# Patient Record
Sex: MALE
[De-identification: names, ages, dates, MRNs, and addresses within clinical notes are randomized per-mention and may not be internally consistent; named-entity substitution may affect disease eponyms.]

---

## 2017-07-06 PROBLEM — Z00.00 ENCOUNTER FOR PREVENTIVE HEALTH EXAMINATION: Status: ACTIVE | Noted: 2017-07-06

## 2017-07-07 ENCOUNTER — APPOINTMENT (OUTPATIENT)
Dept: INTERNAL MEDICINE | Facility: CLINIC | Age: 47
End: 2017-07-07

## 2017-07-31 ENCOUNTER — NON-APPOINTMENT (OUTPATIENT)
Age: 47
End: 2017-07-31

## 2017-07-31 ENCOUNTER — APPOINTMENT (OUTPATIENT)
Dept: CARDIOLOGY | Facility: CLINIC | Age: 47
End: 2017-07-31
Payer: MEDICAID

## 2017-07-31 VITALS
BODY MASS INDEX: 46.59 KG/M2 | DIASTOLIC BLOOD PRESSURE: 101 MMHG | WEIGHT: 283 LBS | HEIGHT: 65.5 IN | SYSTOLIC BLOOD PRESSURE: 167 MMHG | OXYGEN SATURATION: 99 % | HEART RATE: 70 BPM

## 2017-07-31 VITALS — DIASTOLIC BLOOD PRESSURE: 101 MMHG | HEART RATE: 67 BPM | OXYGEN SATURATION: 98 % | SYSTOLIC BLOOD PRESSURE: 167 MMHG

## 2017-07-31 DIAGNOSIS — Z83.3 FAMILY HISTORY OF DIABETES MELLITUS: ICD-10-CM

## 2017-07-31 PROCEDURE — 93000 ELECTROCARDIOGRAM COMPLETE: CPT

## 2017-07-31 PROCEDURE — 99204 OFFICE O/P NEW MOD 45 MIN: CPT | Mod: 25

## 2017-08-17 ENCOUNTER — APPOINTMENT (OUTPATIENT)
Dept: CARDIOLOGY | Facility: CLINIC | Age: 47
End: 2017-08-17
Payer: MEDICAID

## 2017-08-17 PROCEDURE — 93306 TTE W/DOPPLER COMPLETE: CPT

## 2017-09-01 ENCOUNTER — APPOINTMENT (OUTPATIENT)
Dept: BARIATRICS | Facility: CLINIC | Age: 47
End: 2017-09-01
Payer: MEDICAID

## 2017-09-01 VITALS
SYSTOLIC BLOOD PRESSURE: 160 MMHG | DIASTOLIC BLOOD PRESSURE: 90 MMHG | HEIGHT: 63.5 IN | BODY MASS INDEX: 49.69 KG/M2 | WEIGHT: 283.95 LBS

## 2017-09-01 DIAGNOSIS — Z83.49 FAMILY HISTORY OF OTHER ENDOCRINE, NUTRITIONAL AND METABOLIC DISEASES: ICD-10-CM

## 2017-09-01 DIAGNOSIS — Z86.69 PERSONAL HISTORY OF OTHER DISEASES OF THE NERVOUS SYSTEM AND SENSE ORGANS: ICD-10-CM

## 2017-09-01 DIAGNOSIS — Z82.49 FAMILY HISTORY OF ISCHEMIC HEART DISEASE AND OTHER DISEASES OF THE CIRCULATORY SYSTEM: ICD-10-CM

## 2017-09-01 PROCEDURE — 99205 OFFICE O/P NEW HI 60 MIN: CPT

## 2017-09-08 ENCOUNTER — RESULT CHARGE (OUTPATIENT)
Age: 47
End: 2017-09-08

## 2017-09-08 ENCOUNTER — MEDICATION RENEWAL (OUTPATIENT)
Age: 47
End: 2017-09-08

## 2017-09-08 ENCOUNTER — APPOINTMENT (OUTPATIENT)
Dept: CARDIOLOGY | Facility: CLINIC | Age: 47
End: 2017-09-08

## 2017-09-08 ENCOUNTER — FORM ENCOUNTER (OUTPATIENT)
Age: 47
End: 2017-09-08

## 2017-09-08 RX ORDER — VALSARTAN 160 MG/1
160 TABLET, COATED ORAL
Qty: 90 | Refills: 2 | Status: DISCONTINUED | COMMUNITY
Start: 2017-09-08 | End: 2017-09-08

## 2017-09-08 RX ORDER — RAMIPRIL 2.5 MG/1
2.5 CAPSULE ORAL
Qty: 30 | Refills: 0 | Status: DISCONTINUED | COMMUNITY
Start: 2017-09-08 | End: 2017-09-08

## 2017-09-09 ENCOUNTER — APPOINTMENT (OUTPATIENT)
Dept: ULTRASOUND IMAGING | Facility: CLINIC | Age: 47
End: 2017-09-09

## 2017-09-09 ENCOUNTER — TRANSCRIPTION ENCOUNTER (OUTPATIENT)
Age: 47
End: 2017-09-09

## 2017-09-09 ENCOUNTER — OUTPATIENT (OUTPATIENT)
Dept: OUTPATIENT SERVICES | Facility: HOSPITAL | Age: 47
LOS: 1 days | End: 2017-09-09
Payer: COMMERCIAL

## 2017-09-09 DIAGNOSIS — Z00.8 ENCOUNTER FOR OTHER GENERAL EXAMINATION: ICD-10-CM

## 2017-09-09 PROCEDURE — 93970 EXTREMITY STUDY: CPT

## 2017-09-09 PROCEDURE — 76700 US EXAM ABDOM COMPLETE: CPT | Mod: 26

## 2017-09-09 PROCEDURE — 76700 US EXAM ABDOM COMPLETE: CPT

## 2017-09-09 PROCEDURE — 93970 EXTREMITY STUDY: CPT | Mod: 26

## 2017-09-11 ENCOUNTER — APPOINTMENT (OUTPATIENT)
Dept: CARDIOLOGY | Facility: CLINIC | Age: 47
End: 2017-09-11
Payer: MEDICAID

## 2017-09-11 LAB
ALBUMIN SERPL ELPH-MCNC: 4.1 G/DL
ALP BLD-CCNC: 80 U/L
ALT SERPL-CCNC: 25 U/L
ANION GAP SERPL CALC-SCNC: 15 MMOL/L
AST SERPL-CCNC: 21 U/L
BILIRUB SERPL-MCNC: 0.5 MG/DL
BUN SERPL-MCNC: 15 MG/DL
CALCIUM SERPL-MCNC: 9.6 MG/DL
CHLORIDE SERPL-SCNC: 101 MMOL/L
CO2 SERPL-SCNC: 25 MMOL/L
CREAT SERPL-MCNC: 1.01 MG/DL
GLUCOSE SERPL-MCNC: 79 MG/DL
POTASSIUM SERPL-SCNC: 5 MMOL/L
PROT SERPL-MCNC: 7.2 G/DL
SODIUM SERPL-SCNC: 141 MMOL/L

## 2017-09-11 PROCEDURE — 78452 HT MUSCLE IMAGE SPECT MULT: CPT

## 2017-09-11 PROCEDURE — A9500: CPT

## 2017-09-11 PROCEDURE — 93015 CV STRESS TEST SUPVJ I&R: CPT

## 2017-09-13 ENCOUNTER — APPOINTMENT (OUTPATIENT)
Dept: CARDIOLOGY | Facility: CLINIC | Age: 47
End: 2017-09-13
Payer: MEDICAID

## 2017-09-13 VITALS
RESPIRATION RATE: 16 BRPM | WEIGHT: 282 LBS | OXYGEN SATURATION: 97 % | HEIGHT: 63 IN | HEART RATE: 74 BPM | DIASTOLIC BLOOD PRESSURE: 98 MMHG | TEMPERATURE: 98.9 F | SYSTOLIC BLOOD PRESSURE: 160 MMHG | BODY MASS INDEX: 49.96 KG/M2

## 2017-09-13 PROCEDURE — 99215 OFFICE O/P EST HI 40 MIN: CPT

## 2017-09-13 RX ORDER — SITAGLIPTIN 100 MG/1
100 TABLET, FILM COATED ORAL
Qty: 90 | Refills: 1 | Status: DISCONTINUED | COMMUNITY
End: 2017-09-13

## 2017-09-13 RX ORDER — RAMIPRIL 2.5 MG/1
2.5 CAPSULE ORAL DAILY
Qty: 30 | Refills: 0 | Status: DISCONTINUED | COMMUNITY
End: 2017-09-13

## 2017-09-13 RX ORDER — LOSARTAN POTASSIUM 50 MG/1
50 TABLET, FILM COATED ORAL
Qty: 30 | Refills: 3 | Status: DISCONTINUED | COMMUNITY
Start: 2017-09-08 | End: 2017-09-13

## 2017-09-25 ENCOUNTER — APPOINTMENT (OUTPATIENT)
Dept: CARDIOLOGY | Facility: CLINIC | Age: 47
End: 2017-09-25
Payer: MEDICAID

## 2017-09-25 VITALS
OXYGEN SATURATION: 99 % | WEIGHT: 273 LBS | HEIGHT: 63 IN | SYSTOLIC BLOOD PRESSURE: 129 MMHG | BODY MASS INDEX: 48.37 KG/M2 | DIASTOLIC BLOOD PRESSURE: 85 MMHG | HEART RATE: 68 BPM

## 2017-09-25 PROCEDURE — 99215 OFFICE O/P EST HI 40 MIN: CPT

## 2017-10-10 ENCOUNTER — APPOINTMENT (OUTPATIENT)
Dept: PULMONOLOGY | Facility: CLINIC | Age: 47
End: 2017-10-10
Payer: MEDICAID

## 2017-10-10 VITALS
HEIGHT: 65 IN | DIASTOLIC BLOOD PRESSURE: 84 MMHG | OXYGEN SATURATION: 98 % | WEIGHT: 273 LBS | HEART RATE: 70 BPM | BODY MASS INDEX: 45.48 KG/M2 | SYSTOLIC BLOOD PRESSURE: 130 MMHG | RESPIRATION RATE: 17 BRPM

## 2017-10-10 PROCEDURE — 99205 OFFICE O/P NEW HI 60 MIN: CPT | Mod: 25

## 2017-10-10 PROCEDURE — 71020: CPT

## 2017-10-10 PROCEDURE — 94010 BREATHING CAPACITY TEST: CPT | Mod: 59

## 2017-10-10 PROCEDURE — 94620 PULMONARY STRESS TESTING SIMPLE: CPT

## 2017-10-12 ENCOUNTER — MEDICATION RENEWAL (OUTPATIENT)
Age: 47
End: 2017-10-12

## 2017-10-31 ENCOUNTER — APPOINTMENT (OUTPATIENT)
Dept: ORTHOPEDIC SURGERY | Facility: CLINIC | Age: 47
End: 2017-10-31
Payer: MEDICAID

## 2017-10-31 VITALS
HEART RATE: 68 BPM | SYSTOLIC BLOOD PRESSURE: 138 MMHG | HEIGHT: 65 IN | WEIGHT: 273 LBS | BODY MASS INDEX: 45.48 KG/M2 | DIASTOLIC BLOOD PRESSURE: 81 MMHG

## 2017-10-31 PROCEDURE — 73562 X-RAY EXAM OF KNEE 3: CPT | Mod: RT

## 2017-10-31 PROCEDURE — 99213 OFFICE O/P EST LOW 20 MIN: CPT

## 2017-11-01 ENCOUNTER — MEDICATION RENEWAL (OUTPATIENT)
Age: 47
End: 2017-11-01

## 2017-11-01 RX ORDER — OLOPATADINE HYDROCHLORIDE 665 UG/1
0.6 SPRAY, METERED NASAL
Qty: 3 | Refills: 1 | Status: DISCONTINUED | COMMUNITY
Start: 2017-10-10 | End: 2017-11-01

## 2017-11-02 ENCOUNTER — APPOINTMENT (OUTPATIENT)
Dept: BARIATRICS/WEIGHT MGMT | Facility: CLINIC | Age: 47
End: 2017-11-02
Payer: COMMERCIAL

## 2017-11-02 VITALS — WEIGHT: 270.4 LBS | BODY MASS INDEX: 45 KG/M2

## 2017-11-02 DIAGNOSIS — Z78.9 OTHER SPECIFIED HEALTH STATUS: ICD-10-CM

## 2017-11-02 DIAGNOSIS — F17.290 NICOTINE DEPENDENCE, OTHER TOBACCO PRODUCT, UNCOMPLICATED: ICD-10-CM

## 2017-11-02 PROCEDURE — 90791 PSYCH DIAGNOSTIC EVALUATION: CPT

## 2017-11-03 ENCOUNTER — APPOINTMENT (OUTPATIENT)
Dept: GASTROENTEROLOGY | Facility: CLINIC | Age: 47
End: 2017-11-03
Payer: MEDICAID

## 2017-11-03 VITALS
BODY MASS INDEX: 44.65 KG/M2 | TEMPERATURE: 98.2 F | OXYGEN SATURATION: 97 % | HEIGHT: 65 IN | WEIGHT: 268 LBS | HEART RATE: 83 BPM | SYSTOLIC BLOOD PRESSURE: 140 MMHG | DIASTOLIC BLOOD PRESSURE: 90 MMHG | RESPIRATION RATE: 14 BRPM

## 2017-11-03 PROCEDURE — 99204 OFFICE O/P NEW MOD 45 MIN: CPT

## 2017-11-03 RX ORDER — BETAMETHASONE DIPROPIONATE 0.5 MG/G
0.05 OINTMENT, AUGMENTED TOPICAL
Qty: 50 | Refills: 0 | Status: DISCONTINUED | COMMUNITY
Start: 2017-07-24 | End: 2017-11-03

## 2017-11-07 ENCOUNTER — APPOINTMENT (OUTPATIENT)
Dept: BARIATRICS/WEIGHT MGMT | Facility: CLINIC | Age: 47
End: 2017-11-07
Payer: SELF-PAY

## 2017-11-07 VITALS — WEIGHT: 270 LBS | BODY MASS INDEX: 44.98 KG/M2 | HEIGHT: 65 IN

## 2017-11-07 PROCEDURE — 97802 MEDICAL NUTRITION INDIV IN: CPT

## 2017-11-17 ENCOUNTER — APPOINTMENT (OUTPATIENT)
Dept: ORTHOPEDIC SURGERY | Facility: CLINIC | Age: 47
End: 2017-11-17
Payer: MEDICAID

## 2017-11-17 VITALS
DIASTOLIC BLOOD PRESSURE: 82 MMHG | HEART RATE: 67 BPM | BODY MASS INDEX: 44.98 KG/M2 | SYSTOLIC BLOOD PRESSURE: 139 MMHG | HEIGHT: 65 IN | WEIGHT: 270 LBS

## 2017-11-17 PROCEDURE — 20610 DRAIN/INJ JOINT/BURSA W/O US: CPT | Mod: RT

## 2017-11-17 PROCEDURE — 99214 OFFICE O/P EST MOD 30 MIN: CPT | Mod: 25

## 2017-11-17 RX ORDER — HYALURONATE SOD, CROSS-LINKED 30 MG/3 ML
30 SYRINGE (ML) INTRAARTICULAR
Qty: 1 | Refills: 0 | Status: DISCONTINUED | OUTPATIENT
Start: 2017-10-31 | End: 2017-11-03

## 2017-11-21 ENCOUNTER — APPOINTMENT (OUTPATIENT)
Dept: ORTHOPEDIC SURGERY | Facility: CLINIC | Age: 47
End: 2017-11-21
Payer: MEDICAID

## 2017-11-21 VITALS
DIASTOLIC BLOOD PRESSURE: 89 MMHG | HEART RATE: 73 BPM | SYSTOLIC BLOOD PRESSURE: 150 MMHG | BODY MASS INDEX: 44.98 KG/M2 | HEIGHT: 65 IN | WEIGHT: 270 LBS

## 2017-11-21 PROCEDURE — 20610 DRAIN/INJ JOINT/BURSA W/O US: CPT | Mod: RT

## 2017-11-21 RX ADMIN — Medication 0 MG/3ML: at 00:00

## 2017-11-28 ENCOUNTER — OUTPATIENT (OUTPATIENT)
Dept: OUTPATIENT SERVICES | Facility: HOSPITAL | Age: 47
LOS: 1 days | End: 2017-11-28
Payer: MEDICAID

## 2017-11-28 ENCOUNTER — TRANSCRIPTION ENCOUNTER (OUTPATIENT)
Age: 47
End: 2017-11-28

## 2017-11-28 ENCOUNTER — RESULT REVIEW (OUTPATIENT)
Age: 47
End: 2017-11-28

## 2017-11-28 ENCOUNTER — APPOINTMENT (OUTPATIENT)
Dept: GASTROENTEROLOGY | Facility: HOSPITAL | Age: 47
End: 2017-11-28

## 2017-11-28 DIAGNOSIS — Z01.818 ENCOUNTER FOR OTHER PREPROCEDURAL EXAMINATION: ICD-10-CM

## 2017-11-28 PROCEDURE — 88313 SPECIAL STAINS GROUP 2: CPT | Mod: 26

## 2017-11-28 PROCEDURE — 43239 EGD BIOPSY SINGLE/MULTIPLE: CPT

## 2017-11-28 PROCEDURE — 88305 TISSUE EXAM BY PATHOLOGIST: CPT

## 2017-11-28 PROCEDURE — 88312 SPECIAL STAINS GROUP 1: CPT | Mod: 26

## 2017-11-28 PROCEDURE — 88313 SPECIAL STAINS GROUP 2: CPT

## 2017-11-28 PROCEDURE — 88312 SPECIAL STAINS GROUP 1: CPT

## 2017-11-28 PROCEDURE — 88305 TISSUE EXAM BY PATHOLOGIST: CPT | Mod: 26

## 2017-11-30 ENCOUNTER — APPOINTMENT (OUTPATIENT)
Dept: PULMONOLOGY | Facility: CLINIC | Age: 47
End: 2017-11-30
Payer: MEDICAID

## 2017-11-30 VITALS
OXYGEN SATURATION: 97 % | WEIGHT: 269 LBS | RESPIRATION RATE: 17 BRPM | HEIGHT: 65 IN | HEART RATE: 61 BPM | BODY MASS INDEX: 44.82 KG/M2 | DIASTOLIC BLOOD PRESSURE: 80 MMHG | SYSTOLIC BLOOD PRESSURE: 128 MMHG

## 2017-11-30 PROCEDURE — 94010 BREATHING CAPACITY TEST: CPT

## 2017-11-30 PROCEDURE — 99214 OFFICE O/P EST MOD 30 MIN: CPT | Mod: 25

## 2017-11-30 RX ORDER — HYALURONATE SOD, CROSS-LINKED 30 MG/3 ML
30 SYRINGE (ML) INTRAARTICULAR
Refills: 0 | Status: COMPLETED | OUTPATIENT
Start: 2017-11-21

## 2017-12-01 ENCOUNTER — OUTPATIENT (OUTPATIENT)
Dept: OUTPATIENT SERVICES | Facility: HOSPITAL | Age: 47
LOS: 1 days | End: 2017-12-01
Payer: MEDICAID

## 2017-12-01 ENCOUNTER — APPOINTMENT (OUTPATIENT)
Dept: BARIATRICS/WEIGHT MGMT | Facility: CLINIC | Age: 47
End: 2017-12-01
Payer: SELF-PAY

## 2017-12-01 ENCOUNTER — APPOINTMENT (OUTPATIENT)
Dept: BARIATRICS | Facility: CLINIC | Age: 47
End: 2017-12-01
Payer: MEDICAID

## 2017-12-01 VITALS — HEIGHT: 65 IN | WEIGHT: 270 LBS | BODY MASS INDEX: 44.98 KG/M2

## 2017-12-01 VITALS
SYSTOLIC BLOOD PRESSURE: 134 MMHG | HEART RATE: 68 BPM | WEIGHT: 270 LBS | HEIGHT: 63.5 IN | DIASTOLIC BLOOD PRESSURE: 92 MMHG | OXYGEN SATURATION: 98 % | BODY MASS INDEX: 47.25 KG/M2

## 2017-12-01 LAB
24R-OH-CALCIDIOL SERPL-MCNC: 19.9 NG/ML — LOW (ref 30–80)
ALBUMIN SERPL ELPH-MCNC: 3.7 G/DL — SIGNIFICANT CHANGE UP (ref 3.3–5)
ALP SERPL-CCNC: 87 U/L — SIGNIFICANT CHANGE UP (ref 30–120)
ALT FLD-CCNC: 33 U/L DA — SIGNIFICANT CHANGE UP (ref 10–60)
ANION GAP SERPL CALC-SCNC: 10 MMOL/L — SIGNIFICANT CHANGE UP (ref 5–17)
APTT BLD: 31.6 SEC — SIGNIFICANT CHANGE UP (ref 27.5–37.4)
AST SERPL-CCNC: 17 U/L — SIGNIFICANT CHANGE UP (ref 10–40)
BASOPHILS # BLD AUTO: 0.1 K/UL — SIGNIFICANT CHANGE UP (ref 0–0.2)
BASOPHILS NFR BLD AUTO: 0.8 % — SIGNIFICANT CHANGE UP (ref 0–2)
BILIRUB SERPL-MCNC: 0.5 MG/DL — SIGNIFICANT CHANGE UP (ref 0.2–1.2)
BUN SERPL-MCNC: 19 MG/DL — SIGNIFICANT CHANGE UP (ref 7–23)
CALCIUM SERPL-MCNC: 9.3 MG/DL — SIGNIFICANT CHANGE UP (ref 8.4–10.5)
CHLORIDE SERPL-SCNC: 102 MMOL/L — SIGNIFICANT CHANGE UP (ref 96–108)
CHOLEST SERPL-MCNC: 133 MG/DL — SIGNIFICANT CHANGE UP (ref 10–199)
CO2 SERPL-SCNC: 28 MMOL/L — SIGNIFICANT CHANGE UP (ref 22–31)
CREAT SERPL-MCNC: 1.06 MG/DL — SIGNIFICANT CHANGE UP (ref 0.5–1.3)
EOSINOPHIL # BLD AUTO: 0.3 K/UL — SIGNIFICANT CHANGE UP (ref 0–0.5)
EOSINOPHIL NFR BLD AUTO: 3.5 % — SIGNIFICANT CHANGE UP (ref 0–6)
FOLATE SERPL-MCNC: 15.4 NG/ML — SIGNIFICANT CHANGE UP (ref 4.8–24.2)
GLUCOSE SERPL-MCNC: 141 MG/DL — HIGH (ref 70–99)
HBA1C BLD-MCNC: 6.9 % — HIGH (ref 4–5.6)
HCT VFR BLD CALC: 42.9 % — SIGNIFICANT CHANGE UP (ref 39–50)
HDLC SERPL-MCNC: 37 MG/DL — LOW (ref 40–125)
HGB BLD-MCNC: 13.7 G/DL — SIGNIFICANT CHANGE UP (ref 13–17)
INR BLD: 0.98 RATIO — SIGNIFICANT CHANGE UP (ref 0.88–1.16)
IRON SATN MFR SERPL: 26 % — SIGNIFICANT CHANGE UP (ref 16–55)
IRON SATN MFR SERPL: 72 UG/DL — SIGNIFICANT CHANGE UP (ref 45–165)
LIPID PNL WITH DIRECT LDL SERPL: 70 MG/DL — SIGNIFICANT CHANGE UP
LYMPHOCYTES # BLD AUTO: 2.1 K/UL — SIGNIFICANT CHANGE UP (ref 1–3.3)
LYMPHOCYTES # BLD AUTO: 21.5 % — SIGNIFICANT CHANGE UP (ref 13–44)
MCHC RBC-ENTMCNC: 25.6 PG — LOW (ref 27–34)
MCHC RBC-ENTMCNC: 31.9 GM/DL — LOW (ref 32–36)
MCV RBC AUTO: 80.3 FL — SIGNIFICANT CHANGE UP (ref 80–100)
MONOCYTES # BLD AUTO: 0.5 K/UL — SIGNIFICANT CHANGE UP (ref 0–0.9)
MONOCYTES NFR BLD AUTO: 5.2 % — SIGNIFICANT CHANGE UP (ref 2–14)
NEUTROPHILS # BLD AUTO: 6.8 K/UL — SIGNIFICANT CHANGE UP (ref 1.8–7.4)
NEUTROPHILS NFR BLD AUTO: 69 % — SIGNIFICANT CHANGE UP (ref 43–77)
PLATELET # BLD AUTO: 238 K/UL — SIGNIFICANT CHANGE UP (ref 150–400)
POTASSIUM SERPL-MCNC: 4.1 MMOL/L — SIGNIFICANT CHANGE UP (ref 3.5–5.3)
POTASSIUM SERPL-SCNC: 4.1 MMOL/L — SIGNIFICANT CHANGE UP (ref 3.5–5.3)
PROT SERPL-MCNC: 7.7 G/DL — SIGNIFICANT CHANGE UP (ref 6–8.3)
PROTHROM AB SERPL-ACNC: 10.7 SEC — SIGNIFICANT CHANGE UP (ref 9.8–12.7)
RBC # BLD: 5.35 M/UL — SIGNIFICANT CHANGE UP (ref 4.2–5.8)
RBC # FLD: 13.2 % — SIGNIFICANT CHANGE UP (ref 10.3–14.5)
SODIUM SERPL-SCNC: 140 MMOL/L — SIGNIFICANT CHANGE UP (ref 135–145)
T3 SERPL-MCNC: 109 NG/DL — SIGNIFICANT CHANGE UP (ref 80–200)
T4 AB SER-ACNC: 5.5 UG/DL — SIGNIFICANT CHANGE UP (ref 4.6–12)
T4 FREE SERPL-MCNC: 0.8 NG/DL — LOW (ref 0.9–1.8)
TIBC SERPL-MCNC: 274 UG/DL — SIGNIFICANT CHANGE UP (ref 220–430)
TOTAL CHOLESTEROL/HDL RATIO MEASUREMENT: 3.6 RATIO — SIGNIFICANT CHANGE UP (ref 3.4–9.6)
TRIGL SERPL-MCNC: 132 MG/DL — SIGNIFICANT CHANGE UP (ref 10–149)
TSH SERPL-MCNC: 1.76 UIU/ML — SIGNIFICANT CHANGE UP (ref 0.27–4.2)
UIBC SERPL-MCNC: 202 UG/DL — SIGNIFICANT CHANGE UP (ref 110–370)
VIT B12 SERPL-MCNC: 757 PG/ML — SIGNIFICANT CHANGE UP (ref 243–894)
VIT D25+D1,25 OH+D1,25 PNL SERPL-MCNC: 28.1 PG/ML — SIGNIFICANT CHANGE UP (ref 19.9–79.3)
WBC # BLD: 9.9 K/UL — SIGNIFICANT CHANGE UP (ref 3.8–10.5)
WBC # FLD AUTO: 9.9 K/UL — SIGNIFICANT CHANGE UP (ref 3.8–10.5)

## 2017-12-01 PROCEDURE — 84207 ASSAY OF VITAMIN B-6: CPT

## 2017-12-01 PROCEDURE — 84443 ASSAY THYROID STIM HORMONE: CPT

## 2017-12-01 PROCEDURE — 99214 OFFICE O/P EST MOD 30 MIN: CPT

## 2017-12-01 PROCEDURE — 84590 ASSAY OF VITAMIN A: CPT

## 2017-12-01 PROCEDURE — 84425 ASSAY OF VITAMIN B-1: CPT

## 2017-12-01 PROCEDURE — 83036 HEMOGLOBIN GLYCOSYLATED A1C: CPT

## 2017-12-01 PROCEDURE — 97803 MED NUTRITION INDIV SUBSEQ: CPT

## 2017-12-01 PROCEDURE — 84480 ASSAY TRIIODOTHYRONINE (T3): CPT

## 2017-12-01 PROCEDURE — 85027 COMPLETE CBC AUTOMATED: CPT

## 2017-12-01 PROCEDURE — 36415 COLL VENOUS BLD VENIPUNCTURE: CPT

## 2017-12-01 PROCEDURE — 85730 THROMBOPLASTIN TIME PARTIAL: CPT

## 2017-12-01 PROCEDURE — 85610 PROTHROMBIN TIME: CPT

## 2017-12-01 PROCEDURE — 82608 B-12 BINDING CAPACITY: CPT

## 2017-12-01 PROCEDURE — 82746 ASSAY OF FOLIC ACID SERUM: CPT

## 2017-12-01 PROCEDURE — 83550 IRON BINDING TEST: CPT

## 2017-12-01 PROCEDURE — 84436 ASSAY OF TOTAL THYROXINE: CPT

## 2017-12-01 PROCEDURE — 80053 COMPREHEN METABOLIC PANEL: CPT

## 2017-12-01 PROCEDURE — 82652 VIT D 1 25-DIHYDROXY: CPT

## 2017-12-01 PROCEDURE — 80061 LIPID PANEL: CPT

## 2017-12-01 PROCEDURE — 82607 VITAMIN B-12: CPT

## 2017-12-01 PROCEDURE — 84439 ASSAY OF FREE THYROXINE: CPT

## 2017-12-01 PROCEDURE — 82306 VITAMIN D 25 HYDROXY: CPT

## 2017-12-05 LAB — VIT A SERPL-MCNC: 49 UG/DL — SIGNIFICANT CHANGE UP (ref 24–85)

## 2017-12-06 LAB
PYRIDOXAL PHOS SERPL-MCNC: 9.1 UG/L — SIGNIFICANT CHANGE UP (ref 5.3–46.7)
VIT B12 USB SERPL-MCNC: 859 PG/ML — SIGNIFICANT CHANGE UP (ref 650–1340)

## 2017-12-07 LAB — VIT B1 SERPL-MCNC: 130.4 NMOL/L — SIGNIFICANT CHANGE UP (ref 66.5–200)

## 2017-12-09 ENCOUNTER — OUTPATIENT (OUTPATIENT)
Dept: OUTPATIENT SERVICES | Facility: HOSPITAL | Age: 47
LOS: 1 days | End: 2017-12-09
Payer: MEDICAID

## 2017-12-09 ENCOUNTER — APPOINTMENT (OUTPATIENT)
Dept: SLEEP CENTER | Facility: CLINIC | Age: 47
End: 2017-12-09
Payer: MEDICAID

## 2017-12-09 PROCEDURE — 95811 POLYSOM 6/>YRS CPAP 4/> PARM: CPT

## 2017-12-09 PROCEDURE — 95811 POLYSOM 6/>YRS CPAP 4/> PARM: CPT | Mod: 26

## 2017-12-11 DIAGNOSIS — G47.33 OBSTRUCTIVE SLEEP APNEA (ADULT) (PEDIATRIC): ICD-10-CM

## 2017-12-19 ENCOUNTER — RESULT REVIEW (OUTPATIENT)
Age: 47
End: 2017-12-19

## 2017-12-22 ENCOUNTER — CHART COPY (OUTPATIENT)
Age: 47
End: 2017-12-22

## 2017-12-27 ENCOUNTER — APPOINTMENT (OUTPATIENT)
Dept: BARIATRICS/WEIGHT MGMT | Facility: CLINIC | Age: 47
End: 2017-12-27
Payer: SELF-PAY

## 2017-12-27 VITALS — BODY MASS INDEX: 48.12 KG/M2 | WEIGHT: 275 LBS | HEIGHT: 63.5 IN

## 2017-12-27 PROCEDURE — 97803 MED NUTRITION INDIV SUBSEQ: CPT

## 2017-12-29 ENCOUNTER — APPOINTMENT (OUTPATIENT)
Dept: ORTHOPEDIC SURGERY | Facility: CLINIC | Age: 47
End: 2017-12-29
Payer: MEDICAID

## 2017-12-29 VITALS
BODY MASS INDEX: 47.12 KG/M2 | HEIGHT: 64 IN | SYSTOLIC BLOOD PRESSURE: 143 MMHG | HEART RATE: 74 BPM | WEIGHT: 276 LBS | DIASTOLIC BLOOD PRESSURE: 84 MMHG

## 2017-12-29 PROCEDURE — 99214 OFFICE O/P EST MOD 30 MIN: CPT | Mod: 25

## 2017-12-29 PROCEDURE — 20610 DRAIN/INJ JOINT/BURSA W/O US: CPT | Mod: LT

## 2017-12-29 PROCEDURE — 72170 X-RAY EXAM OF PELVIS: CPT | Mod: 59

## 2017-12-29 PROCEDURE — 72110 X-RAY EXAM L-2 SPINE 4/>VWS: CPT

## 2017-12-29 RX ADMIN — LIDOCAINE HYDROCHLORIDE 6 %: 10 INJECTION, SOLUTION EPIDURAL; INFILTRATION; INTRACAUDAL; PERINEURAL at 00:00

## 2017-12-29 RX ADMIN — Medication 1 MG/ML: at 00:00

## 2018-01-12 RX ORDER — METHYLPRED ACET/NACL,ISO-OS/PF 40 MG/ML
40 VIAL (ML) INJECTION
Qty: 1 | Refills: 0 | Status: COMPLETED | OUTPATIENT
Start: 2017-12-29

## 2018-01-12 RX ORDER — LIDOCAINE HYDROCHLORIDE 10 MG/ML
1 INJECTION, SOLUTION INFILTRATION; PERINEURAL
Refills: 0 | Status: COMPLETED | OUTPATIENT
Start: 2017-12-29

## 2018-01-19 ENCOUNTER — APPOINTMENT (OUTPATIENT)
Dept: GASTROENTEROLOGY | Facility: CLINIC | Age: 48
End: 2018-01-19
Payer: MEDICAID

## 2018-01-19 DIAGNOSIS — K76.0 FATTY (CHANGE OF) LIVER, NOT ELSEWHERE CLASSIFIED: ICD-10-CM

## 2018-01-19 PROCEDURE — 99213 OFFICE O/P EST LOW 20 MIN: CPT

## 2018-01-19 RX ORDER — ATORVASTATIN CALCIUM 10 MG/1
10 TABLET, FILM COATED ORAL
Qty: 30 | Refills: 0 | Status: COMPLETED | COMMUNITY
Start: 2017-10-03 | End: 2018-01-19

## 2018-01-19 RX ORDER — AMOXICILLIN 500 MG/1
500 CAPSULE ORAL
Qty: 56 | Refills: 0 | Status: COMPLETED | COMMUNITY
Start: 2017-12-06 | End: 2018-01-19

## 2018-01-19 RX ORDER — RANITIDINE 150 MG/1
150 TABLET ORAL
Qty: 1 | Refills: 1 | Status: COMPLETED | COMMUNITY
Start: 2017-10-10 | End: 2018-01-19

## 2018-01-19 RX ORDER — OLMESARTAN MEDOXOMIL-HYDROCHLOROTHIAZIDE 25; 40 MG/1; MG/1
40-25 TABLET, FILM COATED ORAL DAILY
Refills: 0 | Status: COMPLETED | COMMUNITY
End: 2018-01-19

## 2018-01-19 RX ORDER — MELOXICAM 15 MG/1
15 TABLET ORAL DAILY
Qty: 30 | Refills: 0 | Status: COMPLETED | COMMUNITY
Start: 2017-11-17 | End: 2018-01-19

## 2018-01-19 RX ORDER — LIDOCAINE 5% 700 MG/1
5 PATCH TOPICAL
Qty: 1 | Refills: 2 | Status: COMPLETED | COMMUNITY
Start: 2017-11-17 | End: 2018-01-19

## 2018-01-19 RX ORDER — CLARITHROMYCIN 500 MG/1
500 TABLET, FILM COATED ORAL TWICE DAILY
Qty: 28 | Refills: 0 | Status: COMPLETED | COMMUNITY
Start: 2017-12-06 | End: 2018-01-19

## 2018-01-19 RX ORDER — DICLOFENAC SODIUM 10 MG/G
1 GEL TOPICAL TWICE DAILY
Qty: 1 | Refills: 2 | Status: COMPLETED | COMMUNITY
Start: 2017-12-29 | End: 2018-01-19

## 2018-01-19 RX ORDER — AMOXICILLIN 500 MG/1
500 TABLET, FILM COATED ORAL TWICE DAILY
Qty: 56 | Refills: 0 | Status: COMPLETED | COMMUNITY
Start: 2017-12-06 | End: 2018-01-19

## 2018-01-19 RX ORDER — METFORMIN HYDROCHLORIDE 1000 MG/1
1000 TABLET, COATED ORAL
Qty: 180 | Refills: 3 | Status: COMPLETED | COMMUNITY
End: 2018-01-19

## 2018-01-20 LAB — UREA BREATH TEST QL: NEGATIVE

## 2018-01-24 ENCOUNTER — APPOINTMENT (OUTPATIENT)
Dept: GASTROENTEROLOGY | Facility: CLINIC | Age: 48
End: 2018-01-24

## 2018-01-31 ENCOUNTER — APPOINTMENT (OUTPATIENT)
Dept: PULMONOLOGY | Facility: CLINIC | Age: 48
End: 2018-01-31
Payer: MEDICAID

## 2018-01-31 PROCEDURE — 95070 INHLJ BRNCL CHALLENGE TSTG: CPT

## 2018-01-31 PROCEDURE — 94070 EVALUATION OF WHEEZING: CPT

## 2018-02-06 ENCOUNTER — APPOINTMENT (OUTPATIENT)
Dept: PULMONOLOGY | Facility: CLINIC | Age: 48
End: 2018-02-06
Payer: MEDICAID

## 2018-02-06 VITALS
SYSTOLIC BLOOD PRESSURE: 110 MMHG | HEIGHT: 64 IN | DIASTOLIC BLOOD PRESSURE: 70 MMHG | WEIGHT: 275 LBS | BODY MASS INDEX: 46.95 KG/M2 | OXYGEN SATURATION: 98 % | HEART RATE: 75 BPM

## 2018-02-06 PROCEDURE — 94618 PULMONARY STRESS TESTING: CPT

## 2018-02-06 PROCEDURE — 94729 DIFFUSING CAPACITY: CPT

## 2018-02-06 PROCEDURE — 99214 OFFICE O/P EST MOD 30 MIN: CPT | Mod: 25

## 2018-02-06 PROCEDURE — 94010 BREATHING CAPACITY TEST: CPT | Mod: 59

## 2018-02-06 PROCEDURE — 94727 GAS DIL/WSHOT DETER LNG VOL: CPT

## 2018-02-07 ENCOUNTER — RESULT REVIEW (OUTPATIENT)
Age: 48
End: 2018-02-07

## 2018-02-07 DIAGNOSIS — N28.1 CYST OF KIDNEY, ACQUIRED: ICD-10-CM

## 2018-02-07 DIAGNOSIS — R16.0 HEPATOMEGALY, NOT ELSEWHERE CLASSIFIED: ICD-10-CM

## 2018-02-14 ENCOUNTER — OUTPATIENT (OUTPATIENT)
Dept: OUTPATIENT SERVICES | Facility: HOSPITAL | Age: 48
LOS: 1 days | End: 2018-02-14
Payer: MEDICAID

## 2018-02-14 ENCOUNTER — APPOINTMENT (OUTPATIENT)
Dept: MRI IMAGING | Facility: IMAGING CENTER | Age: 48
End: 2018-02-14
Payer: MEDICAID

## 2018-02-14 DIAGNOSIS — Z00.8 ENCOUNTER FOR OTHER GENERAL EXAMINATION: ICD-10-CM

## 2018-02-14 PROCEDURE — 82565 ASSAY OF CREATININE: CPT

## 2018-02-14 PROCEDURE — A9585: CPT

## 2018-02-14 PROCEDURE — 70553 MRI BRAIN STEM W/O & W/DYE: CPT | Mod: 26

## 2018-02-14 PROCEDURE — 70553 MRI BRAIN STEM W/O & W/DYE: CPT

## 2018-02-21 ENCOUNTER — APPOINTMENT (OUTPATIENT)
Dept: BARIATRICS | Facility: CLINIC | Age: 48
End: 2018-02-21
Payer: MEDICAID

## 2018-02-21 VITALS
HEIGHT: 64 IN | WEIGHT: 270.5 LBS | SYSTOLIC BLOOD PRESSURE: 130 MMHG | HEART RATE: 73 BPM | DIASTOLIC BLOOD PRESSURE: 90 MMHG | OXYGEN SATURATION: 99 % | BODY MASS INDEX: 46.18 KG/M2

## 2018-02-21 PROCEDURE — 99214 OFFICE O/P EST MOD 30 MIN: CPT

## 2018-02-21 RX ORDER — OMEPRAZOLE 40 MG/1
40 CAPSULE, DELAYED RELEASE ORAL
Qty: 30 | Refills: 3 | Status: COMPLETED | COMMUNITY
Start: 2017-11-29 | End: 2018-02-21

## 2018-02-21 RX ORDER — MELOXICAM 15 MG/1
15 TABLET ORAL DAILY
Qty: 30 | Refills: 0 | Status: COMPLETED | COMMUNITY
Start: 2017-12-22 | End: 2018-02-21

## 2018-02-26 ENCOUNTER — OUTPATIENT (OUTPATIENT)
Dept: OUTPATIENT SERVICES | Facility: HOSPITAL | Age: 48
LOS: 1 days | Discharge: HOME | End: 2018-02-26
Payer: OTHER MISCELLANEOUS

## 2018-02-26 VITALS
RESPIRATION RATE: 15 BRPM | DIASTOLIC BLOOD PRESSURE: 80 MMHG | OXYGEN SATURATION: 98 % | WEIGHT: 274.92 LBS | TEMPERATURE: 98 F | HEART RATE: 75 BPM | SYSTOLIC BLOOD PRESSURE: 128 MMHG | HEIGHT: 65 IN

## 2018-02-26 DIAGNOSIS — Z96.652 PRESENCE OF LEFT ARTIFICIAL KNEE JOINT: Chronic | ICD-10-CM

## 2018-02-26 DIAGNOSIS — M77.11 LATERAL EPICONDYLITIS, RIGHT ELBOW: ICD-10-CM

## 2018-02-26 LAB
ANION GAP SERPL CALC-SCNC: 8 MMOL/L — SIGNIFICANT CHANGE UP (ref 5–17)
BASOPHILS # BLD AUTO: 0.1 K/UL — SIGNIFICANT CHANGE UP (ref 0–0.2)
BASOPHILS NFR BLD AUTO: 0.7 % — SIGNIFICANT CHANGE UP (ref 0–2)
BUN SERPL-MCNC: 16 MG/DL — SIGNIFICANT CHANGE UP (ref 7–23)
CALCIUM SERPL-MCNC: 9 MG/DL — SIGNIFICANT CHANGE UP (ref 8.5–10.1)
CHLORIDE SERPL-SCNC: 104 MMOL/L — SIGNIFICANT CHANGE UP (ref 96–108)
CO2 SERPL-SCNC: 27 MMOL/L — SIGNIFICANT CHANGE UP (ref 22–31)
CREAT SERPL-MCNC: 1.03 MG/DL — SIGNIFICANT CHANGE UP (ref 0.5–1.3)
EOSINOPHIL # BLD AUTO: 0.3 K/UL — SIGNIFICANT CHANGE UP (ref 0–0.5)
EOSINOPHIL NFR BLD AUTO: 2.6 % — SIGNIFICANT CHANGE UP (ref 0–6)
GLUCOSE SERPL-MCNC: 168 MG/DL — HIGH (ref 70–99)
HCT VFR BLD CALC: 42.1 % — SIGNIFICANT CHANGE UP (ref 39–50)
HGB BLD-MCNC: 13.9 G/DL — SIGNIFICANT CHANGE UP (ref 13–17)
LYMPHOCYTES # BLD AUTO: 2.8 K/UL — SIGNIFICANT CHANGE UP (ref 1–3.3)
LYMPHOCYTES # BLD AUTO: 24.6 % — SIGNIFICANT CHANGE UP (ref 13–44)
MCHC RBC-ENTMCNC: 26 PG — LOW (ref 27–34)
MCHC RBC-ENTMCNC: 32.9 GM/DL — SIGNIFICANT CHANGE UP (ref 32–36)
MCV RBC AUTO: 79 FL — LOW (ref 80–100)
MONOCYTES # BLD AUTO: 0.5 K/UL — SIGNIFICANT CHANGE UP (ref 0–0.9)
MONOCYTES NFR BLD AUTO: 4.8 % — SIGNIFICANT CHANGE UP (ref 2–14)
NEUTROPHILS # BLD AUTO: 7.7 K/UL — HIGH (ref 1.8–7.4)
NEUTROPHILS NFR BLD AUTO: 67.3 % — SIGNIFICANT CHANGE UP (ref 43–77)
PLATELET # BLD AUTO: 258 K/UL — SIGNIFICANT CHANGE UP (ref 150–400)
POTASSIUM SERPL-MCNC: 4.2 MMOL/L — SIGNIFICANT CHANGE UP (ref 3.5–5.3)
POTASSIUM SERPL-SCNC: 4.2 MMOL/L — SIGNIFICANT CHANGE UP (ref 3.5–5.3)
RBC # BLD: 5.33 M/UL — SIGNIFICANT CHANGE UP (ref 4.2–5.8)
RBC # FLD: 13.3 % — SIGNIFICANT CHANGE UP (ref 10.3–14.5)
SODIUM SERPL-SCNC: 139 MMOL/L — SIGNIFICANT CHANGE UP (ref 135–145)
WBC # BLD: 11.4 K/UL — HIGH (ref 3.8–10.5)
WBC # FLD AUTO: 11.4 K/UL — HIGH (ref 3.8–10.5)

## 2018-02-26 PROCEDURE — 93010 ELECTROCARDIOGRAM REPORT: CPT

## 2018-02-26 PROCEDURE — 71046 X-RAY EXAM CHEST 2 VIEWS: CPT | Mod: 26

## 2018-02-26 NOTE — H&P PST ADULT - ASSESSMENT
46 yo male scheduled for repair of lateral epicondyle right elbow with Dr. Ma on 3/1/18     1. Labs as per surgeon  2. EKG  3. Medical clearance with PCP Dr Hernandez  4. discussed EZ sponges & day of procedure instructions  5. CXR

## 2018-02-26 NOTE — H&P PST ADULT - MUSCULOSKELETAL COMMENTS
Reports right knee pain & OA. Reports right elbow pain s/p injury at work 2017. right lateral epicondyle tender to palpation, no crepitus

## 2018-02-26 NOTE — H&P PST ADULT - PMH
Carpal tunnel syndrome on left    Elbow pain, right    Essential hypertension    Ligament tear  right elbow  Morbid obesity    Other secondary osteoarthritis of right knee    Sleep apnea  waiting for CPAP  Type 2 diabetes mellitus

## 2018-02-28 RX ORDER — OXYCODONE HYDROCHLORIDE 5 MG/1
10 TABLET ORAL EVERY 6 HOURS
Qty: 0 | Refills: 0 | Status: DISCONTINUED | OUTPATIENT
Start: 2018-03-01 | End: 2018-03-01

## 2018-02-28 RX ORDER — FENTANYL CITRATE 50 UG/ML
50 INJECTION INTRAVENOUS
Qty: 0 | Refills: 0 | Status: DISCONTINUED | OUTPATIENT
Start: 2018-03-01 | End: 2018-03-01

## 2018-03-01 ENCOUNTER — RESULT REVIEW (OUTPATIENT)
Age: 48
End: 2018-03-01

## 2018-03-01 ENCOUNTER — OUTPATIENT (OUTPATIENT)
Dept: OUTPATIENT SERVICES | Facility: HOSPITAL | Age: 48
LOS: 1 days | Discharge: HOME | End: 2018-03-01
Payer: OTHER MISCELLANEOUS

## 2018-03-01 VITALS
RESPIRATION RATE: 16 BRPM | DIASTOLIC BLOOD PRESSURE: 87 MMHG | WEIGHT: 274.92 LBS | OXYGEN SATURATION: 98 % | HEIGHT: 63 IN | HEART RATE: 58 BPM | TEMPERATURE: 98 F | SYSTOLIC BLOOD PRESSURE: 128 MMHG

## 2018-03-01 VITALS
SYSTOLIC BLOOD PRESSURE: 141 MMHG | DIASTOLIC BLOOD PRESSURE: 75 MMHG | RESPIRATION RATE: 16 BRPM | OXYGEN SATURATION: 98 % | HEART RATE: 66 BPM

## 2018-03-01 DIAGNOSIS — Z96.652 PRESENCE OF LEFT ARTIFICIAL KNEE JOINT: Chronic | ICD-10-CM

## 2018-03-01 PROCEDURE — 88304 TISSUE EXAM BY PATHOLOGIST: CPT | Mod: 26

## 2018-03-01 RX ORDER — ONDANSETRON 8 MG/1
4 TABLET, FILM COATED ORAL ONCE
Qty: 0 | Refills: 0 | Status: DISCONTINUED | OUTPATIENT
Start: 2018-03-01 | End: 2018-03-01

## 2018-03-01 RX ORDER — ACETAMINOPHEN 500 MG
650 TABLET ORAL ONCE
Qty: 0 | Refills: 0 | Status: COMPLETED | OUTPATIENT
Start: 2018-03-01 | End: 2018-03-01

## 2018-03-01 RX ORDER — ONDANSETRON 8 MG/1
4 TABLET, FILM COATED ORAL ONCE
Qty: 0 | Refills: 0 | Status: DISCONTINUED | OUTPATIENT
Start: 2018-03-01 | End: 2018-03-16

## 2018-03-01 RX ORDER — OXYCODONE HYDROCHLORIDE 5 MG/1
20 TABLET ORAL ONCE
Qty: 0 | Refills: 0 | Status: DISCONTINUED | OUTPATIENT
Start: 2018-03-01 | End: 2018-03-01

## 2018-03-01 RX ORDER — ACETAMINOPHEN 500 MG
1000 TABLET ORAL ONCE
Qty: 0 | Refills: 0 | Status: COMPLETED | OUTPATIENT
Start: 2018-03-01 | End: 2018-03-01

## 2018-03-01 RX ORDER — OXYCODONE HYDROCHLORIDE 5 MG/1
5 TABLET ORAL EVERY 4 HOURS
Qty: 0 | Refills: 0 | Status: DISCONTINUED | OUTPATIENT
Start: 2018-03-01 | End: 2018-03-01

## 2018-03-01 RX ORDER — SODIUM CHLORIDE 9 MG/ML
1000 INJECTION, SOLUTION INTRAVENOUS
Qty: 0 | Refills: 0 | Status: DISCONTINUED | OUTPATIENT
Start: 2018-03-01 | End: 2018-03-01

## 2018-03-01 RX ORDER — ASPIRIN/CALCIUM CARB/MAGNESIUM 324 MG
1 TABLET ORAL
Qty: 30 | Refills: 0 | OUTPATIENT
Start: 2018-03-01 | End: 2018-03-30

## 2018-03-01 RX ADMIN — FENTANYL CITRATE 50 MICROGRAM(S): 50 INJECTION INTRAVENOUS at 11:24

## 2018-03-01 RX ADMIN — Medication 400 MILLIGRAM(S): at 11:33

## 2018-03-01 RX ADMIN — Medication 1000 MILLIGRAM(S): at 14:01

## 2018-03-01 RX ADMIN — FENTANYL CITRATE 50 MICROGRAM(S): 50 INJECTION INTRAVENOUS at 11:19

## 2018-03-01 RX ADMIN — OXYCODONE HYDROCHLORIDE 20 MILLIGRAM(S): 5 TABLET ORAL at 07:17

## 2018-03-01 RX ADMIN — Medication 650 MILLIGRAM(S): at 07:18

## 2018-03-01 RX ADMIN — Medication 650 MILLIGRAM(S): at 07:16

## 2018-03-01 RX ADMIN — OXYCODONE HYDROCHLORIDE 10 MILLIGRAM(S): 5 TABLET ORAL at 11:24

## 2018-03-01 RX ADMIN — FENTANYL CITRATE 50 MICROGRAM(S): 50 INJECTION INTRAVENOUS at 11:11

## 2018-03-01 RX ADMIN — OXYCODONE HYDROCHLORIDE 10 MILLIGRAM(S): 5 TABLET ORAL at 10:26

## 2018-03-01 RX ADMIN — OXYCODONE HYDROCHLORIDE 20 MILLIGRAM(S): 5 TABLET ORAL at 07:18

## 2018-03-01 NOTE — ASU DISCHARGE PLAN (ADULT/PEDIATRIC). - SPECIAL INSTRUCTIONS
keep dressing clean dry and intact until seen in office. weight bearing as tolerated. encourage elbow range of motion.

## 2018-03-01 NOTE — BRIEF OPERATIVE NOTE - PROCEDURE
<<-----Click on this checkbox to enter Procedure Debridement of tissue of elbow for lateral epicondylitis  03/01/2018    Active  NFRANE

## 2018-03-01 NOTE — ASU DISCHARGE PLAN (ADULT/PEDIATRIC). - MEDICATION SUMMARY - MEDICATIONS TO TAKE
I will START or STAY ON the medications listed below when I get home from the hospital:    oxyCODONE-acetaminophen 5 mg-325 mg oral tablet  -- 1 tab(s) by mouth every 6 hours, As Needed  -for severe pain MDD:6   -- Caution federal law prohibits the transfer of this drug to any person other  than the person for whom it was prescribed.  May cause drowsiness.  Alcohol may intensify this effect.  Use care when operating dangerous machinery.  This prescription cannot be refilled.  This product contains acetaminophen.  Do not use  with any other product containing acetaminophen to prevent possible liver damage.  Using more of this medication than prescribed may cause serious breathing problems.    -- Indication: For prn pain medication    Aspirin Enteric Coated 81 mg oral delayed release tablet  -- 1 tab(s) by mouth once a day   -- Swallow whole.  Do not crush.  Take with food or milk.    -- Indication: For dvt prophylaxis    alogliptin 25 mg oral tablet  -- 1 tab(s) by mouth once a day  -- Indication: For home med    metFORMIN 500 mg oral tablet  -- 1 tab(s) by mouth 2 times a day  -- Indication: For home med    atorvastatin 10 mg oral tablet  -- 1 tab(s) by mouth once a day  -- Indication: For home med    losartan-hydroCHLOROthiazide 100 mg-25 mg oral tablet  -- 1 tab(s) by mouth once a day  -- Indication: For home med

## 2018-03-04 LAB — SURGICAL PATHOLOGY FINAL REPORT - CH: SIGNIFICANT CHANGE UP

## 2018-03-05 ENCOUNTER — NON-APPOINTMENT (OUTPATIENT)
Age: 48
End: 2018-03-05

## 2018-03-05 ENCOUNTER — APPOINTMENT (OUTPATIENT)
Dept: CARDIOLOGY | Facility: CLINIC | Age: 48
End: 2018-03-05
Payer: MEDICAID

## 2018-03-05 VITALS — HEART RATE: 66 BPM | SYSTOLIC BLOOD PRESSURE: 150 MMHG | DIASTOLIC BLOOD PRESSURE: 84 MMHG | OXYGEN SATURATION: 96 %

## 2018-03-05 VITALS — WEIGHT: 279 LBS | HEIGHT: 64 IN | BODY MASS INDEX: 47.63 KG/M2

## 2018-03-05 PROCEDURE — 99214 OFFICE O/P EST MOD 30 MIN: CPT | Mod: 25

## 2018-03-05 PROCEDURE — 93000 ELECTROCARDIOGRAM COMPLETE: CPT

## 2018-03-06 DIAGNOSIS — E11.9 TYPE 2 DIABETES MELLITUS WITHOUT COMPLICATIONS: ICD-10-CM

## 2018-03-06 DIAGNOSIS — E78.5 HYPERLIPIDEMIA, UNSPECIFIED: ICD-10-CM

## 2018-03-06 DIAGNOSIS — I10 ESSENTIAL (PRIMARY) HYPERTENSION: ICD-10-CM

## 2018-03-06 DIAGNOSIS — K21.9 GASTRO-ESOPHAGEAL REFLUX DISEASE WITHOUT ESOPHAGITIS: ICD-10-CM

## 2018-03-06 DIAGNOSIS — G47.33 OBSTRUCTIVE SLEEP APNEA (ADULT) (PEDIATRIC): ICD-10-CM

## 2018-03-06 DIAGNOSIS — Z96.652 PRESENCE OF LEFT ARTIFICIAL KNEE JOINT: ICD-10-CM

## 2018-03-06 DIAGNOSIS — M77.11 LATERAL EPICONDYLITIS, RIGHT ELBOW: ICD-10-CM

## 2018-04-11 ENCOUNTER — OUTPATIENT (OUTPATIENT)
Dept: OUTPATIENT SERVICES | Facility: HOSPITAL | Age: 48
LOS: 1 days | End: 2018-04-11
Payer: MEDICAID

## 2018-04-11 VITALS
RESPIRATION RATE: 16 BRPM | SYSTOLIC BLOOD PRESSURE: 138 MMHG | DIASTOLIC BLOOD PRESSURE: 83 MMHG | HEIGHT: 65 IN | HEART RATE: 74 BPM | TEMPERATURE: 98 F | WEIGHT: 270.07 LBS | OXYGEN SATURATION: 96 %

## 2018-04-11 DIAGNOSIS — E66.01 MORBID (SEVERE) OBESITY DUE TO EXCESS CALORIES: ICD-10-CM

## 2018-04-11 DIAGNOSIS — Z01.818 ENCOUNTER FOR OTHER PREPROCEDURAL EXAMINATION: ICD-10-CM

## 2018-04-11 DIAGNOSIS — Z96.652 PRESENCE OF LEFT ARTIFICIAL KNEE JOINT: Chronic | ICD-10-CM

## 2018-04-11 DIAGNOSIS — Z98.890 OTHER SPECIFIED POSTPROCEDURAL STATES: Chronic | ICD-10-CM

## 2018-04-11 DIAGNOSIS — E11.9 TYPE 2 DIABETES MELLITUS WITHOUT COMPLICATIONS: ICD-10-CM

## 2018-04-11 LAB
ALBUMIN SERPL ELPH-MCNC: 3.9 G/DL — SIGNIFICANT CHANGE UP (ref 3.3–5)
ALP SERPL-CCNC: 103 U/L — SIGNIFICANT CHANGE UP (ref 30–120)
ALT FLD-CCNC: 34 U/L DA — SIGNIFICANT CHANGE UP (ref 10–60)
ANION GAP SERPL CALC-SCNC: 10 MMOL/L — SIGNIFICANT CHANGE UP (ref 5–17)
AST SERPL-CCNC: 16 U/L — SIGNIFICANT CHANGE UP (ref 10–40)
BILIRUB SERPL-MCNC: 0.7 MG/DL — SIGNIFICANT CHANGE UP (ref 0.2–1.2)
BLD GP AB SCN SERPL QL: SIGNIFICANT CHANGE UP
BUN SERPL-MCNC: 18 MG/DL — SIGNIFICANT CHANGE UP (ref 7–23)
CALCIUM SERPL-MCNC: 9.8 MG/DL — SIGNIFICANT CHANGE UP (ref 8.4–10.5)
CHLORIDE SERPL-SCNC: 99 MMOL/L — SIGNIFICANT CHANGE UP (ref 96–108)
CO2 SERPL-SCNC: 29 MMOL/L — SIGNIFICANT CHANGE UP (ref 22–31)
CREAT SERPL-MCNC: 1.04 MG/DL — SIGNIFICANT CHANGE UP (ref 0.5–1.3)
GLUCOSE SERPL-MCNC: 262 MG/DL — HIGH (ref 70–99)
HBA1C BLD-MCNC: 10 % — HIGH (ref 4–5.6)
HCT VFR BLD CALC: 42.2 % — SIGNIFICANT CHANGE UP (ref 39–50)
HGB BLD-MCNC: 13.9 G/DL — SIGNIFICANT CHANGE UP (ref 13–17)
MCHC RBC-ENTMCNC: 26 PG — LOW (ref 27–34)
MCHC RBC-ENTMCNC: 33 GM/DL — SIGNIFICANT CHANGE UP (ref 32–36)
MCV RBC AUTO: 78.6 FL — LOW (ref 80–100)
PLATELET # BLD AUTO: 248 K/UL — SIGNIFICANT CHANGE UP (ref 150–400)
POTASSIUM SERPL-MCNC: 3.8 MMOL/L — SIGNIFICANT CHANGE UP (ref 3.5–5.3)
POTASSIUM SERPL-SCNC: 3.8 MMOL/L — SIGNIFICANT CHANGE UP (ref 3.5–5.3)
PROT SERPL-MCNC: 7.8 G/DL — SIGNIFICANT CHANGE UP (ref 6–8.3)
RBC # BLD: 5.36 M/UL — SIGNIFICANT CHANGE UP (ref 4.2–5.8)
RBC # FLD: 12.4 % — SIGNIFICANT CHANGE UP (ref 10.3–14.5)
SODIUM SERPL-SCNC: 138 MMOL/L — SIGNIFICANT CHANGE UP (ref 135–145)
WBC # BLD: 9 K/UL — SIGNIFICANT CHANGE UP (ref 3.8–10.5)
WBC # FLD AUTO: 9 K/UL — SIGNIFICANT CHANGE UP (ref 3.8–10.5)

## 2018-04-11 PROCEDURE — 86850 RBC ANTIBODY SCREEN: CPT

## 2018-04-11 PROCEDURE — G0463: CPT

## 2018-04-11 PROCEDURE — 85027 COMPLETE CBC AUTOMATED: CPT

## 2018-04-11 PROCEDURE — 86901 BLOOD TYPING SEROLOGIC RH(D): CPT

## 2018-04-11 PROCEDURE — 80053 COMPREHEN METABOLIC PANEL: CPT

## 2018-04-11 PROCEDURE — 86900 BLOOD TYPING SEROLOGIC ABO: CPT

## 2018-04-11 PROCEDURE — 83036 HEMOGLOBIN GLYCOSYLATED A1C: CPT

## 2018-04-11 RX ORDER — CHLORHEXIDINE GLUCONATE 213 G/1000ML
1 SOLUTION TOPICAL ONCE
Qty: 0 | Refills: 0 | Status: DISCONTINUED | OUTPATIENT
Start: 2018-04-11 | End: 2018-04-26

## 2018-04-11 NOTE — H&P PST ADULT - PROBLEM SELECTOR PLAN 1
"Laparoscopic sleeve gastrectomy w/upper endoscopy req PA" on 4/30/18  pre op instructions were reviewed and signed  patient will obtain medical clearance

## 2018-04-11 NOTE — H&P PST ADULT - NEGATIVE ENMT SYMPTOMS
no dry mouth/no throat pain/no sinus symptoms/no nasal congestion/no nasal discharge/no post-nasal discharge/no ear pain/no hearing difficulty/no recurrent cold sores/no abnormal taste sensation/no nasal obstruction/no nose bleeds/no gum bleeding/no tinnitus/no vertigo

## 2018-04-11 NOTE — H&P PST ADULT - PMH
Asthma    Carpal tunnel syndrome, left    Dyslipidemia    Essential hypertension    Morbid obesity    Sleep apnea    Type 2 diabetes mellitus

## 2018-04-11 NOTE — H&P PST ADULT - NEGATIVE ALLERGY TYPES
no outdoor environmental allergies/no reactions to food/no indoor environmental allergies/no reactions to medicines

## 2018-04-11 NOTE — H&P PST ADULT - NEGATIVE MUSCULOSKELETAL SYMPTOMS
no arm pain L/no arm pain R/no stiffness/no arthralgia/no arthritis/no back pain/no muscle cramps/no muscle weakness/no neck pain/no joint swelling/no myalgia

## 2018-04-30 ENCOUNTER — APPOINTMENT (OUTPATIENT)
Dept: BARIATRICS | Facility: HOSPITAL | Age: 48
End: 2018-04-30

## 2018-05-08 ENCOUNTER — APPOINTMENT (OUTPATIENT)
Dept: BARIATRICS | Facility: CLINIC | Age: 48
End: 2018-05-08

## 2018-05-22 ENCOUNTER — APPOINTMENT (OUTPATIENT)
Dept: PULMONOLOGY | Facility: CLINIC | Age: 48
End: 2018-05-22
Payer: MEDICAID

## 2018-05-22 ENCOUNTER — NON-APPOINTMENT (OUTPATIENT)
Age: 48
End: 2018-05-22

## 2018-05-22 VITALS
WEIGHT: 270 LBS | DIASTOLIC BLOOD PRESSURE: 80 MMHG | BODY MASS INDEX: 46.1 KG/M2 | HEIGHT: 64 IN | SYSTOLIC BLOOD PRESSURE: 130 MMHG | OXYGEN SATURATION: 97 % | RESPIRATION RATE: 17 BRPM | HEART RATE: 64 BPM

## 2018-05-22 PROCEDURE — 99214 OFFICE O/P EST MOD 30 MIN: CPT | Mod: 25

## 2018-05-22 PROCEDURE — 95012 NITRIC OXIDE EXP GAS DETER: CPT

## 2018-05-22 PROCEDURE — 94010 BREATHING CAPACITY TEST: CPT

## 2018-06-05 ENCOUNTER — APPOINTMENT (OUTPATIENT)
Dept: BARIATRICS | Facility: CLINIC | Age: 48
End: 2018-06-05

## 2018-06-05 ENCOUNTER — APPOINTMENT (OUTPATIENT)
Dept: BARIATRICS/WEIGHT MGMT | Facility: CLINIC | Age: 48
End: 2018-06-05

## 2018-06-26 ENCOUNTER — APPOINTMENT (OUTPATIENT)
Dept: BARIATRICS/WEIGHT MGMT | Facility: CLINIC | Age: 48
End: 2018-06-26
Payer: SELF-PAY

## 2018-06-26 ENCOUNTER — MEDICATION RENEWAL (OUTPATIENT)
Age: 48
End: 2018-06-26

## 2018-06-26 ENCOUNTER — APPOINTMENT (OUTPATIENT)
Dept: BARIATRICS | Facility: CLINIC | Age: 48
End: 2018-06-26
Payer: MEDICAID

## 2018-06-26 ENCOUNTER — APPOINTMENT (OUTPATIENT)
Dept: BARIATRICS/WEIGHT MGMT | Facility: CLINIC | Age: 48
End: 2018-06-26
Payer: COMMERCIAL

## 2018-06-26 VITALS
BODY MASS INDEX: 46.78 KG/M2 | DIASTOLIC BLOOD PRESSURE: 100 MMHG | OXYGEN SATURATION: 99 % | SYSTOLIC BLOOD PRESSURE: 140 MMHG | WEIGHT: 274 LBS | HEIGHT: 64 IN | HEART RATE: 71 BPM

## 2018-06-26 PROCEDURE — 99214 OFFICE O/P EST MOD 30 MIN: CPT

## 2018-06-26 PROCEDURE — 90791 PSYCH DIAGNOSTIC EVALUATION: CPT

## 2018-06-26 PROCEDURE — 97803 MED NUTRITION INDIV SUBSEQ: CPT

## 2018-06-26 RX ORDER — BECLOMETHASONE DIPROPIONATE 80 UG/1
80 AEROSOL, METERED NASAL
Qty: 3 | Refills: 1 | Status: COMPLETED | COMMUNITY
Start: 2018-05-22 | End: 2018-06-26

## 2018-06-26 RX ORDER — ALBUTEROL SULFATE 90 UG/1
108 (90 BASE) AEROSOL, METERED RESPIRATORY (INHALATION) EVERY 6 HOURS
Qty: 3 | Refills: 1 | Status: COMPLETED | COMMUNITY
Start: 2018-02-06 | End: 2018-06-26

## 2018-06-26 RX ORDER — DESLORATADINE 5 MG/1
5 TABLET, FILM COATED ORAL
Qty: 1 | Refills: 1 | Status: COMPLETED | COMMUNITY
Start: 2018-05-22 | End: 2018-06-26

## 2018-06-26 RX ORDER — FLUTICASONE FUROATE AND VILANTEROL TRIFENATATE 200; 25 UG/1; UG/1
200-25 POWDER RESPIRATORY (INHALATION) DAILY
Qty: 3 | Refills: 1 | Status: COMPLETED | COMMUNITY
Start: 2018-02-06 | End: 2018-06-26

## 2018-06-26 RX ORDER — METFORMIN HYDROCHLORIDE 500 MG/1
500 TABLET, COATED ORAL
Qty: 60 | Refills: 0 | Status: COMPLETED | COMMUNITY
Start: 2017-09-25 | End: 2018-06-26

## 2018-06-26 RX ORDER — ASPIRIN 81 MG/1
81 TABLET ORAL DAILY
Qty: 90 | Refills: 0 | Status: COMPLETED | COMMUNITY
End: 2018-06-26

## 2018-06-26 RX ORDER — OLOPATADINE HYDROCHLORIDE 665 UG/1
0.6 SPRAY, METERED NASAL
Qty: 3 | Refills: 1 | Status: COMPLETED | COMMUNITY
Start: 2018-05-22 | End: 2018-06-26

## 2018-06-26 RX ORDER — OXYCODONE AND ACETAMINOPHEN 5; 325 MG/1; MG/1
5-325 TABLET ORAL
Qty: 180 | Refills: 0 | Status: COMPLETED | COMMUNITY
End: 2018-06-26

## 2018-06-27 ENCOUNTER — MEDICATION RENEWAL (OUTPATIENT)
Age: 48
End: 2018-06-27

## 2018-06-28 ENCOUNTER — APPOINTMENT (OUTPATIENT)
Dept: CARDIOLOGY | Facility: CLINIC | Age: 48
End: 2018-06-28
Payer: MEDICAID

## 2018-06-28 ENCOUNTER — NON-APPOINTMENT (OUTPATIENT)
Age: 48
End: 2018-06-28

## 2018-06-28 VITALS — HEART RATE: 59 BPM | DIASTOLIC BLOOD PRESSURE: 95 MMHG | OXYGEN SATURATION: 98 % | SYSTOLIC BLOOD PRESSURE: 158 MMHG

## 2018-06-28 PROCEDURE — 93000 ELECTROCARDIOGRAM COMPLETE: CPT

## 2018-06-28 PROCEDURE — 99215 OFFICE O/P EST HI 40 MIN: CPT | Mod: 25

## 2018-07-05 ENCOUNTER — APPOINTMENT (OUTPATIENT)
Dept: CARDIOLOGY | Facility: CLINIC | Age: 48
End: 2018-07-05
Payer: MEDICAID

## 2018-07-05 VITALS
OXYGEN SATURATION: 96 % | BODY MASS INDEX: 45.58 KG/M2 | HEIGHT: 64 IN | WEIGHT: 267 LBS | DIASTOLIC BLOOD PRESSURE: 81 MMHG | SYSTOLIC BLOOD PRESSURE: 132 MMHG | HEART RATE: 72 BPM

## 2018-07-05 PROCEDURE — 99214 OFFICE O/P EST MOD 30 MIN: CPT

## 2018-07-10 ENCOUNTER — OUTPATIENT (OUTPATIENT)
Dept: OUTPATIENT SERVICES | Facility: HOSPITAL | Age: 48
LOS: 1 days | End: 2018-07-10
Payer: MEDICAID

## 2018-07-10 VITALS
OXYGEN SATURATION: 98 % | HEIGHT: 64 IN | DIASTOLIC BLOOD PRESSURE: 81 MMHG | WEIGHT: 265 LBS | RESPIRATION RATE: 16 BRPM | SYSTOLIC BLOOD PRESSURE: 118 MMHG

## 2018-07-10 DIAGNOSIS — Z96.652 PRESENCE OF LEFT ARTIFICIAL KNEE JOINT: Chronic | ICD-10-CM

## 2018-07-10 DIAGNOSIS — Z98.890 OTHER SPECIFIED POSTPROCEDURAL STATES: Chronic | ICD-10-CM

## 2018-07-10 DIAGNOSIS — E66.01 MORBID (SEVERE) OBESITY DUE TO EXCESS CALORIES: ICD-10-CM

## 2018-07-10 DIAGNOSIS — E11.9 TYPE 2 DIABETES MELLITUS WITHOUT COMPLICATIONS: ICD-10-CM

## 2018-07-10 DIAGNOSIS — G47.33 OBSTRUCTIVE SLEEP APNEA (ADULT) (PEDIATRIC): ICD-10-CM

## 2018-07-10 LAB
ANION GAP SERPL CALC-SCNC: 12 MMOL/L — SIGNIFICANT CHANGE UP (ref 5–17)
BLD GP AB SCN SERPL QL: SIGNIFICANT CHANGE UP
BUN SERPL-MCNC: 19 MG/DL — SIGNIFICANT CHANGE UP (ref 7–23)
CALCIUM SERPL-MCNC: 9.7 MG/DL — SIGNIFICANT CHANGE UP (ref 8.4–10.5)
CHLORIDE SERPL-SCNC: 100 MMOL/L — SIGNIFICANT CHANGE UP (ref 96–108)
CO2 SERPL-SCNC: 26 MMOL/L — SIGNIFICANT CHANGE UP (ref 22–31)
CREAT SERPL-MCNC: 1.14 MG/DL — SIGNIFICANT CHANGE UP (ref 0.5–1.3)
GLUCOSE SERPL-MCNC: 81 MG/DL — SIGNIFICANT CHANGE UP (ref 70–99)
HBA1C BLD-MCNC: 6.2 % — HIGH (ref 4–5.6)
HCT VFR BLD CALC: 43.3 % — SIGNIFICANT CHANGE UP (ref 39–50)
HGB BLD-MCNC: 13.9 G/DL — SIGNIFICANT CHANGE UP (ref 13–17)
MCHC RBC-ENTMCNC: 24.8 PG — LOW (ref 27–34)
MCHC RBC-ENTMCNC: 32.1 GM/DL — SIGNIFICANT CHANGE UP (ref 32–36)
MCV RBC AUTO: 77.2 FL — LOW (ref 80–100)
NRBC # BLD: 0 /100 WBCS — SIGNIFICANT CHANGE UP (ref 0–0)
PLATELET # BLD AUTO: 244 K/UL — SIGNIFICANT CHANGE UP (ref 150–400)
POTASSIUM SERPL-MCNC: 3.7 MMOL/L — SIGNIFICANT CHANGE UP (ref 3.5–5.3)
POTASSIUM SERPL-SCNC: 3.7 MMOL/L — SIGNIFICANT CHANGE UP (ref 3.5–5.3)
RBC # BLD: 5.61 M/UL — SIGNIFICANT CHANGE UP (ref 4.2–5.8)
RBC # FLD: 13.8 % — SIGNIFICANT CHANGE UP (ref 10.3–14.5)
SODIUM SERPL-SCNC: 138 MMOL/L — SIGNIFICANT CHANGE UP (ref 135–145)
WBC # BLD: 9.28 K/UL — SIGNIFICANT CHANGE UP (ref 3.8–10.5)
WBC # FLD AUTO: 9.28 K/UL — SIGNIFICANT CHANGE UP (ref 3.8–10.5)

## 2018-07-10 PROCEDURE — 86900 BLOOD TYPING SEROLOGIC ABO: CPT

## 2018-07-10 PROCEDURE — 85027 COMPLETE CBC AUTOMATED: CPT

## 2018-07-10 PROCEDURE — 86901 BLOOD TYPING SEROLOGIC RH(D): CPT

## 2018-07-10 PROCEDURE — 86850 RBC ANTIBODY SCREEN: CPT

## 2018-07-10 PROCEDURE — 83036 HEMOGLOBIN GLYCOSYLATED A1C: CPT

## 2018-07-10 PROCEDURE — 80048 BASIC METABOLIC PNL TOTAL CA: CPT

## 2018-07-10 NOTE — H&P PST ADULT - HISTORY OF PRESENT ILLNESS
48 yo male is scheduled for "Laparoscopic sleeve gastrectomy w/upper endoscopy req PA" on 4/30/18 with Love Mireles MD. 48 yo male is scheduled for "Laparoscopic sleeve gastrectomy w/upper endoscopy  for PST in NAD

## 2018-07-17 PROBLEM — G47.30 SLEEP APNEA, UNSPECIFIED: Chronic | Status: ACTIVE | Noted: 2018-02-26

## 2018-07-17 PROBLEM — G56.02 CARPAL TUNNEL SYNDROME, LEFT UPPER LIMB: Chronic | Status: INACTIVE | Noted: 2018-02-26 | Resolved: 2018-04-11

## 2018-07-17 PROBLEM — T14.8XXA OTHER INJURY OF UNSPECIFIED BODY REGION, INITIAL ENCOUNTER: Chronic | Status: INACTIVE | Noted: 2018-02-26 | Resolved: 2018-04-11

## 2018-07-17 PROBLEM — M17.5 OTHER UNILATERAL SECONDARY OSTEOARTHRITIS OF KNEE: Chronic | Status: INACTIVE | Noted: 2018-02-26 | Resolved: 2018-04-11

## 2018-07-17 PROBLEM — M25.521 PAIN IN RIGHT ELBOW: Chronic | Status: INACTIVE | Noted: 2018-02-26 | Resolved: 2018-04-11

## 2018-07-23 RX ORDER — INSULIN LISPRO 100/ML
VIAL (ML) SUBCUTANEOUS
Qty: 0 | Refills: 0 | Status: DISCONTINUED | OUTPATIENT
Start: 2018-07-30 | End: 2018-07-31

## 2018-07-23 RX ORDER — ONDANSETRON 8 MG/1
4 TABLET, FILM COATED ORAL EVERY 6 HOURS
Qty: 0 | Refills: 0 | Status: DISCONTINUED | OUTPATIENT
Start: 2018-07-30 | End: 2018-07-31

## 2018-07-23 RX ORDER — DEXTROSE 50 % IN WATER 50 %
25 SYRINGE (ML) INTRAVENOUS ONCE
Qty: 0 | Refills: 0 | Status: DISCONTINUED | OUTPATIENT
Start: 2018-07-30 | End: 2018-07-31

## 2018-07-23 RX ORDER — SODIUM CHLORIDE 9 MG/ML
1000 INJECTION, SOLUTION INTRAVENOUS
Qty: 0 | Refills: 0 | Status: DISCONTINUED | OUTPATIENT
Start: 2018-07-30 | End: 2018-07-31

## 2018-07-23 RX ORDER — DEXTROSE 50 % IN WATER 50 %
15 SYRINGE (ML) INTRAVENOUS ONCE
Qty: 0 | Refills: 0 | Status: DISCONTINUED | OUTPATIENT
Start: 2018-07-30 | End: 2018-07-31

## 2018-07-23 RX ORDER — APREPITANT 80 MG/1
40 CAPSULE ORAL ONCE
Qty: 0 | Refills: 0 | Status: COMPLETED | OUTPATIENT
Start: 2018-07-30 | End: 2018-07-30

## 2018-07-23 RX ORDER — GLUCAGON INJECTION, SOLUTION 0.5 MG/.1ML
1 INJECTION, SOLUTION SUBCUTANEOUS ONCE
Qty: 0 | Refills: 0 | Status: DISCONTINUED | OUTPATIENT
Start: 2018-07-30 | End: 2018-07-31

## 2018-07-23 RX ORDER — HYDROMORPHONE HYDROCHLORIDE 2 MG/ML
0.5 INJECTION INTRAMUSCULAR; INTRAVENOUS; SUBCUTANEOUS EVERY 4 HOURS
Qty: 0 | Refills: 0 | Status: DISCONTINUED | OUTPATIENT
Start: 2018-07-30 | End: 2018-07-31

## 2018-07-23 RX ORDER — HYOSCYAMINE SULFATE 0.13 MG
0.12 TABLET ORAL EVERY 6 HOURS
Qty: 0 | Refills: 0 | Status: DISCONTINUED | OUTPATIENT
Start: 2018-07-30 | End: 2018-07-31

## 2018-07-23 RX ORDER — PANTOPRAZOLE SODIUM 20 MG/1
40 TABLET, DELAYED RELEASE ORAL DAILY
Qty: 0 | Refills: 0 | Status: DISCONTINUED | OUTPATIENT
Start: 2018-07-30 | End: 2018-07-31

## 2018-07-23 RX ORDER — ENOXAPARIN SODIUM 100 MG/ML
40 INJECTION SUBCUTANEOUS EVERY 12 HOURS
Qty: 0 | Refills: 0 | Status: DISCONTINUED | OUTPATIENT
Start: 2018-07-30 | End: 2018-07-31

## 2018-07-23 RX ORDER — ENOXAPARIN SODIUM 100 MG/ML
40 INJECTION SUBCUTANEOUS ONCE
Qty: 0 | Refills: 0 | Status: COMPLETED | OUTPATIENT
Start: 2018-07-30 | End: 2018-07-30

## 2018-07-23 RX ORDER — DEXTROSE 50 % IN WATER 50 %
12.5 SYRINGE (ML) INTRAVENOUS ONCE
Qty: 0 | Refills: 0 | Status: DISCONTINUED | OUTPATIENT
Start: 2018-07-30 | End: 2018-07-31

## 2018-07-29 ENCOUNTER — TRANSCRIPTION ENCOUNTER (OUTPATIENT)
Age: 48
End: 2018-07-29

## 2018-07-30 ENCOUNTER — INPATIENT (INPATIENT)
Facility: HOSPITAL | Age: 48
LOS: 0 days | Discharge: HOME | DRG: 621 | End: 2018-07-31
Attending: SURGERY | Admitting: SURGERY
Payer: MEDICAID

## 2018-07-30 ENCOUNTER — APPOINTMENT (OUTPATIENT)
Dept: BARIATRICS | Facility: HOSPITAL | Age: 48
End: 2018-07-30
Payer: MEDICAID

## 2018-07-30 ENCOUNTER — RESULT REVIEW (OUTPATIENT)
Age: 48
End: 2018-07-30

## 2018-07-30 VITALS
TEMPERATURE: 98 F | RESPIRATION RATE: 18 BRPM | HEIGHT: 65 IN | OXYGEN SATURATION: 100 % | WEIGHT: 259.93 LBS | SYSTOLIC BLOOD PRESSURE: 152 MMHG | DIASTOLIC BLOOD PRESSURE: 90 MMHG | HEART RATE: 55 BPM

## 2018-07-30 DIAGNOSIS — E11.9 TYPE 2 DIABETES MELLITUS WITHOUT COMPLICATIONS: ICD-10-CM

## 2018-07-30 DIAGNOSIS — I10 ESSENTIAL (PRIMARY) HYPERTENSION: ICD-10-CM

## 2018-07-30 DIAGNOSIS — E66.01 MORBID (SEVERE) OBESITY DUE TO EXCESS CALORIES: ICD-10-CM

## 2018-07-30 DIAGNOSIS — Z98.890 OTHER SPECIFIED POSTPROCEDURAL STATES: Chronic | ICD-10-CM

## 2018-07-30 DIAGNOSIS — Z96.652 PRESENCE OF LEFT ARTIFICIAL KNEE JOINT: Chronic | ICD-10-CM

## 2018-07-30 DIAGNOSIS — G47.30 SLEEP APNEA, UNSPECIFIED: ICD-10-CM

## 2018-07-30 DIAGNOSIS — J45.909 UNSPECIFIED ASTHMA, UNCOMPLICATED: ICD-10-CM

## 2018-07-30 LAB
GLUCOSE BLDC GLUCOMTR-MCNC: 131 MG/DL — HIGH (ref 70–99)
GLUCOSE BLDC GLUCOMTR-MCNC: 131 MG/DL — HIGH (ref 70–99)
GLUCOSE BLDC GLUCOMTR-MCNC: 136 MG/DL — HIGH (ref 70–99)
GLUCOSE BLDC GLUCOMTR-MCNC: 93 MG/DL — SIGNIFICANT CHANGE UP (ref 70–99)

## 2018-07-30 PROCEDURE — 43775 LAP SLEEVE GASTRECTOMY: CPT | Mod: AS

## 2018-07-30 PROCEDURE — 43775 LAP SLEEVE GASTRECTOMY: CPT

## 2018-07-30 PROCEDURE — 88305 TISSUE EXAM BY PATHOLOGIST: CPT | Mod: 26

## 2018-07-30 RX ORDER — HYDROMORPHONE HYDROCHLORIDE 2 MG/ML
0.5 INJECTION INTRAMUSCULAR; INTRAVENOUS; SUBCUTANEOUS
Qty: 0 | Refills: 0 | Status: DISCONTINUED | OUTPATIENT
Start: 2018-07-30 | End: 2018-07-30

## 2018-07-30 RX ORDER — SODIUM CHLORIDE 9 MG/ML
1000 INJECTION, SOLUTION INTRAVENOUS
Qty: 0 | Refills: 0 | Status: DISCONTINUED | OUTPATIENT
Start: 2018-07-30 | End: 2018-07-30

## 2018-07-30 RX ORDER — ACETAMINOPHEN 500 MG
1000 TABLET ORAL EVERY 6 HOURS
Qty: 0 | Refills: 0 | Status: COMPLETED | OUTPATIENT
Start: 2018-07-30 | End: 2018-07-31

## 2018-07-30 RX ORDER — ACETAMINOPHEN 500 MG
1000 TABLET ORAL EVERY 6 HOURS
Qty: 0 | Refills: 0 | Status: DISCONTINUED | OUTPATIENT
Start: 2018-07-31 | End: 2018-07-31

## 2018-07-30 RX ORDER — METOPROLOL TARTRATE 50 MG
5 TABLET ORAL EVERY 6 HOURS
Qty: 0 | Refills: 0 | Status: DISCONTINUED | OUTPATIENT
Start: 2018-07-30 | End: 2018-07-31

## 2018-07-30 RX ORDER — ALBUTEROL 90 UG/1
2 AEROSOL, METERED ORAL
Qty: 0 | Refills: 0 | COMMUNITY

## 2018-07-30 RX ORDER — CHLORHEXIDINE GLUCONATE 213 G/1000ML
1 SOLUTION TOPICAL ONCE
Qty: 0 | Refills: 0 | Status: COMPLETED | OUTPATIENT
Start: 2018-07-30 | End: 2018-07-30

## 2018-07-30 RX ORDER — FLUTICASONE FUROATE AND VILANTEROL TRIFENATATE 100; 25 UG/1; UG/1
1 POWDER RESPIRATORY (INHALATION)
Qty: 0 | Refills: 0 | COMMUNITY

## 2018-07-30 RX ORDER — ALOGLIPTIN 12.5 MG/1
1 TABLET, FILM COATED ORAL
Qty: 0 | Refills: 0 | COMMUNITY

## 2018-07-30 RX ORDER — ACETAMINOPHEN 500 MG
1000 TABLET ORAL ONCE
Qty: 0 | Refills: 0 | Status: COMPLETED | OUTPATIENT
Start: 2018-07-30 | End: 2018-07-30

## 2018-07-30 RX ORDER — CEFAZOLIN SODIUM 1 G
3000 VIAL (EA) INJECTION ONCE
Qty: 0 | Refills: 0 | Status: COMPLETED | OUTPATIENT
Start: 2018-07-30 | End: 2018-07-30

## 2018-07-30 RX ORDER — IBUPROFEN 200 MG
800 TABLET ORAL EVERY 6 HOURS
Qty: 0 | Refills: 0 | Status: DISCONTINUED | OUTPATIENT
Start: 2018-07-30 | End: 2018-07-31

## 2018-07-30 RX ADMIN — SODIUM CHLORIDE 150 MILLILITER(S): 9 INJECTION, SOLUTION INTRAVENOUS at 19:48

## 2018-07-30 RX ADMIN — ENOXAPARIN SODIUM 40 MILLIGRAM(S): 100 INJECTION SUBCUTANEOUS at 07:00

## 2018-07-30 RX ADMIN — Medication 1000 MILLIGRAM(S): at 16:30

## 2018-07-30 RX ADMIN — HYDROMORPHONE HYDROCHLORIDE 0.5 MILLIGRAM(S): 2 INJECTION INTRAMUSCULAR; INTRAVENOUS; SUBCUTANEOUS at 11:02

## 2018-07-30 RX ADMIN — Medication 516 MILLIGRAM(S): at 13:38

## 2018-07-30 RX ADMIN — ENOXAPARIN SODIUM 40 MILLIGRAM(S): 100 INJECTION SUBCUTANEOUS at 19:46

## 2018-07-30 RX ADMIN — HYDROMORPHONE HYDROCHLORIDE 0.5 MILLIGRAM(S): 2 INJECTION INTRAMUSCULAR; INTRAVENOUS; SUBCUTANEOUS at 10:34

## 2018-07-30 RX ADMIN — HYDROMORPHONE HYDROCHLORIDE 0.5 MILLIGRAM(S): 2 INJECTION INTRAMUSCULAR; INTRAVENOUS; SUBCUTANEOUS at 12:01

## 2018-07-30 RX ADMIN — ONDANSETRON 4 MILLIGRAM(S): 8 TABLET, FILM COATED ORAL at 17:57

## 2018-07-30 RX ADMIN — CHLORHEXIDINE GLUCONATE 1 APPLICATION(S): 213 SOLUTION TOPICAL at 06:55

## 2018-07-30 RX ADMIN — Medication 400 MILLIGRAM(S): at 21:15

## 2018-07-30 RX ADMIN — HYDROMORPHONE HYDROCHLORIDE 0.5 MILLIGRAM(S): 2 INJECTION INTRAMUSCULAR; INTRAVENOUS; SUBCUTANEOUS at 11:13

## 2018-07-30 RX ADMIN — Medication 400 MILLIGRAM(S): at 15:34

## 2018-07-30 RX ADMIN — HYDROMORPHONE HYDROCHLORIDE 0.5 MILLIGRAM(S): 2 INJECTION INTRAMUSCULAR; INTRAVENOUS; SUBCUTANEOUS at 12:10

## 2018-07-30 RX ADMIN — Medication 1000 MILLIGRAM(S): at 21:16

## 2018-07-30 RX ADMIN — HYDROMORPHONE HYDROCHLORIDE 0.5 MILLIGRAM(S): 2 INJECTION INTRAMUSCULAR; INTRAVENOUS; SUBCUTANEOUS at 10:44

## 2018-07-30 RX ADMIN — APREPITANT 40 MILLIGRAM(S): 80 CAPSULE ORAL at 07:01

## 2018-07-30 RX ADMIN — SODIUM CHLORIDE 150 MILLILITER(S): 9 INJECTION, SOLUTION INTRAVENOUS at 21:15

## 2018-07-30 RX ADMIN — HYDROMORPHONE HYDROCHLORIDE 0.5 MILLIGRAM(S): 2 INJECTION INTRAMUSCULAR; INTRAVENOUS; SUBCUTANEOUS at 19:15

## 2018-07-30 RX ADMIN — SODIUM CHLORIDE 150 MILLILITER(S): 9 INJECTION, SOLUTION INTRAVENOUS at 10:26

## 2018-07-30 RX ADMIN — HYDROMORPHONE HYDROCHLORIDE 0.5 MILLIGRAM(S): 2 INJECTION INTRAMUSCULAR; INTRAVENOUS; SUBCUTANEOUS at 18:47

## 2018-07-30 NOTE — CONSULT NOTE ADULT - SUBJECTIVE AND OBJECTIVE BOX
PULMONARY/CRITICAL CARE        Patient is a 47y old  Male who presents with a chief complaint of "weight loss surgery-Gastric sleeve.  BRIEF HOSPITAL COURSE: ***    Events last 24 hours: ***    PAST MEDICAL & SURGICAL HISTORY:  Carpal tunnel syndrome, left  Asthma--mild, uses prn inhalers  Dyslipidemia  Morbid obesity  Type 2 diabetes mellitus  Essential hypertension  Sleep apnea on cpap 14--Dr. Min  History of elbow surgery: right, 2018  H/O total knee replacement, left: 2016    Allergies    No Known Allergies    Intolerances      FAMILY HISTORY:  Family history of brain cancer (Uncle)      Review of Systems:  CONSTITUTIONAL: No fever, chills, or fatigue  EYES: No eye pain, visual disturbances, or discharge  ENMT:  No difficulty hearing, tinnitus, vertigo; No sinus or throat pain  NECK: No pain or stiffness  RESPIRATORY: No cough, wheezing, chills or hemoptysis; No shortness of breath  CARDIOVASCULAR: No chest pain, palpitations, dizziness, or leg swelling  GASTROINTESTINAL: No abdominal or epigastric pain. No nausea, vomiting, or hematemesis; No diarrhea or constipation. No melena or hematochezia.  GENITOURINARY: No dysuria, frequency, hematuria, or incontinence  NEUROLOGICAL: No headaches, memory loss, loss of strength, numbness, or tremors  SKIN: No itching, burning, rashes, or lesions   MUSCULOSKELETAL: No joint pain or swelling; No muscle, back, or extremity pain  PSYCHIATRIC: No depression, anxiety, mood swings, or difficulty sleeping      Medications:                                  ICU Vital Signs Last 24 Hrs  T(C): 36.5 (30 Jul 2018 06:16), Max: 36.5 (30 Jul 2018 06:16)  T(F): 97.7 (30 Jul 2018 06:16), Max: 97.7 (30 Jul 2018 06:16)  HR: 55 (30 Jul 2018 06:16) (55 - 55)  BP: 152/90 (30 Jul 2018 06:16) (152/90 - 152/90)  BP(mean): --  ABP: --  ABP(mean): --  RR: 18 (30 Jul 2018 06:16) (18 - 18)  SpO2: 100% (30 Jul 2018 06:16) (100% - 100%)    Vital Signs Last 24 Hrs  T(C): 36.5 (30 Jul 2018 06:16), Max: 36.5 (30 Jul 2018 06:16)  T(F): 97.7 (30 Jul 2018 06:16), Max: 97.7 (30 Jul 2018 06:16)  HR: 55 (30 Jul 2018 06:16) (55 - 55)  BP: 152/90 (30 Jul 2018 06:16) (152/90 - 152/90)  BP(mean): --  RR: 18 (30 Jul 2018 06:16) (18 - 18)  SpO2: 100% (30 Jul 2018 06:16) (100% - 100%)        I&O's Detail        LABS:                CAPILLARY BLOOD GLUCOSE      POCT Blood Glucose.: 93 mg/dL (30 Jul 2018 06:27)        CULTURES:      Physical Examination:    General: No acute distress.  Obese male,     HEENT: Pupils equal, reactive to light.  Symmetric.    PULM: Clear to auscultation bilaterally, no significant sputum production    CVS: Regular rate and rhythm, no murmurs, rubs, or gallops    ABD: Soft, nondistended, nontender, normoactive bowel sounds, no masses    EXT: No edema, nontender    SKIN: Warm and well perfused, no rashes noted.    NEURO: Alert, oriented, interactive, nonfocal    RADIOLOGY: ***< from: Xray Chest 2 Views PA/Lat (02.26.18 @ 09:54) >    EXAM:  XR CHEST PA LAT 2V                            PROCEDURE DATE:  02/26/2018          INTERPRETATION:  Exam Date: 2/26/2018 9:54 AM    Chest radiographs        CLINICAL INFORMATION:  Preadmission radiograph    COMPARISON: April 6, 2016    TECHNIQUE:  Frontal and lateral views of the chest were obtained.    FINDINGS/  IMPRESSION:      The lungs are clear.  No pleural abnormality is seen.      The heart and mediastinum appear intact.                         LYSSA ATWOOD M.D., ATTENDING RADIOLOGIST  This document has been electronically signed. Feb 26 2018 10:13AM        < end of copied text >      CRITICAL CARE TIME SPENT: *** PULMONARY/CRITICAL CARE        Patient is a 47y old  Male who presents with a chief complaint of "weight loss surgery-Gastric sleeve.  BRIEF HOSPITAL COURSE: ***    Events last 24 hours: ***    PAST MEDICAL & SURGICAL HISTORY:  Carpal tunnel syndrome, left  Asthma--mild, uses prn inhalers  Dyslipidemia  Morbid obesity  Type 2 diabetes mellitus  Essential hypertension  Sleep apnea on cpap 14--Dr. Min  History of elbow surgery: right, 2018  H/O total knee replacement, left: 2016    Allergies    No Known Allergies    Intolerances      FAMILY HISTORY and SOCIAL: unemployed, Smoked 1ppd for 5 yrs untiil 15 yrs ago  Denies ETOH  Family history of brain cancer (Uncle)      Review of Systems:  CONSTITUTIONAL: No fever, chills, or fatigue  EYES: No eye pain, visual disturbances, or discharge  ENMT:  No difficulty hearing, tinnitus, vertigo; No sinus or throat pain  NECK: No pain or stiffness  RESPIRATORY: No cough, wheezing, chills or hemoptysis; No shortness of breath  CARDIOVASCULAR: No chest pain, palpitations, dizziness, or leg swelling  GASTROINTESTINAL: No abdominal or epigastric pain. No nausea, vomiting, or hematemesis; No diarrhea or constipation. No melena or hematochezia.  GENITOURINARY: No dysuria, frequency, hematuria, or incontinence  NEUROLOGICAL: No headaches, memory loss, loss of strength, numbness, or tremors  SKIN: No itching, burning, rashes, or lesions   MUSCULOSKELETAL: No joint pain or swelling; No muscle, back, or extremity pain  PSYCHIATRIC: No depression, anxiety, mood swings, or difficulty sleeping      Medications:                                  ICU Vital Signs Last 24 Hrs  T(C): 36.5 (30 Jul 2018 06:16), Max: 36.5 (30 Jul 2018 06:16)  T(F): 97.7 (30 Jul 2018 06:16), Max: 97.7 (30 Jul 2018 06:16)  HR: 55 (30 Jul 2018 06:16) (55 - 55)  BP: 152/90 (30 Jul 2018 06:16) (152/90 - 152/90)  BP(mean): --  ABP: --  ABP(mean): --  RR: 18 (30 Jul 2018 06:16) (18 - 18)  SpO2: 100% (30 Jul 2018 06:16) (100% - 100%)    Vital Signs Last 24 Hrs  T(C): 36.5 (30 Jul 2018 06:16), Max: 36.5 (30 Jul 2018 06:16)  T(F): 97.7 (30 Jul 2018 06:16), Max: 97.7 (30 Jul 2018 06:16)  HR: 55 (30 Jul 2018 06:16) (55 - 55)  BP: 152/90 (30 Jul 2018 06:16) (152/90 - 152/90)  BP(mean): --  RR: 18 (30 Jul 2018 06:16) (18 - 18)  SpO2: 100% (30 Jul 2018 06:16) (100% - 100%)        I&O's Detail        LABS:                CAPILLARY BLOOD GLUCOSE      POCT Blood Glucose.: 93 mg/dL (30 Jul 2018 06:27)        CULTURES:      Physical Examination:    General: No acute distress.  Obese male,     HEENT: Pupils equal, reactive to light.  Symmetric.    PULM: Clear to auscultation bilaterally, no significant sputum production    CVS: Regular rate and rhythm, no murmurs, rubs, or gallops    ABD: Soft, nondistended, nontender, normoactive bowel sounds, no masses    EXT: No edema, nontender    SKIN: Warm and well perfused, no rashes noted.    NEURO: Alert, oriented, interactive, nonfocal    RADIOLOGY: ***< from: Xray Chest 2 Views PA/Lat (02.26.18 @ 09:54) >    EXAM:  XR CHEST PA LAT 2V                            PROCEDURE DATE:  02/26/2018          INTERPRETATION:  Exam Date: 2/26/2018 9:54 AM    Chest radiographs        CLINICAL INFORMATION:  Preadmission radiograph    COMPARISON: April 6, 2016    TECHNIQUE:  Frontal and lateral views of the chest were obtained.    FINDINGS/  IMPRESSION:      The lungs are clear.  No pleural abnormality is seen.      The heart and mediastinum appear intact.                         LYSSA ATWOOD M.D., ATTENDING RADIOLOGIST  This document has been electronically signed. Feb 26 2018 10:13AM        < end of copied text >      CRITICAL CARE TIME SPENT: ***

## 2018-07-30 NOTE — BRIEF OPERATIVE NOTE - PROCEDURE
<<-----Click on this checkbox to enter Procedure Gastrectomy, sleeve, laparoscopic, with EDG  07/30/2018    Active  [email or web address]

## 2018-07-31 ENCOUNTER — TRANSCRIPTION ENCOUNTER (OUTPATIENT)
Age: 48
End: 2018-07-31

## 2018-07-31 VITALS
RESPIRATION RATE: 16 BRPM | OXYGEN SATURATION: 99 % | DIASTOLIC BLOOD PRESSURE: 77 MMHG | SYSTOLIC BLOOD PRESSURE: 130 MMHG | HEART RATE: 50 BPM | TEMPERATURE: 98 F

## 2018-07-31 DIAGNOSIS — Z98.84 BARIATRIC SURGERY STATUS: ICD-10-CM

## 2018-07-31 LAB
ANION GAP SERPL CALC-SCNC: 6 MMOL/L — SIGNIFICANT CHANGE UP (ref 5–17)
BUN SERPL-MCNC: 14 MG/DL — SIGNIFICANT CHANGE UP (ref 7–23)
CALCIUM SERPL-MCNC: 8.9 MG/DL — SIGNIFICANT CHANGE UP (ref 8.4–10.5)
CHLORIDE SERPL-SCNC: 105 MMOL/L — SIGNIFICANT CHANGE UP (ref 96–108)
CO2 SERPL-SCNC: 28 MMOL/L — SIGNIFICANT CHANGE UP (ref 22–31)
CREAT SERPL-MCNC: 0.96 MG/DL — SIGNIFICANT CHANGE UP (ref 0.5–1.3)
GLUCOSE BLDC GLUCOMTR-MCNC: 107 MG/DL — HIGH (ref 70–99)
GLUCOSE BLDC GLUCOMTR-MCNC: 115 MG/DL — HIGH (ref 70–99)
GLUCOSE BLDC GLUCOMTR-MCNC: 88 MG/DL — SIGNIFICANT CHANGE UP (ref 70–99)
GLUCOSE SERPL-MCNC: 127 MG/DL — HIGH (ref 70–99)
HCT VFR BLD CALC: 38.8 % — LOW (ref 39–50)
HGB BLD-MCNC: 12.5 G/DL — LOW (ref 13–17)
MCHC RBC-ENTMCNC: 24.9 PG — LOW (ref 27–34)
MCHC RBC-ENTMCNC: 32.2 GM/DL — SIGNIFICANT CHANGE UP (ref 32–36)
MCV RBC AUTO: 77.3 FL — LOW (ref 80–100)
NRBC # BLD: 0 /100 WBCS — SIGNIFICANT CHANGE UP (ref 0–0)
PLATELET # BLD AUTO: 216 K/UL — SIGNIFICANT CHANGE UP (ref 150–400)
POTASSIUM SERPL-MCNC: 4.6 MMOL/L — SIGNIFICANT CHANGE UP (ref 3.5–5.3)
POTASSIUM SERPL-SCNC: 4.6 MMOL/L — SIGNIFICANT CHANGE UP (ref 3.5–5.3)
RBC # BLD: 5.02 M/UL — SIGNIFICANT CHANGE UP (ref 4.2–5.8)
RBC # FLD: 14.5 % — SIGNIFICANT CHANGE UP (ref 10.3–14.5)
SODIUM SERPL-SCNC: 139 MMOL/L — SIGNIFICANT CHANGE UP (ref 135–145)
SURGICAL PATHOLOGY FINAL REPORT - CH: SIGNIFICANT CHANGE UP
WBC # BLD: 9.55 K/UL — SIGNIFICANT CHANGE UP (ref 3.8–10.5)
WBC # FLD AUTO: 9.55 K/UL — SIGNIFICANT CHANGE UP (ref 3.8–10.5)

## 2018-07-31 PROCEDURE — 80048 BASIC METABOLIC PNL TOTAL CA: CPT

## 2018-07-31 PROCEDURE — 88305 TISSUE EXAM BY PATHOLOGIST: CPT

## 2018-07-31 PROCEDURE — 82962 GLUCOSE BLOOD TEST: CPT

## 2018-07-31 PROCEDURE — 94660 CPAP INITIATION&MGMT: CPT

## 2018-07-31 PROCEDURE — 94664 DEMO&/EVAL PT USE INHALER: CPT

## 2018-07-31 PROCEDURE — C1889: CPT

## 2018-07-31 PROCEDURE — 36415 COLL VENOUS BLD VENIPUNCTURE: CPT

## 2018-07-31 PROCEDURE — 85027 COMPLETE CBC AUTOMATED: CPT

## 2018-07-31 RX ORDER — ATORVASTATIN CALCIUM 80 MG/1
1 TABLET, FILM COATED ORAL
Qty: 0 | Refills: 0 | COMMUNITY

## 2018-07-31 RX ORDER — ONDANSETRON 8 MG/1
1 TABLET, FILM COATED ORAL
Qty: 0 | Refills: 0 | COMMUNITY

## 2018-07-31 RX ORDER — METFORMIN HYDROCHLORIDE 850 MG/1
1 TABLET ORAL
Qty: 0 | Refills: 0 | COMMUNITY

## 2018-07-31 RX ORDER — OMEPRAZOLE 10 MG/1
1 CAPSULE, DELAYED RELEASE ORAL
Qty: 0 | Refills: 0 | COMMUNITY

## 2018-07-31 RX ORDER — LOSARTAN/HYDROCHLOROTHIAZIDE 100MG-25MG
1 TABLET ORAL
Qty: 0 | Refills: 0 | COMMUNITY

## 2018-07-31 RX ORDER — LOSARTAN POTASSIUM 100 MG/1
1 TABLET, FILM COATED ORAL
Qty: 30 | Refills: 0 | OUTPATIENT
Start: 2018-07-31 | End: 2018-08-29

## 2018-07-31 RX ADMIN — ONDANSETRON 4 MILLIGRAM(S): 8 TABLET, FILM COATED ORAL at 05:04

## 2018-07-31 RX ADMIN — Medication 800 MILLIGRAM(S): at 08:02

## 2018-07-31 RX ADMIN — HYDROMORPHONE HYDROCHLORIDE 0.5 MILLIGRAM(S): 2 INJECTION INTRAMUSCULAR; INTRAVENOUS; SUBCUTANEOUS at 00:24

## 2018-07-31 RX ADMIN — ONDANSETRON 4 MILLIGRAM(S): 8 TABLET, FILM COATED ORAL at 12:29

## 2018-07-31 RX ADMIN — PANTOPRAZOLE SODIUM 40 MILLIGRAM(S): 20 TABLET, DELAYED RELEASE ORAL at 12:28

## 2018-07-31 RX ADMIN — SODIUM CHLORIDE 150 MILLILITER(S): 9 INJECTION, SOLUTION INTRAVENOUS at 12:31

## 2018-07-31 RX ADMIN — ONDANSETRON 4 MILLIGRAM(S): 8 TABLET, FILM COATED ORAL at 00:11

## 2018-07-31 RX ADMIN — HYDROMORPHONE HYDROCHLORIDE 0.5 MILLIGRAM(S): 2 INJECTION INTRAMUSCULAR; INTRAVENOUS; SUBCUTANEOUS at 05:25

## 2018-07-31 RX ADMIN — HYDROMORPHONE HYDROCHLORIDE 0.5 MILLIGRAM(S): 2 INJECTION INTRAMUSCULAR; INTRAVENOUS; SUBCUTANEOUS at 05:01

## 2018-07-31 RX ADMIN — HYDROMORPHONE HYDROCHLORIDE 0.5 MILLIGRAM(S): 2 INJECTION INTRAMUSCULAR; INTRAVENOUS; SUBCUTANEOUS at 00:45

## 2018-07-31 RX ADMIN — Medication 1000 MILLIGRAM(S): at 03:02

## 2018-07-31 RX ADMIN — Medication 516 MILLIGRAM(S): at 07:52

## 2018-07-31 RX ADMIN — Medication 400 MILLIGRAM(S): at 03:02

## 2018-07-31 RX ADMIN — ENOXAPARIN SODIUM 40 MILLIGRAM(S): 100 INJECTION SUBCUTANEOUS at 06:49

## 2018-07-31 NOTE — DISCHARGE NOTE ADULT - PLAN OF CARE
Instructed to ambulate and use incentive spirometry frequently. Ice packs to abdominal wall and shoulders as needed for discomfort. Cont bariatric diet and follow nutritional guidelines as instructed. Pain meds as needed. Pt instructed  to follow up with Dr. Mireles in 1 week. Restriction of dietary intake and return to normal BMI.

## 2018-07-31 NOTE — DISCHARGE NOTE ADULT - INSTRUCTIONS
Instructed to ambulate and use incentive spirometry frequently. Ice packs to abdominal wall and shoulders as needed for discomfort. Cont bariatric diet and follow nutritional guidelines as instructed. Pain meds as needed. Pt instructed  to follow up with Dr. Mireles in 1 week. follow up with DR. Mireles

## 2018-07-31 NOTE — DISCHARGE NOTE ADULT - HOSPITAL COURSE
47 year old male with history of morbid obesity s/p laparoscopic sleeve gastrectomy with and intraoperative EGD. Post operatively patient did well, good urine output  and ambulating well on floor. Patient tolerated advancement of diet to bariatric clears.  Nutritional guidelines were reviewed with the nutritionist. Patient felt ready for discharge to home. Pt instructed to drink small frequent amounts, start protein drinks and follow dietary guidelines.

## 2018-07-31 NOTE — DISCHARGE NOTE ADULT - MEDICATION SUMMARY - MEDICATIONS TO TAKE
I will START or STAY ON the medications listed below when I get home from the hospital:    oxyCODONE-acetaminophen 5 mg-325 mg oral tablet  -- 1 tab(s) by mouth every 6 hours, As Needed - for moderate pain, crush and put in low fat yogurt or pudding.   -- Indication: For S/P laparoscopic sleeve gastrectomy    losartan 100 mg oral tablet  -- 1 tab(s) by mouth once a day, crush and put in low fat yogurt or pudding.   -- Do not take this drug if you are pregnant.  It is very important that you take or use this exactly as directed.  Do not skip doses or discontinue unless directed by your doctor.  Some non-prescription drugs may aggravate your condition.  Read all labels carefully.  If a warning appears, check with your doctor before taking.    -- Indication: For Essential hypertension    alogliptin 25 mg oral tablet  -- 1 tab(s) by mouth once a day  -- Indication: For Type 2 diabetes mellitus    glipiZIDE 2.5 mg oral tablet, extended release  -- 1 tab(s) by mouth once a day, ONLY TAKE if smaller than size of tic tac  -- Indication: For Type 2 diabetes mellitus    metFORMIN 500 mg oral tablet  -- 1 tab(s) by mouth 2 times a day, crush and put in low fat yogurt or pudding.   -- Indication: For Type 2 diabetes mellitus    ondansetron 4 mg oral tablet, disintegrating  -- 1 tab(s) by mouth every 8 hours, As Needed -  for nausea  -- Indication: For S/P laparoscopic sleeve gastrectomy    atorvastatin 10 mg oral tablet  -- 1 tab(s) by mouth once a day, if larger than size of tic tac, crush and put in low fat yogurt or pudding.   -- Indication: For hyperlipidemia    Breo Ellipta 200 mcg-25 mcg/inh inhalation powder  -- 1 puff(s) inhaled once a day  -- Indication: For Asthma    Ventolin HFA 90 mcg/inh inhalation aerosol  -- 2 puff(s) inhaled 4 times a day, As Needed  -- Indication: For Asthma    omeprazole 20 mg oral delayed release capsule  -- 1 cap(s) by mouth once a day, open and put contents in low fat yogurt or pudding   -- Indication: For S/P laparoscopic sleeve gastrectomy

## 2018-07-31 NOTE — DISCHARGE NOTE ADULT - PATIENT PORTAL LINK FT
You can access the [facility], offered by [facility], by registering with the following website: [email or web address]

## 2018-07-31 NOTE — PROGRESS NOTE ADULT - PROBLEM/PLAN-5
Pt missed labs and cancelled apts in both November and January with Dr. Reyes and for ultrasound.      Letter sent - re labs, clinic and u/s overdue.  
DISPLAY PLAN FREE TEXT

## 2018-07-31 NOTE — DISCHARGE NOTE ADULT - NS AS ACTIVITY OBS
Walking-Indoors allowed/Showering allowed/Stairs allowed/No Heavy lifting/straining/Do not make important decisions/Do not drive or operate machinery

## 2018-07-31 NOTE — DISCHARGE NOTE ADULT - ADDITIONAL INSTRUCTIONS
If glipizide ER is larger than size of tic tac, do no not take and follow up with prescriber about alternative medication/formulation.

## 2018-07-31 NOTE — PROGRESS NOTE ADULT - PROBLEM SELECTOR PLAN 1
Cpap hs  FU pulmonary
Cont GI / DVT prophylaxis.   Cont  OOB / ambulation on floor / incentive spirometry.  Cont bariatric clear diet as tolerated  Dietician consult.   Discussed and reviewed home meds  and pain medication with patient . Discussed medication that requires crushing.  Plan to begin protein shakes at home.  Possibly discharged later today or tomorrow.

## 2018-07-31 NOTE — PROGRESS NOTE ADULT - ASSESSMENT
Pt stable for gastric sleeve.  Hx NADINE on cpap  Hx Mild asthma. Pt stable postop gastric sleeve.  Hx NADINE on cpap  Hx Mild asthma.

## 2018-07-31 NOTE — DISCHARGE NOTE ADULT - CARE PLAN
Principal Discharge DX:	Morbid obesity  Goal:	Restriction of dietary intake and return to normal BMI.  Assessment and plan of treatment:	Instructed to ambulate and use incentive spirometry frequently. Ice packs to abdominal wall and shoulders as needed for discomfort. Cont bariatric diet and follow nutritional guidelines as instructed. Pain meds as needed. Pt instructed  to follow up with Dr. Mireles in 1 week.  Secondary Diagnosis:	S/P laparoscopic sleeve gastrectomy  Goal:	Restriction of dietary intake and return to normal BMI.  Assessment and plan of treatment:	Instructed to ambulate and use incentive spirometry frequently. Ice packs to abdominal wall and shoulders as needed for discomfort. Cont bariatric diet and follow nutritional guidelines as instructed. Pain meds as needed. Pt instructed  to follow up with Dr. Mireles in 1 week.

## 2018-07-31 NOTE — PROGRESS NOTE ADULT - SUBJECTIVE AND OBJECTIVE BOX
PULMONARY/CRITICAL CARE      INTERVAL HPI/OVERNIGHT EVENTS:    47y MaleHPI: Doing well, ambulating. Less pain. Used cpap hs.        PAST MEDICAL & SURGICAL HISTORY:  Carpal tunnel syndrome, left  Asthma  Dyslipidemia  Morbid obesity  Type 2 diabetes mellitus  Essential hypertension  Sleep apnea  History of elbow surgery: right, 2018  H/O total knee replacement, left: 2016        ICU Vital Signs Last 24 Hrs  T(C): 36.7 (31 Jul 2018 07:19), Max: 37 (30 Jul 2018 19:00)  T(F): 98 (31 Jul 2018 07:19), Max: 98.6 (30 Jul 2018 19:00)  HR: 49 (31 Jul 2018 07:19) (48 - 64)  BP: 143/82 (31 Jul 2018 07:19) (115/67 - 155/87)  BP(mean): --  ABP: --  ABP(mean): --  RR: 16 (31 Jul 2018 07:19) (12 - 20)  SpO2: 98% (31 Jul 2018 07:19) (97% - 100%)    Qtts:     I&O's Summary    30 Jul 2018 07:01  -  31 Jul 2018 07:00  --------------------------------------------------------  IN: 5250 mL / OUT: 2300 mL / NET: 2950 mL            REVIEW OF SYSTEMS:    CONSTITUTIONAL: No fever, weight loss, or fatigue  EYES: No eye pain, visual disturbances, or discharge  ENMT:  No difficulty hearing, tinnitus, vertigo; No sinus or throat pain  NECK: No pain or stiffness  BREASTS: No pain, masses, or nipple discharge  RESPIRATORY: No cough, wheezing, chills or hemoptysis; No shortness of breath  CARDIOVASCULAR: No chest pain, palpitations, dizziness, or leg swelling  GASTROINTESTINAL: mild abdominal  pain. No nausea, vomiting, or hematemesis; No diarrhea or constipation. No melena or hematochezia.  GENITOURINARY: No dysuria, frequency, hematuria, or incontinence  NEUROLOGICAL: No headaches, memory loss, loss of strength, numbness, or tremors  SKIN: No itching, burning, rashes, or lesions   LYMPH NODES: No enlarged glands  ENDOCRINE: No heat or cold intolerance; No hair loss  MUSCULOSKELETAL: No joint pain or swelling; No muscle, back, or extremity pain, no calf tenderness  PSYCHIATRIC: No depression, anxiety, mood swings, or difficulty sleeping  HEME/LYMPH: No easy bruising, or bleeding gums  ALLERGY AND IMMUNOLOGIC: No hives or eczema      PHYSICAL EXAM:    GENERAL: NAD, well-groomed, well-developed, NAD  HEAD:  Atraumatic, Normocephalic  EYES: EOMI, PERRLA, conjunctiva and sclera clear  ENMT: No tonsillar erythema, exudates, or enlargement; Moist mucous membranes, Good dentition, No lesions  NECK: Supple, No JVD, Normal thyroid  NERVOUS SYSTEM:  Alert & Oriented X3, Good concentration; Motor Strength 5/5 B/L upper and lower extremities  CHEST/LUNG: Clear to percussion bilaterally; No rales, rhonchi, wheezing, or rubs  HEART: Regular rate and rhythm; No murmurs, rubs, or gallops  ABDOMEN: Soft, mildly tender, Nondistended; Bowel sounds decreased  EXTREMITIES:  2+ Peripheral Pulses, No clubbing, cyanosis, or edema  LYMPH: No lymphadenopathy noted  SKIN: No rashes or lesions        LABS:                        12.5   9.55  )-----------( 216      ( 31 Jul 2018 07:09 )             38.8     07-31    139  |  105  |  14  ----------------------------<  127<H>  4.6   |  28  |  0.96    Ca    8.9      31 Jul 2018 07:09            vanco through     RADIOLOGY & ADDITIONAL STUDIES:      CRITICAL CARE TIME SPENT:
pt awake and alert.   comfortable. + passing flatus  VSS    abd- soft, obese. non tender. bs            cv s1s2               chest air entry   labs reviewed    a/p - Post op day 1 EGD assisted lap sleeve gastrectomy   cont w/ acid suppression , anti-emetic prn    ambulation encouraged, inc spirometer.     Bariatric clears, to be advanced per surgeon.     to follow w/ gi prn
Pre-Op Dx: Morbid obesity  Procedure: Gastrectomy, sleeve, laparoscopic, with EDG  Surgeon: Leslie    HPI:   47 year old Male s/p laparoscopic sleeve gastrectomy with EDG POD # 1. Patient is ambulating on the floor and utilizing incentive spirometry. He is tolerating clear liquid diet and urinating well. Minimal incisional discomfort. Denies any nausea or vomiting. Pt seen and examined at the bedside.       VITALS:  Vital Signs Last 24 Hrs  T(C): 36.6 (31 Jul 2018 11:21), Max: 37 (30 Jul 2018 19:00)  T(F): 97.8 (31 Jul 2018 11:21), Max: 98.6 (30 Jul 2018 19:00)  HR: 50 (31 Jul 2018 11:21) (48 - 57)  BP: 122/74 (31 Jul 2018 11:21) (122/74 - 155/87)  BP(mean): --  RR: 18 (31 Jul 2018 11:21) (12 - 18)  SpO2: 98% (31 Jul 2018 11:21) (97% - 100%)    07-30 @ 07:01  -  07-31 @ 07:00  --------------------------------------------------------  IN: 5250 mL / OUT: 2300 mL / NET: 2950 mL    LABS:                        12.5   9.55  )-----------( 216      ( 31 Jul 2018 07:09 )             38.8     07-31    139  |  105  |  14  ----------------------------<  127<H>  4.6   |  28  |  0.96    Ca    8.9      31 Jul 2018 07:09        CAPILLARY BLOOD GLUCOSE  POCT Blood Glucose.: 115 mg/dL (31 Jul 2018 05:19)  POCT Blood Glucose.: 131 mg/dL (30 Jul 2018 23:23)  POCT Blood Glucose.: 131 mg/dL (30 Jul 2018 16:44)  POCT Blood Glucose.: 136 mg/dL (30 Jul 2018 12:41)      Physical Exam:  General: A/O x 3, NAD, resting comfortably in bed  Abdominal: soft, ND, nontender to palpation, incisions C/D/I  Extremities: no edema, no calf tenderness B/L

## 2018-07-31 NOTE — DISCHARGE NOTE ADULT - REASON FOR ADMISSION
"weight loss surgery- gastric band" 47 year old male with PMHx of morbid obesity admitted to [facility] for scheduled laparoscopic sleeve gastrectomy.

## 2018-07-31 NOTE — PROGRESS NOTE ADULT - ASSESSMENT
47 year old Male POD # 1 s/p laparoscopic sleeve gastrectomy with EDG is doing well and hemodynamically stable.

## 2018-08-01 ENCOUNTER — MESSAGE (OUTPATIENT)
Age: 48
End: 2018-08-01

## 2018-08-07 ENCOUNTER — APPOINTMENT (OUTPATIENT)
Dept: BARIATRICS | Facility: CLINIC | Age: 48
End: 2018-08-07
Payer: MEDICAID

## 2018-08-07 VITALS
DIASTOLIC BLOOD PRESSURE: 80 MMHG | OXYGEN SATURATION: 98 % | HEART RATE: 56 BPM | WEIGHT: 250 LBS | HEIGHT: 64 IN | BODY MASS INDEX: 42.68 KG/M2 | SYSTOLIC BLOOD PRESSURE: 130 MMHG

## 2018-08-07 PROCEDURE — 99024 POSTOP FOLLOW-UP VISIT: CPT

## 2018-08-07 RX ORDER — ONDANSETRON 4 MG/1
4 TABLET, ORALLY DISINTEGRATING ORAL 4 TIMES DAILY
Qty: 15 | Refills: 0 | Status: COMPLETED | COMMUNITY
Start: 2018-07-24 | End: 2018-08-07

## 2018-08-07 RX ORDER — OXYCODONE AND ACETAMINOPHEN 5; 325 MG/1; MG/1
5-325 TABLET ORAL EVERY 6 HOURS
Qty: 12 | Refills: 0 | Status: COMPLETED | COMMUNITY
Start: 2018-07-24 | End: 2018-08-07

## 2018-08-14 ENCOUNTER — APPOINTMENT (OUTPATIENT)
Dept: BARIATRICS/WEIGHT MGMT | Facility: CLINIC | Age: 48
End: 2018-08-14
Payer: SELF-PAY

## 2018-08-14 VITALS — HEIGHT: 64 IN | BODY MASS INDEX: 41.93 KG/M2 | WEIGHT: 245.59 LBS

## 2018-08-14 PROCEDURE — 97803 MED NUTRITION INDIV SUBSEQ: CPT

## 2018-08-23 ENCOUNTER — APPOINTMENT (OUTPATIENT)
Dept: PULMONOLOGY | Facility: CLINIC | Age: 48
End: 2018-08-23
Payer: MEDICAID

## 2018-08-23 ENCOUNTER — NON-APPOINTMENT (OUTPATIENT)
Age: 48
End: 2018-08-23

## 2018-08-23 VITALS
BODY MASS INDEX: 40.82 KG/M2 | DIASTOLIC BLOOD PRESSURE: 80 MMHG | RESPIRATION RATE: 17 BRPM | HEIGHT: 65 IN | WEIGHT: 245 LBS | HEART RATE: 59 BPM | OXYGEN SATURATION: 98 % | SYSTOLIC BLOOD PRESSURE: 130 MMHG

## 2018-08-23 PROBLEM — E66.01 MORBID (SEVERE) OBESITY DUE TO EXCESS CALORIES: Chronic | Status: ACTIVE | Noted: 2018-02-26

## 2018-08-23 PROBLEM — E11.9 TYPE 2 DIABETES MELLITUS WITHOUT COMPLICATIONS: Chronic | Status: ACTIVE | Noted: 2018-02-26

## 2018-08-23 PROBLEM — I10 ESSENTIAL (PRIMARY) HYPERTENSION: Chronic | Status: ACTIVE | Noted: 2018-02-26

## 2018-08-23 PROBLEM — E78.5 HYPERLIPIDEMIA, UNSPECIFIED: Chronic | Status: ACTIVE | Noted: 2018-04-11

## 2018-08-23 PROBLEM — G56.02 CARPAL TUNNEL SYNDROME, LEFT UPPER LIMB: Chronic | Status: ACTIVE | Noted: 2018-04-11

## 2018-08-23 PROBLEM — J45.909 UNSPECIFIED ASTHMA, UNCOMPLICATED: Chronic | Status: ACTIVE | Noted: 2018-04-11

## 2018-08-23 PROCEDURE — 94010 BREATHING CAPACITY TEST: CPT

## 2018-08-23 PROCEDURE — 99214 OFFICE O/P EST MOD 30 MIN: CPT | Mod: 25

## 2018-08-30 ENCOUNTER — APPOINTMENT (OUTPATIENT)
Dept: BARIATRICS | Facility: CLINIC | Age: 48
End: 2018-08-30
Payer: MEDICAID

## 2018-08-30 VITALS
DIASTOLIC BLOOD PRESSURE: 84 MMHG | SYSTOLIC BLOOD PRESSURE: 132 MMHG | HEIGHT: 65 IN | OXYGEN SATURATION: 98 % | BODY MASS INDEX: 39.89 KG/M2 | HEART RATE: 62 BPM | WEIGHT: 239.42 LBS

## 2018-08-30 PROCEDURE — 99214 OFFICE O/P EST MOD 30 MIN: CPT | Mod: 24

## 2018-08-30 RX ORDER — GLIPIZIDE 5 MG/1
5 TABLET ORAL DAILY
Qty: 90 | Refills: 2 | Status: COMPLETED | COMMUNITY
End: 2018-08-30

## 2018-09-25 ENCOUNTER — APPOINTMENT (OUTPATIENT)
Dept: ORTHOPEDIC SURGERY | Facility: CLINIC | Age: 48
End: 2018-09-25
Payer: MEDICAID

## 2018-09-25 VITALS
DIASTOLIC BLOOD PRESSURE: 82 MMHG | HEART RATE: 73 BPM | WEIGHT: 239 LBS | SYSTOLIC BLOOD PRESSURE: 135 MMHG | BODY MASS INDEX: 39.82 KG/M2 | HEIGHT: 65 IN

## 2018-09-25 PROCEDURE — 20550 NJX 1 TENDON SHEATH/LIGAMENT: CPT | Mod: 59,LT

## 2018-09-25 PROCEDURE — 20610 DRAIN/INJ JOINT/BURSA W/O US: CPT | Mod: LT

## 2018-09-25 PROCEDURE — 73030 X-RAY EXAM OF SHOULDER: CPT | Mod: LT

## 2018-09-25 PROCEDURE — 99214 OFFICE O/P EST MOD 30 MIN: CPT | Mod: 25

## 2018-10-23 ENCOUNTER — OUTPATIENT (OUTPATIENT)
Dept: OUTPATIENT SERVICES | Facility: HOSPITAL | Age: 48
LOS: 1 days | End: 2018-10-23
Payer: MEDICAID

## 2018-10-23 DIAGNOSIS — Z98.890 OTHER SPECIFIED POSTPROCEDURAL STATES: Chronic | ICD-10-CM

## 2018-10-23 DIAGNOSIS — Z96.652 PRESENCE OF LEFT ARTIFICIAL KNEE JOINT: Chronic | ICD-10-CM

## 2018-10-23 DIAGNOSIS — Z98.84 BARIATRIC SURGERY STATUS: ICD-10-CM

## 2018-10-23 LAB
24R-OH-CALCIDIOL SERPL-MCNC: 45.4 NG/ML — SIGNIFICANT CHANGE UP (ref 30–80)
ALBUMIN SERPL ELPH-MCNC: 3.3 G/DL — SIGNIFICANT CHANGE UP (ref 3.3–5)
ALP SERPL-CCNC: 98 U/L — SIGNIFICANT CHANGE UP (ref 30–120)
ALT FLD-CCNC: 30 U/L DA — SIGNIFICANT CHANGE UP (ref 10–60)
ANION GAP SERPL CALC-SCNC: 6 MMOL/L — SIGNIFICANT CHANGE UP (ref 5–17)
AST SERPL-CCNC: 22 U/L — SIGNIFICANT CHANGE UP (ref 10–40)
BASOPHILS # BLD AUTO: 0.03 K/UL — SIGNIFICANT CHANGE UP (ref 0–0.2)
BASOPHILS NFR BLD AUTO: 0.5 % — SIGNIFICANT CHANGE UP (ref 0–2)
BILIRUB SERPL-MCNC: 0.8 MG/DL — SIGNIFICANT CHANGE UP (ref 0.2–1.2)
BUN SERPL-MCNC: 11 MG/DL — SIGNIFICANT CHANGE UP (ref 7–23)
CALCIUM SERPL-MCNC: 9.6 MG/DL — SIGNIFICANT CHANGE UP (ref 8.4–10.5)
CHLORIDE SERPL-SCNC: 105 MMOL/L — SIGNIFICANT CHANGE UP (ref 96–108)
CHOLEST SERPL-MCNC: 161 MG/DL — SIGNIFICANT CHANGE UP (ref 10–199)
CO2 SERPL-SCNC: 28 MMOL/L — SIGNIFICANT CHANGE UP (ref 22–31)
CREAT SERPL-MCNC: 0.92 MG/DL — SIGNIFICANT CHANGE UP (ref 0.5–1.3)
EOSINOPHIL # BLD AUTO: 0.29 K/UL — SIGNIFICANT CHANGE UP (ref 0–0.5)
EOSINOPHIL NFR BLD AUTO: 5.1 % — SIGNIFICANT CHANGE UP (ref 0–6)
FERRITIN SERPL-MCNC: 162 NG/ML — SIGNIFICANT CHANGE UP (ref 30–400)
FOLATE SERPL-MCNC: >20 NG/ML — SIGNIFICANT CHANGE UP
GLUCOSE SERPL-MCNC: 87 MG/DL — SIGNIFICANT CHANGE UP (ref 70–99)
HBA1C BLD-MCNC: 5.5 % — SIGNIFICANT CHANGE UP (ref 4–5.6)
HCT VFR BLD CALC: 44 % — SIGNIFICANT CHANGE UP (ref 39–50)
HDLC SERPL-MCNC: 53 MG/DL — SIGNIFICANT CHANGE UP
HGB BLD-MCNC: 14.1 G/DL — SIGNIFICANT CHANGE UP (ref 13–17)
IMM GRANULOCYTES NFR BLD AUTO: 0.2 % — SIGNIFICANT CHANGE UP (ref 0–1.5)
IRON SATN MFR SERPL: 26 % — SIGNIFICANT CHANGE UP (ref 16–55)
IRON SATN MFR SERPL: 71 UG/DL — SIGNIFICANT CHANGE UP (ref 45–165)
LIPID PNL WITH DIRECT LDL SERPL: 94 MG/DL — SIGNIFICANT CHANGE UP
LYMPHOCYTES # BLD AUTO: 1.6 K/UL — SIGNIFICANT CHANGE UP (ref 1–3.3)
LYMPHOCYTES # BLD AUTO: 28.1 % — SIGNIFICANT CHANGE UP (ref 13–44)
MCHC RBC-ENTMCNC: 25.8 PG — LOW (ref 27–34)
MCHC RBC-ENTMCNC: 32 GM/DL — SIGNIFICANT CHANGE UP (ref 32–36)
MCV RBC AUTO: 80.4 FL — SIGNIFICANT CHANGE UP (ref 80–100)
MONOCYTES # BLD AUTO: 0.45 K/UL — SIGNIFICANT CHANGE UP (ref 0–0.9)
MONOCYTES NFR BLD AUTO: 7.9 % — SIGNIFICANT CHANGE UP (ref 2–14)
NEUTROPHILS # BLD AUTO: 3.32 K/UL — SIGNIFICANT CHANGE UP (ref 1.8–7.4)
NEUTROPHILS NFR BLD AUTO: 58.2 % — SIGNIFICANT CHANGE UP (ref 43–77)
NRBC # BLD: 0 /100 WBCS — SIGNIFICANT CHANGE UP (ref 0–0)
PLATELET # BLD AUTO: 199 K/UL — SIGNIFICANT CHANGE UP (ref 150–400)
POTASSIUM SERPL-MCNC: 4 MMOL/L — SIGNIFICANT CHANGE UP (ref 3.5–5.3)
POTASSIUM SERPL-SCNC: 4 MMOL/L — SIGNIFICANT CHANGE UP (ref 3.5–5.3)
PROT SERPL-MCNC: 6.8 G/DL — SIGNIFICANT CHANGE UP (ref 6–8.3)
RBC # BLD: 5.47 M/UL — SIGNIFICANT CHANGE UP (ref 4.2–5.8)
RBC # FLD: 18.1 % — HIGH (ref 10.3–14.5)
SODIUM SERPL-SCNC: 139 MMOL/L — SIGNIFICANT CHANGE UP (ref 135–145)
TIBC SERPL-MCNC: 278 UG/DL — SIGNIFICANT CHANGE UP (ref 220–430)
TOTAL CHOLESTEROL/HDL RATIO MEASUREMENT: 3 RATIO — LOW (ref 3.4–9.6)
TRIGL SERPL-MCNC: 70 MG/DL — SIGNIFICANT CHANGE UP (ref 10–149)
TSH SERPL-MCNC: 1.92 UIU/ML — SIGNIFICANT CHANGE UP (ref 0.27–4.2)
UIBC SERPL-MCNC: 207 UG/DL — SIGNIFICANT CHANGE UP (ref 110–370)
VIT B12 SERPL-MCNC: 991 PG/ML — SIGNIFICANT CHANGE UP (ref 232–1245)
WBC # BLD: 5.7 K/UL — SIGNIFICANT CHANGE UP (ref 3.8–10.5)
WBC # FLD AUTO: 5.7 K/UL — SIGNIFICANT CHANGE UP (ref 3.8–10.5)

## 2018-10-23 PROCEDURE — 82728 ASSAY OF FERRITIN: CPT

## 2018-10-23 PROCEDURE — 82607 VITAMIN B-12: CPT

## 2018-10-23 PROCEDURE — 80061 LIPID PANEL: CPT

## 2018-10-23 PROCEDURE — 83036 HEMOGLOBIN GLYCOSYLATED A1C: CPT

## 2018-10-23 PROCEDURE — 84590 ASSAY OF VITAMIN A: CPT

## 2018-10-23 PROCEDURE — 85027 COMPLETE CBC AUTOMATED: CPT

## 2018-10-23 PROCEDURE — 80053 COMPREHEN METABOLIC PANEL: CPT

## 2018-10-23 PROCEDURE — 84425 ASSAY OF VITAMIN B-1: CPT

## 2018-10-23 PROCEDURE — 84252 ASSAY OF VITAMIN B-2: CPT

## 2018-10-23 PROCEDURE — 36415 COLL VENOUS BLD VENIPUNCTURE: CPT

## 2018-10-23 PROCEDURE — 84207 ASSAY OF VITAMIN B-6: CPT

## 2018-10-23 PROCEDURE — 83550 IRON BINDING TEST: CPT

## 2018-10-23 PROCEDURE — 82746 ASSAY OF FOLIC ACID SERUM: CPT

## 2018-10-23 PROCEDURE — 84443 ASSAY THYROID STIM HORMONE: CPT

## 2018-10-23 PROCEDURE — 82306 VITAMIN D 25 HYDROXY: CPT

## 2018-10-26 LAB
PYRIDOXAL PHOS SERPL-MCNC: 29.4 UG/L — SIGNIFICANT CHANGE UP (ref 5.3–46.7)
VIT B1 SERPL-MCNC: 109.2 NMOL/L — SIGNIFICANT CHANGE UP (ref 66.5–200)

## 2018-10-27 LAB — VIT A SERPL-MCNC: 37.3 UG/DL — SIGNIFICANT CHANGE UP (ref 33.1–100)

## 2018-10-28 LAB — VIT B2 SERPL-MCNC: 290 UG/L — SIGNIFICANT CHANGE UP (ref 137–370)

## 2018-10-30 ENCOUNTER — APPOINTMENT (OUTPATIENT)
Dept: BARIATRICS | Facility: CLINIC | Age: 48
End: 2018-10-30
Payer: MEDICAID

## 2018-10-30 VITALS
HEART RATE: 74 BPM | WEIGHT: 216 LBS | SYSTOLIC BLOOD PRESSURE: 130 MMHG | HEIGHT: 65 IN | BODY MASS INDEX: 35.99 KG/M2 | DIASTOLIC BLOOD PRESSURE: 82 MMHG | OXYGEN SATURATION: 97 %

## 2018-10-30 PROCEDURE — 99214 OFFICE O/P EST MOD 30 MIN: CPT

## 2018-11-19 ENCOUNTER — APPOINTMENT (OUTPATIENT)
Dept: ORTHOPEDIC SURGERY | Facility: CLINIC | Age: 48
End: 2018-11-19
Payer: MEDICAID

## 2018-11-19 VITALS
SYSTOLIC BLOOD PRESSURE: 160 MMHG | HEART RATE: 64 BPM | HEIGHT: 65 IN | DIASTOLIC BLOOD PRESSURE: 101 MMHG | BODY MASS INDEX: 35.49 KG/M2 | WEIGHT: 213 LBS

## 2018-11-19 PROCEDURE — 99214 OFFICE O/P EST MOD 30 MIN: CPT

## 2018-11-29 ENCOUNTER — OUTPATIENT (OUTPATIENT)
Dept: OUTPATIENT SERVICES | Facility: HOSPITAL | Age: 48
LOS: 1 days | End: 2018-11-29
Payer: MEDICAID

## 2018-11-29 ENCOUNTER — APPOINTMENT (OUTPATIENT)
Dept: MRI IMAGING | Facility: CLINIC | Age: 48
End: 2018-11-29

## 2018-11-29 DIAGNOSIS — Z00.8 ENCOUNTER FOR OTHER GENERAL EXAMINATION: ICD-10-CM

## 2018-11-29 DIAGNOSIS — Z98.890 OTHER SPECIFIED POSTPROCEDURAL STATES: Chronic | ICD-10-CM

## 2018-11-29 DIAGNOSIS — Z96.652 PRESENCE OF LEFT ARTIFICIAL KNEE JOINT: Chronic | ICD-10-CM

## 2018-11-29 PROCEDURE — 73221 MRI JOINT UPR EXTREM W/O DYE: CPT

## 2018-11-29 PROCEDURE — 73221 MRI JOINT UPR EXTREM W/O DYE: CPT | Mod: 26,LT

## 2018-12-05 ENCOUNTER — APPOINTMENT (OUTPATIENT)
Dept: ORTHOPEDIC SURGERY | Facility: CLINIC | Age: 48
End: 2018-12-05
Payer: MEDICAID

## 2018-12-05 VITALS
DIASTOLIC BLOOD PRESSURE: 89 MMHG | SYSTOLIC BLOOD PRESSURE: 146 MMHG | HEART RATE: 61 BPM | HEIGHT: 65 IN | BODY MASS INDEX: 35.49 KG/M2 | WEIGHT: 213 LBS

## 2018-12-05 PROCEDURE — 99213 OFFICE O/P EST LOW 20 MIN: CPT

## 2018-12-27 ENCOUNTER — NON-APPOINTMENT (OUTPATIENT)
Age: 48
End: 2018-12-27

## 2018-12-27 ENCOUNTER — APPOINTMENT (OUTPATIENT)
Dept: PULMONOLOGY | Facility: CLINIC | Age: 48
End: 2018-12-27
Payer: MEDICAID

## 2018-12-27 VITALS
SYSTOLIC BLOOD PRESSURE: 140 MMHG | HEIGHT: 65 IN | BODY MASS INDEX: 35.49 KG/M2 | HEART RATE: 53 BPM | WEIGHT: 213 LBS | DIASTOLIC BLOOD PRESSURE: 80 MMHG | OXYGEN SATURATION: 98 % | RESPIRATION RATE: 17 BRPM

## 2018-12-27 DIAGNOSIS — Z23 ENCOUNTER FOR IMMUNIZATION: ICD-10-CM

## 2018-12-27 PROCEDURE — 94010 BREATHING CAPACITY TEST: CPT

## 2018-12-27 PROCEDURE — 90686 IIV4 VACC NO PRSV 0.5 ML IM: CPT

## 2018-12-27 PROCEDURE — G0008: CPT

## 2018-12-27 PROCEDURE — 99214 OFFICE O/P EST MOD 30 MIN: CPT | Mod: 25

## 2018-12-27 RX ORDER — ACETAMINOPHEN AND CODEINE 300; 30 MG/1; MG/1
300-30 TABLET ORAL
Qty: 60 | Refills: 0 | Status: DISCONTINUED | COMMUNITY
Start: 2018-11-19 | End: 2018-12-27

## 2018-12-27 RX ORDER — ACETAMINOPHEN AND CODEINE 300; 30 MG/1; MG/1
300-30 TABLET ORAL
Qty: 60 | Refills: 0 | Status: DISCONTINUED | COMMUNITY
Start: 2018-09-25 | End: 2018-12-27

## 2018-12-27 NOTE — REASON FOR VISIT
[Follow-Up] : a follow-up visit [FreeTextEntry1] : s/p bariatric surgery-asthma, chronic cough, GERD, NADINE, postnasal drip, sleep-disordered breathing, snoring, low vitamin D and shortness of breath

## 2018-12-27 NOTE — ASSESSMENT
[FreeTextEntry1] : Mr. Villegas is a 46 year old male with a history of HTN, recent diabetes, obesity, who now comes in for a re-evaluation.  He is currently s/p bariatric surgery as a mode of weight loss, now with severe OSAS which have improved\par \par His shortness of breath (improved) is felt to be related to:\par -overweight/out of shape\par -poor breathing mechanics\par -asthma\par -cardiac at risk \par \par History of a chronic cough felt to be related to:\par -asthma\par -post nasal drip syndrome (active)\par -secondary GERD \par \par problem 1: asthma\par -MCT reveals positivity for asthma\par -continue Breo Ellipta 200 1 inhalation QD\par -continue Ventolin as a rescue inhaler 2 inhalations up to QiD \par -Inhaler technique reviewed as well as oral hygiene techniques reviewed with patient. Avoidance of cold air, extremes of temperature, rescue inhaler should be used before exercise. Order of medication reviewed with patient. Recommended use of a cool mist humidifier in the bedroom. Instructed to gargle and spit after inhaler use. \par -Asthma is  believed to be caused by inherited (genetic) and environmental factor, but its exact cause is unknown. Asthma may be triggered by allergens, lung infections, or irritants in the air. Asthma triggers are different for each person\par \par problem 2: post nasal drip syndrome/allergies\par -continue Qnasl 1 inhalation per nostril BID\par -continue Clarinex 5 mg QHS \par -continue Olopatadine 1 sniff/nostril BID\par -Environmental measures for allergies were encouraged including mattress and pillow cover, air purifier, and environmental controls.\par \par problem 3: r/o allergen profile\par -allergy blood work: food IgE level, asthma profile, eosinophil level, IgE level, and vitamin D level (noncompliant)\par \par problem 4: GERD\par -continue Zantac 300 mg QHS\par -Rule of 2s: avoid eating too much, eating too late, eating too spicy, eating two hours before bed\par -Things to avoid including overeating, spicy foods, tight clothing, eating within three hours of bed, this list is not all inclusive. \par -For treatment of reflux, possible options discussed including diet control, H2 blockers, PPIs, as well as coating motility agents discussed as treatment options. Timing of meals and proximity of last meal to sleep were discussed. If symptoms persist, a formal gastrointestinal evaluation is needed.\par \par problem 5: positive NADINE (severe)\par -secondary sleep study reveals CPAP of 14 [id number] up study to be complete for re-evaluation after continued weight loss\par -reassess for dental device when at his target weight\par -Sleep apnea is associated with adverse clinical consequences which an affect most organ systems.  Cardiovascular disease risk includes arrhythmias, atrial fibrillation, hypertension, coronary artery disease, and stroke. Metabolic disorders include diabetes type 2, non-alcoholic fatty liver disease. Mood disorder especially depression; and cognitive decline especially in the elderly. Associations with  chronic reflux/Anguiano’s esophagus some but not all inclusive. \par -Reasons to assess this include arousal consistent with hypopnea; respiratory events most prominent in REM sleep or supine position; therefore sleep staging and body position are important for accurate diagnosis and estimation of AHI.\par -Treatment options discussed including CPAP/BiPAP machine, oral appliance, ProVent therapy, Oxy-Aid by Respite[employer], new technologies, or positional sleep.Recommended use of the CPAP machine for moderate (AHI >15), moderate to severe (AHI 15-30) and severe patients (AHI > 30). Recommended weight loss which can reduce AHI especially in weight loss of greater than 5% of BMI. Positional sleep is recommended in those with low AHI, low-moderate BMI, and younger age. For severe sleep apnea, the hypoglossal nerve stimulator was recommended as well. \par \par problem 6: obesity/out of shape\par -recommended to read "Bright Spots and Land Mines" , Engine 2" and "Obesity Code" \par -Weight loss, exercise, and diet control were discussed and are highly encouraged. Treatment options were given such as, aqua therapy, and contacting a nutritionist. Recommended to use the elliptical, stationary bike, less use of treadmill.  Obesity is associated with worsening asthma, shortness of breath, and potential for cardiac disease, diabetes, and other underlying medical conditions.\par \par problem 7: poor breathing mechanics\par -Proper breathing techniques were reviewed with an emphasis of exhalation. Patient instructed to breath in for 1 second and out for four seconds. Patient was encouraged to not talk while walking. \par \par problem 8: health maintenance \par -received  2018 flu shot today\par -recommended strep pneumonia vaccines: Prevnar-13 vaccine, followed by Pneumo vaccine 23 one year following\par -recommended early intervention for URIs\par -recommended regular osteoporosis evaluations\par -recommended early dermatological evaluations\par -recommended after the age of 50 to the age of 70, colonoscopy every 5 years \par \par Follow up in 4 months with full spirometry and NiOx testing\par He is encouraged to call with any changes, concerns, or questions.

## 2018-12-27 NOTE — ADDENDUM
[FreeTextEntry1] : Documented by Migel Medley acting as a scribe for Dr. Rufus Min on 12/27/18.\par \par All medical record entries made by the Scribe were at my, Dr. Rufus Min's, direction and personally dictated by me on 12/27/18. I have reviewed the chart and agree that the record accurately reflects my personal performance of the history, physical exam, procedure, assessment and plan. I have also personally directed, reviewed, and agree with the discharge instructions. \par \par

## 2018-12-27 NOTE — HISTORY OF PRESENT ILLNESS
[FreeTextEntry1] : Mr. Villegas is a 48 year old male presenting to the office for a follow up visit for asthma, chronic cough, GERD, NADINE, postnasal drip, sleep-disordered breathing, snoring, low vitamin D and shortness of breath. He is presenting today s/p bariatric surgery. His chief complaint is his weight\par -he reports he currently is exercising at the gym\par -he notes he also stays active with 30 minutes of walking everyday\par -he states he has lost weight recently but wishes to lose more weight\par -he reports his blood sugar is under control\par -he states he is sleeping well and using CPAP regularly \par -he reports he occasionally has rhinorrhea due to the cold weather\par -he denies any coughing, wheezing, sour taste in the mouth, heartburn, reflux, itchy eyes, itchy ears\par

## 2018-12-27 NOTE — PROCEDURE
[FreeTextEntry1] : PFT revealed normal flows, with a FEV1 of  2.94 ,which is 89% of predicted, with a normal flow volume loop\par

## 2019-01-07 ENCOUNTER — APPOINTMENT (OUTPATIENT)
Dept: CARDIOLOGY | Facility: CLINIC | Age: 49
End: 2019-01-07

## 2019-02-01 ENCOUNTER — APPOINTMENT (OUTPATIENT)
Dept: BARIATRICS | Facility: CLINIC | Age: 49
End: 2019-02-01
Payer: MEDICAID

## 2019-02-01 VITALS
DIASTOLIC BLOOD PRESSURE: 80 MMHG | SYSTOLIC BLOOD PRESSURE: 118 MMHG | BODY MASS INDEX: 35.74 KG/M2 | WEIGHT: 214.51 LBS | OXYGEN SATURATION: 98 % | HEIGHT: 65 IN | HEART RATE: 54 BPM

## 2019-02-01 DIAGNOSIS — Z87.898 PERSONAL HISTORY OF OTHER SPECIFIED CONDITIONS: ICD-10-CM

## 2019-02-01 DIAGNOSIS — Z01.818 ENCOUNTER FOR OTHER PREPROCEDURAL EXAMINATION: ICD-10-CM

## 2019-02-01 DIAGNOSIS — M70.62 TROCHANTERIC BURSITIS, LEFT HIP: ICD-10-CM

## 2019-02-01 DIAGNOSIS — Z87.828 PERSONAL HISTORY OF OTHER (HEALED) PHYSICAL INJURY AND TRAUMA: ICD-10-CM

## 2019-02-01 DIAGNOSIS — Z01.810 ENCOUNTER FOR PREPROCEDURAL CARDIOVASCULAR EXAMINATION: ICD-10-CM

## 2019-02-01 DIAGNOSIS — S46.219A STRAIN OF MUSCLE, FASCIA AND TENDON OF OTHER PARTS OF BICEPS, UNSPECIFIED ARM, INITIAL ENCOUNTER: ICD-10-CM

## 2019-02-01 DIAGNOSIS — E66.01 MORBID (SEVERE) OBESITY DUE TO EXCESS CALORIES: ICD-10-CM

## 2019-02-01 DIAGNOSIS — K21.9 GASTRO-ESOPHAGEAL REFLUX DISEASE W/OUT ESOPHAGITIS: ICD-10-CM

## 2019-02-01 DIAGNOSIS — Z87.09 PERSONAL HISTORY OF OTHER DISEASES OF THE RESPIRATORY SYSTEM: ICD-10-CM

## 2019-02-01 DIAGNOSIS — Z86.19 PERSONAL HISTORY OF OTHER INFECTIOUS AND PARASITIC DISEASES: ICD-10-CM

## 2019-02-01 DIAGNOSIS — Z86.79 PERSONAL HISTORY OF OTHER DISEASES OF THE CIRCULATORY SYSTEM: ICD-10-CM

## 2019-02-01 DIAGNOSIS — S46.012A STRAIN OF MUSCLE(S) AND TENDON(S) OF THE ROTATOR CUFF OF LEFT SHOULDER, INITIAL ENCOUNTER: ICD-10-CM

## 2019-02-01 PROCEDURE — 99214 OFFICE O/P EST MOD 30 MIN: CPT

## 2019-02-01 RX ORDER — LOSARTAN POTASSIUM 100 MG/1
100 TABLET, FILM COATED ORAL
Qty: 30 | Refills: 0 | Status: DISCONTINUED | COMMUNITY
Start: 2018-07-31 | End: 2019-02-01

## 2019-02-01 RX ORDER — ATORVASTATIN CALCIUM 10 MG/1
10 TABLET, FILM COATED ORAL DAILY
Qty: 90 | Refills: 3 | Status: DISCONTINUED | COMMUNITY
End: 2019-02-01

## 2019-02-01 RX ORDER — CELECOXIB 200 MG/1
200 CAPSULE ORAL DAILY
Qty: 30 | Refills: 1 | Status: DISCONTINUED | COMMUNITY
Start: 2018-11-19 | End: 2019-02-01

## 2019-02-01 RX ORDER — OMEPRAZOLE 20 MG/1
20 CAPSULE, DELAYED RELEASE ORAL DAILY
Qty: 30 | Refills: 2 | Status: DISCONTINUED | COMMUNITY
Start: 2018-07-24 | End: 2019-02-01

## 2019-02-01 RX ORDER — ALOGLIPTIN 25 MG/1
25 TABLET, FILM COATED ORAL DAILY
Refills: 0 | Status: DISCONTINUED | COMMUNITY
End: 2019-02-01

## 2019-02-01 RX ORDER — ERTUGLIFLOZIN 5 MG/1
5 TABLET, FILM COATED ORAL
Qty: 30 | Refills: 0 | Status: DISCONTINUED | COMMUNITY
Start: 2018-08-20 | End: 2019-02-01

## 2019-02-01 RX ORDER — GLIPIZIDE 5 MG/1
5 TABLET, FILM COATED, EXTENDED RELEASE ORAL
Qty: 30 | Refills: 0 | Status: DISCONTINUED | COMMUNITY
Start: 2018-06-19 | End: 2019-02-01

## 2019-02-01 RX ORDER — LOSARTAN POTASSIUM AND HYDROCHLOROTHIAZIDE 25; 100 MG/1; MG/1
100-25 TABLET ORAL
Qty: 1 | Refills: 0 | Status: DISCONTINUED | COMMUNITY
Start: 2017-11-21 | End: 2019-02-01

## 2019-02-07 NOTE — HISTORY OF PRESENT ILLNESS
[Procedure: ___] : Procedure performed: [unfilled]  [Date of Surgery: ___] : Date of Surgery:   [unfilled] [Surgeon Name:   ___] : Surgeon Name: Dr. HERNANDEZ [Pre-Op Weight ___] : Pre-op weight was [unfilled] lbs [de-identified] : 48 year old male six months s/p laparoscopic sleeve gastrectomy presents for follow up visit. He lost 2 lbs since last visit. He is consuming the appropriate amount of protein, but not adequate water daily.  He states he snacks on pretzels and chips. He is taking vitamins as directed.  He denies acid reflux symptoms, nausea, vomiting, diarrhea and constipation. For exercise, he does cardio and strength exercises 2-3 times a week and walks about 1.5 miles twice a week. He was taken off medications for hypertension, hyperlipidemia and diabetes. He remains of Metformin for diabetes. \par

## 2019-02-07 NOTE — ASSESSMENT
[FreeTextEntry1] : 48 year old male 6 months s/p laparoscopic sleeve gastrectomy presents for follow up visit. He is doing well and weight loss plateaued, which might be attributed to increased snacking on simple carbs. The patient was encouraged to have a low fat, low carbohydrate and high protein diet consisting of three meals and no sncaking on simple carbs. He was also directed to increase water intake. Patient was advised to continue current exercise routine. \par \par Nutrition and exercise counseling provided.\par Continue vitamins\par Food journal and nutritionist appointment in 6 weeks\par Follow up in 3 months with LABS\par Call with any questions or concerns\par

## 2019-02-07 NOTE — PHYSICAL EXAM
[Obese, well nourished, in no acute distress] : obese, well nourished, in no acute distress [Normal] : affect appropriate [de-identified] : EOMI, anicteric  [de-identified] : soft and nontender, no hernias appreciated, incisions well healed.

## 2019-02-07 NOTE — REVIEW OF SYSTEMS
[Recent Change In Weight] : ~T recent weight change [Joint Pain] : joint pain [Negative] : Psychiatric [Fever] : no fever [Chills] : no chills [Dysphagia] : no dysphagia [Hoarseness] : no hoarseness [Chest Pain] : no chest pain [Palpitations] : no palpitations [Lower Ext Edema] : no lower extremity edema [Shortness Of Breath] : no shortness of breath [Wheezing] : no wheezing [Cough] : no cough [Abdominal Pain] : no abdominal pain [Vomiting] : no vomiting [Constipation] : no constipation [Diarrhea] : no diarrhea [Reflux/Heartburn] : no reflex/heartburn [FreeTextEntry2] : weight loss

## 2019-04-01 NOTE — H&P PST ADULT - SOURCE OF INFORMATION, PROFILE
Asthma is worsening. The patient is experiencing frequent daytime asthma symptoms. She is experiencing frequent nighttime asthma symptoms. Discussed medication dosage, use, side effects, and goals of treatment in detail. Patient complains of frequent asthma exacerbations requiring rescue inhalers and nebulizer treatments. Denies fever, worsening of phlegm. Consistent cough, even during examination. Lungs are clear on exam without wheezing or crackles. ,    Patient has been noncompliant with Symbicort, montelukast.  Patient agrees to try taking medications consistently. CT Chest WO contrast ordered for chronic nature of cough and patient's discomfort. patient

## 2019-04-23 ENCOUNTER — APPOINTMENT (OUTPATIENT)
Dept: PULMONOLOGY | Facility: CLINIC | Age: 49
End: 2019-04-23
Payer: MEDICAID

## 2019-04-23 VITALS
RESPIRATION RATE: 15 BRPM | BODY MASS INDEX: 35.49 KG/M2 | OXYGEN SATURATION: 97 % | WEIGHT: 213 LBS | DIASTOLIC BLOOD PRESSURE: 70 MMHG | HEART RATE: 63 BPM | SYSTOLIC BLOOD PRESSURE: 126 MMHG | HEIGHT: 65 IN

## 2019-04-23 PROCEDURE — 99214 OFFICE O/P EST MOD 30 MIN: CPT

## 2019-04-23 NOTE — PROCEDURE
[FreeTextEntry1] : PFT revealed normal flows, with a FEV1 of  3.29 L ,which is 99% of predicted, with a normal flow volume loop\par

## 2019-04-23 NOTE — ADDENDUM
[FreeTextEntry1] : Documented by Evonne Preciado acting as a scribe for Dr. Rufus Min on 4/23/2019.\par \par All medical record entries made by the scribe, Evonne Preciado, were at my, Dr. Rufus Min's, direction and personally dictated by me on 4/23/2019. I have reviewed the chart and agree that the record accurately reflects my personal performance of the history, physical exam, assessment and plan. I have also personally directed, reviewed, and agree with the discharge instructions.

## 2019-04-23 NOTE — PHYSICAL EXAM
[General Appearance - Well Developed] : well developed [Normal Appearance] : normal appearance [Well Groomed] : well groomed [General Appearance - Well Nourished] : well nourished [General Appearance - In No Acute Distress] : no acute distress [No Deformities] : no deformities [Normal Conjunctiva] : the conjunctiva exhibited no abnormalities [Eyelids - No Xanthelasma] : the eyelids demonstrated no xanthelasmas [Normal Oropharynx] : normal oropharynx [III] : III [Neck Appearance] : the appearance of the neck was normal [Neck Cervical Mass (___cm)] : no neck mass was observed [Thyroid Diffuse Enlargement] : the thyroid was not enlarged [Jugular Venous Distention Increased] : there was no jugular-venous distention [Heart Rate And Rhythm] : heart rate and rhythm were normal [Thyroid Nodule] : there were no palpable thyroid nodules [Heart Sounds] : normal S1 and S2 [Murmurs] : no murmurs present [Respiration, Rhythm And Depth] : normal respiratory rhythm and effort [Exaggerated Use Of Accessory Muscles For Inspiration] : no accessory muscle use [Auscultation Breath Sounds / Voice Sounds] : lungs were clear to auscultation bilaterally [Abdomen Soft] : soft [Abdomen Tenderness] : non-tender [Abdomen Mass (___ Cm)] : no abdominal mass palpated [Abnormal Walk] : normal gait [Gait - Sufficient For Exercise Testing] : the gait was sufficient for exercise testing [Petechial Hemorrhages (___cm)] : no petechial hemorrhages [Nail Clubbing] : no clubbing of the fingernails [Cyanosis, Localized] : no localized cyanosis [Skin Color & Pigmentation] : normal skin color and pigmentation [Skin Lesions] : no skin lesions [No Venous Stasis] : no venous stasis [] : no rash [No Skin Ulcers] : no skin ulcer [No Xanthoma] : no  xanthoma was observed [Deep Tendon Reflexes (DTR)] : deep tendon reflexes were 2+ and symmetric [Sensation] : the sensory exam was normal to light touch and pinprick [No Focal Deficits] : no focal deficits [Impaired Insight] : insight and judgment were intact [Oriented To Time, Place, And Person] : oriented to person, place, and time [Affect] : the affect was normal [FreeTextEntry1] : I:E 1:3, clear

## 2019-04-23 NOTE — HISTORY OF PRESENT ILLNESS
[FreeTextEntry1] : Mr. Villegas is a 48 year old male presenting to the office for a follow up visit for asthma, chronic cough, GERD, NADINE, postnasal drip, sleep-disordered breathing, snoring, low vitamin D and shortness of breath. He is presenting today s/p bariatric surgery. His chief complaint is his weight\par -he notes he is currently unemployed and seeking actively employment \par -he reports he currently is exercising at the gym\par -he notes he also stays active with 30 minutes of walking everyday\par -he notes weight is stable. \par -he reports his blood sugar is under control\par -he states he is sleeping well however not using the CPAP. \par -he reports occasional diarrhea, triggered by chocolate. \par -he denies any headaches, nausea, vomiting, fever, chills, sweats, chest pain, chest pressure, diarrhea, constipation, dysphagia, dizziness, leg swelling, leg pain, itchy eyes, itchy ears, heartburn, reflux, or sour taste in the mouth.

## 2019-04-23 NOTE — ASSESSMENT
[FreeTextEntry1] : Mr. Villegas is a 46 year old male with a history of asthma, allergy, GERD, HTN, recent diabetes, obesity, who now comes in for a re-evaluation.  He is currently s/p bariatric surgery as a mode of weight loss, now with severe OSAS which have improved\par \par His shortness of breath (improved) is felt to be related to:\par -overweight/out of shape\par -poor breathing mechanics\par -asthma\par -cardiac at risk \par \par History of a chronic cough felt to be related to:\par -asthma\par -post nasal drip syndrome (active)\par -secondary GERD \par \par problem 1: asthma\par -MCT reveals positivity for asthma\par -continue Breo Ellipta 200 1 inhalation QD\par -continue Ventolin as a rescue inhaler 2 inhalations up to QiD \par -Inhaler technique reviewed as well as oral hygiene techniques reviewed with patient. Avoidance of cold air, extremes of temperature, rescue inhaler should be used before exercise. Order of medication reviewed with patient. Recommended use of a cool mist humidifier in the bedroom. Instructed to gargle and spit after inhaler use. \par -Asthma is  believed to be caused by inherited (genetic) and environmental factor, but its exact cause is unknown. Asthma may be triggered by allergens, lung infections, or irritants in the air. Asthma triggers are different for each person\par \par problem 2: post nasal drip syndrome/allergies\par -continue Qnasl 1 inhalation per nostril BID\par -continue Clarinex 5 mg QHS \par -continue Olopatadine (0.6) 1 sniff/nostril BID\par -Environmental measures for allergies were encouraged including mattress and pillow cover, air purifier, and environmental controls.\par \par problem 3: r/o allergen profile\par -allergy blood work: food IgE level, asthma profile, eosinophil level, IgE level, and vitamin D level (noncompliant)\par \par problem 4: GERD\par -continue Zantac 300 mg QHS\par -Rule of 2s: avoid eating too much, eating too late, eating too spicy, eating two hours before bed\par -Things to avoid including overeating, spicy foods, tight clothing, eating within three hours of bed, this list is not all inclusive. \par -For treatment of reflux, possible options discussed including diet control, H2 blockers, PPIs, as well as coating motility agents discussed as treatment options. Timing of meals and proximity of last meal to sleep were discussed. If symptoms persist, a formal gastrointestinal evaluation is needed.\par \par problem 5: positive NADINE (severe) (Non compliant) \par -Repeat Home sleep study. \par -secondary sleep study reveals CPAP of 14 [id number] up study to be complete for re-evaluation after continued weight loss\par -reassess for dental device when at his target weight\par -Sleep apnea is associated with adverse clinical consequences which an affect most organ systems.  Cardiovascular disease risk includes arrhythmias, atrial fibrillation, hypertension, coronary artery disease, and stroke. Metabolic disorders include diabetes type 2, non-alcoholic fatty liver disease. Mood disorder especially depression; and cognitive decline especially in the elderly. Associations with  chronic reflux/Anguiano’s esophagus some but not all inclusive. \par -Reasons to assess this include arousal consistent with hypopnea; respiratory events most prominent in REM sleep or supine position; therefore sleep staging and body position are important for accurate diagnosis and estimation of AHI.\par -Treatment options discussed including CPAP/BiPAP machine, oral appliance, ProVent therapy, Oxy-Aid by Respitec, new technologies, or positional sleep.Recommended use of the CPAP machine for moderate (AHI >15), moderate to severe (AHI 15-30) and severe patients (AHI > 30). Recommended weight loss which can reduce AHI especially in weight loss of greater than 5% of BMI. Positional sleep is recommended in those with low AHI, low-moderate BMI, and younger age. For severe sleep apnea, the hypoglossal nerve stimulator was recommended as well. \par \par problem 6: obesity/out of shape\par -recommended to read "Bright Spots and Land Mines" , Engine 2" and "Obesity Code" \par -Weight loss, exercise, and diet control were discussed and are highly encouraged. Treatment options were given such as, aqua therapy, and contacting a nutritionist. Recommended to use the elliptical, stationary bike, less use of treadmill.  Obesity is associated with worsening asthma, shortness of breath, and potential for cardiac disease, diabetes, and other underlying medical conditions.\par \par problem 7: poor breathing mechanics\par -Proper breathing techniques were reviewed with an emphasis of exhalation. Patient instructed to breath in for 1 second and out for four seconds. Patient was encouraged to not talk while walking. \par \par problem 8: health maintenance \par -received  2018 flu shot. \par -recommended strep pneumonia vaccines: Prevnar-13 vaccine, followed by Pneumo vaccine 23 one year following\par -recommended early intervention for URIs\par -recommended regular osteoporosis evaluations\par -recommended early dermatological evaluations\par -recommended after the age of 50 to the age of 70, colonoscopy every 5 years \par \par Follow up in 4 months with full spirometry and NiOx testing\par He is encouraged to call with any changes, concerns, or questions.

## 2019-05-03 ENCOUNTER — OUTPATIENT (OUTPATIENT)
Dept: OUTPATIENT SERVICES | Facility: HOSPITAL | Age: 49
LOS: 1 days | End: 2019-05-03
Payer: MEDICAID

## 2019-05-03 DIAGNOSIS — E55.9 VITAMIN D DEFICIENCY, UNSPECIFIED: ICD-10-CM

## 2019-05-03 DIAGNOSIS — E11.9 TYPE 2 DIABETES MELLITUS WITHOUT COMPLICATIONS: ICD-10-CM

## 2019-05-03 DIAGNOSIS — Z98.890 OTHER SPECIFIED POSTPROCEDURAL STATES: Chronic | ICD-10-CM

## 2019-05-03 DIAGNOSIS — Z96.652 PRESENCE OF LEFT ARTIFICIAL KNEE JOINT: Chronic | ICD-10-CM

## 2019-05-03 DIAGNOSIS — E66.9 OBESITY, UNSPECIFIED: ICD-10-CM

## 2019-05-03 DIAGNOSIS — Z98.84 BARIATRIC SURGERY STATUS: ICD-10-CM

## 2019-05-03 LAB
ALBUMIN SERPL ELPH-MCNC: 4 G/DL — SIGNIFICANT CHANGE UP (ref 3.3–5)
ALP SERPL-CCNC: 96 U/L — SIGNIFICANT CHANGE UP (ref 30–120)
ALT FLD-CCNC: 30 U/L DA — SIGNIFICANT CHANGE UP (ref 10–60)
ANION GAP SERPL CALC-SCNC: 3 MMOL/L — LOW (ref 5–17)
AST SERPL-CCNC: 22 U/L — SIGNIFICANT CHANGE UP (ref 10–40)
BASOPHILS # BLD AUTO: 0.05 K/UL — SIGNIFICANT CHANGE UP (ref 0–0.2)
BASOPHILS NFR BLD AUTO: 0.8 % — SIGNIFICANT CHANGE UP (ref 0–2)
BILIRUB SERPL-MCNC: 0.9 MG/DL — SIGNIFICANT CHANGE UP (ref 0.2–1.2)
BUN SERPL-MCNC: 18 MG/DL — SIGNIFICANT CHANGE UP (ref 7–23)
CALCIUM SERPL-MCNC: 9.2 MG/DL — SIGNIFICANT CHANGE UP (ref 8.4–10.5)
CHLORIDE SERPL-SCNC: 104 MMOL/L — SIGNIFICANT CHANGE UP (ref 96–108)
CHOLEST SERPL-MCNC: 186 MG/DL — SIGNIFICANT CHANGE UP (ref 10–199)
CO2 SERPL-SCNC: 33 MMOL/L — HIGH (ref 22–31)
CREAT SERPL-MCNC: 0.93 MG/DL — SIGNIFICANT CHANGE UP (ref 0.5–1.3)
EOSINOPHIL # BLD AUTO: 0.49 K/UL — SIGNIFICANT CHANGE UP (ref 0–0.5)
EOSINOPHIL NFR BLD AUTO: 7.8 % — HIGH (ref 0–6)
FERRITIN SERPL-MCNC: 162 NG/ML — SIGNIFICANT CHANGE UP (ref 30–400)
FOLATE SERPL-MCNC: >20 NG/ML — SIGNIFICANT CHANGE UP
GLUCOSE SERPL-MCNC: 89 MG/DL — SIGNIFICANT CHANGE UP (ref 70–99)
HBA1C BLD-MCNC: 5.7 % — HIGH (ref 4–5.6)
HCT VFR BLD CALC: 46.2 % — SIGNIFICANT CHANGE UP (ref 39–50)
HDLC SERPL-MCNC: 54 MG/DL — SIGNIFICANT CHANGE UP
HGB BLD-MCNC: 15 G/DL — SIGNIFICANT CHANGE UP (ref 13–17)
IMM GRANULOCYTES NFR BLD AUTO: 0.3 % — SIGNIFICANT CHANGE UP (ref 0–1.5)
IRON SATN MFR SERPL: 115 UG/DL — SIGNIFICANT CHANGE UP (ref 45–165)
LIPID PNL WITH DIRECT LDL SERPL: 114 MG/DL — HIGH
LYMPHOCYTES # BLD AUTO: 1.77 K/UL — SIGNIFICANT CHANGE UP (ref 1–3.3)
LYMPHOCYTES # BLD AUTO: 28 % — SIGNIFICANT CHANGE UP (ref 13–44)
MCHC RBC-ENTMCNC: 26.5 PG — LOW (ref 27–34)
MCHC RBC-ENTMCNC: 32.5 GM/DL — SIGNIFICANT CHANGE UP (ref 32–36)
MCV RBC AUTO: 81.8 FL — SIGNIFICANT CHANGE UP (ref 80–100)
MONOCYTES # BLD AUTO: 0.35 K/UL — SIGNIFICANT CHANGE UP (ref 0–0.9)
MONOCYTES NFR BLD AUTO: 5.5 % — SIGNIFICANT CHANGE UP (ref 2–14)
NEUTROPHILS # BLD AUTO: 3.64 K/UL — SIGNIFICANT CHANGE UP (ref 1.8–7.4)
NEUTROPHILS NFR BLD AUTO: 57.6 % — SIGNIFICANT CHANGE UP (ref 43–77)
NRBC # BLD: 0 /100 WBCS — SIGNIFICANT CHANGE UP (ref 0–0)
PLATELET # BLD AUTO: 219 K/UL — SIGNIFICANT CHANGE UP (ref 150–400)
POTASSIUM SERPL-MCNC: 4.3 MMOL/L — SIGNIFICANT CHANGE UP (ref 3.5–5.3)
POTASSIUM SERPL-SCNC: 4.3 MMOL/L — SIGNIFICANT CHANGE UP (ref 3.5–5.3)
PROT SERPL-MCNC: 7.2 G/DL — SIGNIFICANT CHANGE UP (ref 6–8.3)
RBC # BLD: 5.65 M/UL — SIGNIFICANT CHANGE UP (ref 4.2–5.8)
RBC # FLD: 14.1 % — SIGNIFICANT CHANGE UP (ref 10.3–14.5)
SODIUM SERPL-SCNC: 140 MMOL/L — SIGNIFICANT CHANGE UP (ref 135–145)
TOTAL CHOLESTEROL/HDL RATIO MEASUREMENT: 3.4 RATIO — SIGNIFICANT CHANGE UP (ref 3.4–9.6)
TRIGL SERPL-MCNC: 91 MG/DL — SIGNIFICANT CHANGE UP (ref 10–149)
TSH SERPL-MCNC: 2.02 UIU/ML — SIGNIFICANT CHANGE UP (ref 0.27–4.2)
VIT B12 SERPL-MCNC: 1869 PG/ML — HIGH (ref 232–1245)
WBC # BLD: 6.32 K/UL — SIGNIFICANT CHANGE UP (ref 3.8–10.5)
WBC # FLD AUTO: 6.32 K/UL — SIGNIFICANT CHANGE UP (ref 3.8–10.5)

## 2019-05-03 PROCEDURE — 82607 VITAMIN B-12: CPT

## 2019-05-03 PROCEDURE — 86008 ALLG SPEC IGE RECOMB EA: CPT

## 2019-05-03 PROCEDURE — 84252 ASSAY OF VITAMIN B-2: CPT

## 2019-05-03 PROCEDURE — 84207 ASSAY OF VITAMIN B-6: CPT

## 2019-05-03 PROCEDURE — 84443 ASSAY THYROID STIM HORMONE: CPT

## 2019-05-03 PROCEDURE — 86003 ALLG SPEC IGE CRUDE XTRC EA: CPT

## 2019-05-03 PROCEDURE — 82306 VITAMIN D 25 HYDROXY: CPT

## 2019-05-03 PROCEDURE — 83540 ASSAY OF IRON: CPT

## 2019-05-03 PROCEDURE — 82652 VIT D 1 25-DIHYDROXY: CPT

## 2019-05-03 PROCEDURE — 84630 ASSAY OF ZINC: CPT

## 2019-05-03 PROCEDURE — 36415 COLL VENOUS BLD VENIPUNCTURE: CPT

## 2019-05-03 PROCEDURE — 82746 ASSAY OF FOLIC ACID SERUM: CPT

## 2019-05-03 PROCEDURE — 80061 LIPID PANEL: CPT

## 2019-05-03 PROCEDURE — 80053 COMPREHEN METABOLIC PANEL: CPT

## 2019-05-03 PROCEDURE — 82728 ASSAY OF FERRITIN: CPT

## 2019-05-03 PROCEDURE — 84590 ASSAY OF VITAMIN A: CPT

## 2019-05-03 PROCEDURE — 85027 COMPLETE CBC AUTOMATED: CPT

## 2019-05-03 PROCEDURE — 82785 ASSAY OF IGE: CPT

## 2019-05-03 PROCEDURE — 84425 ASSAY OF VITAMIN B-1: CPT

## 2019-05-03 PROCEDURE — 83036 HEMOGLOBIN GLYCOSYLATED A1C: CPT

## 2019-05-04 LAB
24R-OH-CALCIDIOL SERPL-MCNC: 43.9 NG/ML — SIGNIFICANT CHANGE UP (ref 30–80)
VIT D25+D1,25 OH+D1,25 PNL SERPL-MCNC: 51.1 PG/ML — SIGNIFICANT CHANGE UP (ref 19.9–79.3)

## 2019-05-07 LAB
PYRIDOXAL PHOS SERPL-MCNC: 68.4 UG/L — HIGH (ref 5.3–46.7)
ZINC SERPL-MCNC: 105 UG/DL — SIGNIFICANT CHANGE UP (ref 56–134)

## 2019-05-08 LAB — VIT B2 SERPL-MCNC: 184 UG/L — SIGNIFICANT CHANGE UP (ref 137–370)

## 2019-05-09 LAB — VIT A SERPL-MCNC: 51.8 UG/DL — SIGNIFICANT CHANGE UP (ref 20.1–62)

## 2019-05-10 LAB
BARLEY IGE QN: <0.1 KUA/L — SIGNIFICANT CHANGE UP
C ALBICANS IGE QN: <0.1 KUA/L — SIGNIFICANT CHANGE UP
CAT DANDER IGE QN: <0.1 KUA/L — SIGNIFICANT CHANGE UP
CLADOSPORIUM IGE QN: 0 — SIGNIFICANT CHANGE UP
CLADOSPORIUM IGE QN: <0.1 KUA/L — SIGNIFICANT CHANGE UP
COMMON RAGWEED IGE QN: <0.1 KUA/L — SIGNIFICANT CHANGE UP
CRAB IGE QN: <0.1 KUA/L — SIGNIFICANT CHANGE UP
D FARINAE IGE QN: 0.28 KUA/L — SIGNIFICANT CHANGE UP
D PTERONYSS IGE QN: <0.1 KUA/L — SIGNIFICANT CHANGE UP
DEPRECATED A FUMIGATUS IGE RAST QL: 0 — SIGNIFICANT CHANGE UP
DEPRECATED BARLEY IGE RAST QL: 0 — SIGNIFICANT CHANGE UP
DEPRECATED C ALBICANS IGE RAST QL: 0 — SIGNIFICANT CHANGE UP
DEPRECATED CAT DANDER IGE RAST QL: 0 — SIGNIFICANT CHANGE UP
DEPRECATED COMMON RAGWEED IGE RAST QL: 0 — SIGNIFICANT CHANGE UP
DEPRECATED CRAB IGE RAST QL: 0 — SIGNIFICANT CHANGE UP
DEPRECATED D FARINAE IGE RAST QL: SIGNIFICANT CHANGE UP
DEPRECATED EGG WHITE IGE RAST QL: SIGNIFICANT CHANGE UP
DEPRECATED M RACEMOSUS IGE RAST QL: 0 — SIGNIFICANT CHANGE UP
DEPRECATED MILK IGE RAST QL: 1
DEPRECATED ROACH IGE RAST QL: 0 — SIGNIFICANT CHANGE UP
DOG DANDER IGE QN: 0 — SIGNIFICANT CHANGE UP
DOG DANDER IGE QN: <0.1 KUA/L — SIGNIFICANT CHANGE UP
DUST ALLERG MIX2 IGE RAST: 0 — SIGNIFICANT CHANGE UP
EGG WHITE IGE QN: 0.29 KUA/L — SIGNIFICANT CHANGE UP
MILK IGE QN: 0.68 KUA/L — HIGH
MOLD ALLERG MIX A IGE QL: <0.1 KUA/L — SIGNIFICANT CHANGE UP
MOLD ALLERG MIX6 IGG QL: <0.1 KUA/L — SIGNIFICANT CHANGE UP
ROACH IGE QN: <0.1 KUA/L — SIGNIFICANT CHANGE UP
TOTAL IGE SMQN RAST: 64 KU/L — SIGNIFICANT CHANGE UP
TOTAL IGE SMQN RAST: 64 KU/L — SIGNIFICANT CHANGE UP

## 2019-05-11 LAB
A-LACTALB IGE QN: 0.52 KUA/L — HIGH
B-LACTOGLOB IGE QN: 0.3 KUA/L — SIGNIFICANT CHANGE UP
B-LACTOGLOB IGE QN: SIGNIFICANT CHANGE UP
CASEIN IGE QN: 0.22 KUA/L — SIGNIFICANT CHANGE UP
CHERRY IGE QN: <0.1 KUA/L — SIGNIFICANT CHANGE UP
DEPRECATED A ALTERNATA IGE RAST QL: <0.1 KUA/L — SIGNIFICANT CHANGE UP
DEPRECATED A-LACTALB IGE RAST QL: 1
DEPRECATED ALTERNARIA IGE RAST QL: 0 — SIGNIFICANT CHANGE UP
DEPRECATED CASEIN IGE RAST QL: SIGNIFICANT CHANGE UP
DEPRECATED CHERRY IGE RAST QL: 0 — SIGNIFICANT CHANGE UP
DEPRECATED CULTIVATED RYE IGE RAST QL: 0 — SIGNIFICANT CHANGE UP
DEPRECATED OAT IGE RAST QL: 0 — SIGNIFICANT CHANGE UP
DEPRECATED PEANUT IGE RAST QL: 0 — SIGNIFICANT CHANGE UP
DEPRECATED SOYBEAN IGE RAST QL: 0 — SIGNIFICANT CHANGE UP
DEPRECATED TIMOTHY IGE RAST QL: 0 — SIGNIFICANT CHANGE UP
DEPRECATED WHEAT IGE RAST QL: 0 — SIGNIFICANT CHANGE UP
OAT IGE QN: <0.1 KUA/L — SIGNIFICANT CHANGE UP
PEANUT IGE QN: <0.1 KUA/L — SIGNIFICANT CHANGE UP
RYE IGE QN: <0.1 KUA/L — SIGNIFICANT CHANGE UP
SOYBEAN IGE QN: <0.1 KUA/L — SIGNIFICANT CHANGE UP
TIMOTHY IGE QN: <0.1 KUA/L — SIGNIFICANT CHANGE UP
TOTAL IGE SMQN RAST: SIGNIFICANT CHANGE UP
TREE ALLERG MIX1 IGE QL: <0.1 KUA/L — SIGNIFICANT CHANGE UP
TREE ALLERG MIX1 IGE RAST: 0 — SIGNIFICANT CHANGE UP
WHEAT IGE QN: <0.1 KUA/L — SIGNIFICANT CHANGE UP

## 2019-05-13 LAB — VIT B1 SERPL-MCNC: 149.3 NMOL/L — SIGNIFICANT CHANGE UP (ref 66.5–200)

## 2019-05-17 ENCOUNTER — APPOINTMENT (OUTPATIENT)
Dept: BARIATRICS | Facility: CLINIC | Age: 49
End: 2019-05-17
Payer: MEDICAID

## 2019-05-17 VITALS
BODY MASS INDEX: 35.22 KG/M2 | DIASTOLIC BLOOD PRESSURE: 90 MMHG | HEIGHT: 65 IN | HEART RATE: 57 BPM | SYSTOLIC BLOOD PRESSURE: 130 MMHG | WEIGHT: 211.42 LBS | OXYGEN SATURATION: 98 %

## 2019-05-17 PROCEDURE — 99214 OFFICE O/P EST MOD 30 MIN: CPT

## 2019-05-17 NOTE — PHYSICAL EXAM
[Obese, well nourished, in no acute distress] : obese, well nourished, in no acute distress [Normal] : affect appropriate [de-identified] : obese, soft, nontender, no evidence of hernia

## 2019-05-17 NOTE — ASSESSMENT
[FreeTextEntry1] : 48 year old male 9 months s/p laparoscopic sleeve gastrectomy presents for follow up visit. He is doing well.  The patient was encouraged to have a low fat, low carbohydrate and high protein diet consisting of three meals and no snacking.. He was also directed to increase water intake. Patient was encouraged to increase cardio as well as strength training.  Labs reviewed B12 elevated, will decrease B12 to every other day.\par \par Nutrition and exercise counseling provided.\par Continue vitamins, decrease B12\par Follow weight at home\par Referred to plastics\par Followup in 3 months, earlier if weight loss plateaus\par Call with any questions or concerns\par

## 2019-05-17 NOTE — REVIEW OF SYSTEMS
[Fever] : no fever [Chills] : no chills [Recent Change In Weight] : ~T no recent weight change [Dysphagia] : no dysphagia [Hoarseness] : no hoarseness [Chest Pain] : no chest pain [Palpitations] : no palpitations [Lower Ext Edema] : no lower extremity edema [Shortness Of Breath] : no shortness of breath [Wheezing] : no wheezing [Cough] : no cough [Abdominal Pain] : no abdominal pain [Vomiting] : no vomiting [Constipation] : no constipation [Diarrhea] : no diarrhea [Reflux/Heartburn] : no reflex/heartburn [Joint Pain] : no joint pain [Negative] : Heme/Lymph [FreeTextEntry2] : weight loss

## 2019-05-17 NOTE — HISTORY OF PRESENT ILLNESS
[Procedure: ___] : Procedure performed: [unfilled]  [de-identified] : 48 year old male nine months s/p laparoscopic sleeve gastrectomy presents for follow up visit.  He is consuming the appropriate amount of protein, but not adequate water daily.   He is taking vitamins as directed.  He denies acid reflux symptoms, nausea, vomiting, diarrhea and constipation. For exercise, he does cardio and strength exercises 2-3 times a week and walks about 1.5 miles twice a week.  He remains of Metformin for diabetes. Patient is interested in abdominalplasty but would like to lose more weight first.\par   [Date of Surgery: ___] : Date of Surgery:   [unfilled] [Surgeon Name:   ___] : Surgeon Name: Dr. HERNANDEZ [Pre-Op Weight ___] : Pre-op weight was [unfilled] lbs

## 2019-05-30 RX ORDER — BUDESONIDE AND FORMOTEROL FUMARATE DIHYDRATE 160; 4.5 UG/1; UG/1
160-4.5 AEROSOL RESPIRATORY (INHALATION) TWICE DAILY
Qty: 1 | Refills: 3 | Status: DISCONTINUED | COMMUNITY
Start: 2019-05-28 | End: 2019-05-30

## 2019-06-06 ENCOUNTER — APPOINTMENT (OUTPATIENT)
Dept: PULMONOLOGY | Facility: CLINIC | Age: 49
End: 2019-06-06

## 2019-07-03 ENCOUNTER — APPOINTMENT (OUTPATIENT)
Dept: PULMONOLOGY | Facility: CLINIC | Age: 49
End: 2019-07-03
Payer: MEDICAID

## 2019-07-03 ENCOUNTER — APPOINTMENT (OUTPATIENT)
Dept: PLASTIC SURGERY | Facility: CLINIC | Age: 49
End: 2019-07-03
Payer: MEDICAID

## 2019-07-03 VITALS
DIASTOLIC BLOOD PRESSURE: 80 MMHG | HEART RATE: 60 BPM | HEIGHT: 65 IN | RESPIRATION RATE: 16 BRPM | WEIGHT: 213 LBS | BODY MASS INDEX: 35.49 KG/M2 | OXYGEN SATURATION: 98 % | SYSTOLIC BLOOD PRESSURE: 130 MMHG

## 2019-07-03 VITALS — WEIGHT: 213 LBS | BODY MASS INDEX: 35.49 KG/M2 | HEIGHT: 65 IN

## 2019-07-03 DIAGNOSIS — M79.3 PANNICULITIS, UNSPECIFIED: ICD-10-CM

## 2019-07-03 DIAGNOSIS — L30.4 ERYTHEMA INTERTRIGO: ICD-10-CM

## 2019-07-03 DIAGNOSIS — Z78.9 OTHER SPECIFIED HEALTH STATUS: ICD-10-CM

## 2019-07-03 DIAGNOSIS — E88.1 LIPODYSTROPHY, NOT ELSEWHERE CLASSIFIED: ICD-10-CM

## 2019-07-03 PROCEDURE — 99204 OFFICE O/P NEW MOD 45 MIN: CPT

## 2019-07-03 PROCEDURE — 99214 OFFICE O/P EST MOD 30 MIN: CPT

## 2019-07-03 NOTE — ASSESSMENT
[FreeTextEntry1] : The pt is doing well from a pulmonary standpoint. \par Encouragement provided for ongoing weightloss.\par The plan is as follows:\par \par #1.Asthma-stable\par -no issues at this point and asymptomatic, prefers to be off inhalers\par -advised him on early interventions for colds/URI\par -Ventolin 2 puffs as needed for SOB or 15 min prior to exercise\par -Serevent 1 puff BID rinse and gargle on HOLD\par -Qvar 80 2 puffs BID rinse and gargle on HOLD\par \par #2.Severe sleep apnea treated with pap therapy\par -max 6 hours of sleep- try to increase activity to tire self out more\par -for "crash" feeling encouraged pt take a 15-20 min MAX nap: A power nap known to improve learning and memory, can help prevent stress, can help boost mood and creativity, can help to jump start productivity and alertness, and can even help lower your risk for heart disease.\par -pt is off PAP therapy- self dc'd due to discomfort and anxiety provoking, pt no longer has it\par -discussed due to his anatomy despite the weight loss his sleep disordered breathing may not be completely treated and he may have a mild to moderate case\par -has pending HST in August 2018\par -The patient is very reluctant to return to CPAP therapy; treatment options for the patient were discussed including new APAP devices and less invasive mask styles.  Additionally we have discussed oral appliance therapy as well. Patient was shown an example and discussed the way in which it would work. Oral appliances are safe, noninvasive and highly effective for treating snoring and varying severities of obstructive sleep apnea. I did discuss the need for a follow up sleep study once the oral appliance was made and fit for comfort to actively assess how well it is treating the condition.  Consequences of untreated sleep apnea were discussed with the patient including [worsening] heart conditions, hypertension, diabetes, chronic inflammation, memory issues, stroke, obesity, decreased libido, sleep related accidents, as well as anxiety and depression. \par -pt could also consider hypoglossal nerve stimulator with more weight loss- this would be an ENT evaluation with Dr. Adair if interested. Inspire works completely inside your body with your natural breathing process. A small device is placed just under your skin during a short outpatient procedure.  It delivers gentle stimulation to key airway muscles during sleep, allowing your airway to remain open.  Simply turn it on with the Inspire Sleep Remote before bed and off when you wake up. \par \par #3.GERD- gen stable- 1 recent flare\par -off PPI and H2 blocker\par -can use h2 blocker prn\par -monitor\par -Things to avoid including overeating, spicy foods, tight clothing, eating within three hours of bed, this list is not all inclusive.\par \par #4.Obesity\par -doing well with progress\par -encouraged to continue on weight loss journey\par -Obesity is associated with worsening asthma, shortness of breath, and potential for cardiac disease, diabetes, and other underlying medical conditions.\par \par Follow up in 4 months with full spirometry and NiOx testing, s/p sleep study\par He is encouraged to call with any changes, concerns, or questions. \par

## 2019-07-03 NOTE — HISTORY OF PRESENT ILLNESS
[FreeTextEntry1] : Mr. Villegas is a 48 year old male presenting to the office for a follow up visit for asthma, GERD, NADINE, postnasal drip, sleep-disordered breathing, snoring, low vitamin D and shortness of breath.\par \par Pt reports that since bariatric surgery he has lost 70lbs. \par His HTN is better, cholesterol is controlled, and type 2 DM is also under control- he is down to 1 metformin a day.\par \par He reports he feels so much better. He reports his sleep has greatly improved. He states that he is now only able to sleep 6 hours and feels refreshed upon awakening and usually goes for a walk. He goes to bed at 1030 and is usually up betweem 430-5 am. He tries to go back to sleep but cant. He reports feeling like he "wants to crash" during the day but he does not nap. Pt was using CPAP until a few months ago. He reports his stepfather threw it away by accident. He reports not dong well with PAP therapy, he found it uncomfortable, claustrophobic, using the mask caused him severe anxiety and insomnia.  He has a scheduled HST in august to re-assess his sleep apnea. \par \par He reports he was sick a few weeks ago and was using inhalers. He was on Serevent and qvar but dc'd it after feeling better. He reports he does not use inhalers when feeling well. He denies any SOB, cough, chest heaviness or tightness. He does not use Ventolin prior to exercise. He denies sinus symptoms at this time but reports he tends to deal with post nasal drip.\par \par He denies current issues with GERD. He had one episode recently but is off reflux meds. He is watching his diet and trying to stay active and hydrated.\par \par He adds he had a colonoscopy recently and "all was normal."\par He also needs a new cardiologist.

## 2019-07-03 NOTE — PHYSICAL EXAM
[Normal Appearance] : normal appearance [General Appearance - Well Developed] : well developed [Well Groomed] : well groomed [General Appearance - Well Nourished] : well nourished [No Deformities] : no deformities [General Appearance - In No Acute Distress] : no acute distress [Normal Conjunctiva] : the conjunctiva exhibited no abnormalities [Eyelids - No Xanthelasma] : the eyelids demonstrated no xanthelasmas [Normal Oropharynx] : normal oropharynx [III] : III [Neck Appearance] : the appearance of the neck was normal [Heart Rate And Rhythm] : heart rate and rhythm were normal [Heart Sounds] : normal S1 and S2 [Murmurs] : no murmurs present [Arterial Pulses Normal] : the arterial pulses were normal [Respiration, Rhythm And Depth] : normal respiratory rhythm and effort [Exaggerated Use Of Accessory Muscles For Inspiration] : no accessory muscle use [Auscultation Breath Sounds / Voice Sounds] : lungs were clear to auscultation bilaterally [Abdomen Soft] : soft [Abdomen Tenderness] : non-tender [Abnormal Walk] : normal gait [Abdomen Mass (___ Cm)] : no abdominal mass palpated [Nail Clubbing] : no clubbing of the fingernails [Cyanosis, Localized] : no localized cyanosis [Petechial Hemorrhages (___cm)] : no petechial hemorrhages [Skin Color & Pigmentation] : normal skin color and pigmentation [] : no rash [Sensation] : the sensory exam was normal to light touch and pinprick [No Focal Deficits] : no focal deficits [Deep Tendon Reflexes (DTR)] : deep tendon reflexes were 2+ and symmetric [Oriented To Time, Place, And Person] : oriented to person, place, and time [Impaired Insight] : insight and judgment were intact [Affect] : the affect was normal

## 2019-07-03 NOTE — REVIEW OF SYSTEMS
[As Noted in HPI] : as noted in HPI [Diabetes] : diabetes mellitus [Difficulty Maintaining Sleep] : difficulty maintaining sleep [Negative] : Psychiatric [Difficulty Initiating Sleep] : no difficulty falling asleep [Snoring] : no snoring [Witnessed Apneas] : demonstrated no ~M apnea [Nonrestorative Sleep] : restorative sleep [Awakes With Headache] : awakes without a headache [Hypersomnolence] : not sleeping much more than usual

## 2019-07-08 PROBLEM — E88.1 LIPODYSTROPHY: Status: ACTIVE | Noted: 2019-07-08

## 2019-07-08 PROBLEM — M79.3 PANNICULITIS: Status: ACTIVE | Noted: 2019-07-08

## 2019-07-08 PROBLEM — L30.4 INTERTRIGO: Status: ACTIVE | Noted: 2019-07-08

## 2019-07-08 NOTE — PHYSICAL EXAM
[de-identified] : The patient is ambulatory, well groomed, no signs of cachexia. [de-identified] : Normocephalic, atraumatic. [de-identified] : No conjunctival erythema, hyperemia, or discharge bilaterally; no ectropion, entropion, lagophthalmos, or lid malposition bilaterally. Pupils are equal, round, and reactive to light bilaterally. [de-identified] : There are no masses, scars, or lesions of the external ear. External nose is midline with no scars or masses. Gross hearing intact bilaterally (finger rub). Nasal mucosa has no edema, lesions, or signs of desiccation. Lips and gums have no masses, lesions, friability, or edema. [de-identified] : Neck is soft without edema, masses, or crepitus. Trachea midline. No thyromegaly; no palpable thyroid nodules. [de-identified] : No swelling, tenderness, or varicosities of the extremities bilaterally; extremities are warm to palpation and pedal pulses intact. [de-identified] : No sign of respiratory distress, no tachypnea, no use of accessory respiratory muscles. [de-identified] : Well healed laparoscopy scars,\par + large hanging infraumbilical pannus noted,\par no intertrigo noted today, no erythema, no open areas,\par No hepatomegaly or splenomegaly. No abdominal wall tenderness or masses on palpation. \par No abdominal wall hernias noted.

## 2019-07-08 NOTE — REASON FOR VISIT
[Consultation] : a consultation visit [Parent] : parent [FreeTextEntry1] : Dr. Love Mireles (Bariatric Surgery)

## 2019-07-08 NOTE — CONSULT LETTER
[Dear  ___] : Dear  [unfilled], [Please see my note below.] : Please see my note below. [Consult Letter:] : I had the pleasure of evaluating your patient, [unfilled]. [Sincerely,] : Sincerely, [Consult Closing:] : Thank you very much for allowing me to participate in the care of this patient.  If you have any questions, please do not hesitate to contact me. [FreeTextEntry3] : Noel Gamboa MD

## 2019-07-08 NOTE — ADDENDUM
[FreeTextEntry1] : All medical record entries made were at my, VALDO HEREDIA MD, direction and personally dictated by me. I have reviewed the chart and agree that the record accurately reflects my personal performance of the history, physical exam, assessment, and plan.

## 2019-07-08 NOTE — ASSESSMENT
[FreeTextEntry1] : 47 yo M w/ PMH significant for DM who presents to the office for an initial consultation at the request of Dr. Mireles (Bariatric Surgery) regarding excess heavy skin of the abdomen. Pt is s/p laparoscopic gastric sleeve surgery on 07/31/18. On examination, the patient has a large, heavy, hanging infraumbilical pannus. Photos were taken in the office today.\par \par Given his symptoms and history of drastic weight loss, panniculectomy abdominoplasty was recommended.\par We discussed the planned procedure including alternatives, risks and potential complications which include but are not limited to infection, bleeding, seroma, asymmetry, deformity, scarring, paresthesia, wound dehiscence.  All questions were answered, and patient would like to proceed with the planned procedure. Recommended to the patient to continue with weight loss prior to proceeding with surgery.\par The patient will be scheduled for an outpatient surgery at the earliest mutually convenient time.

## 2019-08-05 ENCOUNTER — NON-APPOINTMENT (OUTPATIENT)
Age: 49
End: 2019-08-05

## 2019-08-05 ENCOUNTER — APPOINTMENT (OUTPATIENT)
Dept: CARDIOLOGY | Facility: CLINIC | Age: 49
End: 2019-08-05
Payer: MEDICAID

## 2019-08-05 VITALS
DIASTOLIC BLOOD PRESSURE: 83 MMHG | SYSTOLIC BLOOD PRESSURE: 142 MMHG | HEIGHT: 65 IN | HEART RATE: 60 BPM | WEIGHT: 210 LBS | BODY MASS INDEX: 34.99 KG/M2 | RESPIRATION RATE: 16 BRPM

## 2019-08-05 PROCEDURE — 93000 ELECTROCARDIOGRAM COMPLETE: CPT

## 2019-08-05 PROCEDURE — 99204 OFFICE O/P NEW MOD 45 MIN: CPT

## 2019-08-08 ENCOUNTER — OUTPATIENT (OUTPATIENT)
Dept: OUTPATIENT SERVICES | Facility: HOSPITAL | Age: 49
LOS: 1 days | End: 2019-08-08
Payer: MEDICAID

## 2019-08-08 ENCOUNTER — APPOINTMENT (OUTPATIENT)
Dept: SLEEP CENTER | Facility: CLINIC | Age: 49
End: 2019-08-08
Payer: MEDICAID

## 2019-08-08 DIAGNOSIS — Z98.890 OTHER SPECIFIED POSTPROCEDURAL STATES: Chronic | ICD-10-CM

## 2019-08-08 DIAGNOSIS — Z96.652 PRESENCE OF LEFT ARTIFICIAL KNEE JOINT: Chronic | ICD-10-CM

## 2019-08-08 PROCEDURE — 95806 SLEEP STUDY UNATT&RESP EFFT: CPT

## 2019-08-08 PROCEDURE — 95806 SLEEP STUDY UNATT&RESP EFFT: CPT | Mod: 26

## 2019-08-13 DIAGNOSIS — G47.33 OBSTRUCTIVE SLEEP APNEA (ADULT) (PEDIATRIC): ICD-10-CM

## 2019-08-20 ENCOUNTER — APPOINTMENT (OUTPATIENT)
Dept: BARIATRICS | Facility: CLINIC | Age: 49
End: 2019-08-20
Payer: MEDICAID

## 2019-08-20 VITALS
OXYGEN SATURATION: 98 % | HEIGHT: 65 IN | SYSTOLIC BLOOD PRESSURE: 150 MMHG | BODY MASS INDEX: 35.52 KG/M2 | WEIGHT: 213.18 LBS | HEART RATE: 60 BPM | DIASTOLIC BLOOD PRESSURE: 100 MMHG

## 2019-08-20 PROCEDURE — 99214 OFFICE O/P EST MOD 30 MIN: CPT

## 2019-08-20 RX ORDER — BECLOMETHASONE DIPROPIONATE HFA 80 UG/1
80 AEROSOL, METERED RESPIRATORY (INHALATION) TWICE DAILY
Qty: 3 | Refills: 1 | Status: DISCONTINUED | COMMUNITY
Start: 2019-05-30 | End: 2019-08-20

## 2019-08-20 RX ORDER — ALBUTEROL SULFATE 90 UG/1
108 (90 BASE) AEROSOL, METERED RESPIRATORY (INHALATION) EVERY 6 HOURS
Qty: 1 | Refills: 3 | Status: DISCONTINUED | COMMUNITY
Start: 2019-05-28 | End: 2019-08-20

## 2019-08-20 RX ORDER — SALMETEROL XINAFOATE 50 UG/1
50 POWDER, METERED ORAL; RESPIRATORY (INHALATION)
Qty: 3 | Refills: 1 | Status: DISCONTINUED | COMMUNITY
Start: 2019-05-30 | End: 2019-08-20

## 2019-08-20 RX ORDER — OLOPATADINE HYDROCHLORIDE 665 UG/1
0.6 SPRAY, METERED NASAL
Qty: 3 | Refills: 1 | Status: DISCONTINUED | COMMUNITY
Start: 2019-05-28 | End: 2019-08-20

## 2019-08-20 NOTE — ASSESSMENT
[FreeTextEntry1] : 48 year old male one year s/p laparoscopic sleeve gastrectomy presents for follow up visit. He is doing well.  The patient was encouraged to have a low fat, low carbohydrate and high protein diet consisting of three meals a day. Patient was directed to continue walking daily and to add strength exercises. Given literature on exercise. \par \par Nutrition and exercise counseling provided.\par Continue vitamins\par Follow weight at home\par Follow up with cardiologist about elevated BP\par Follow up in 3 months\par Call with any questions or concerns\par

## 2019-08-20 NOTE — PHYSICAL EXAM
[Obese, well nourished, in no acute distress] : obese, well nourished, in no acute distress [Normal] : affect appropriate [de-identified] : EOMI, anicteric  [de-identified] : obese, soft, nontender, no evidence of hernia

## 2019-08-20 NOTE — HISTORY OF PRESENT ILLNESS
[Procedure: ___] : Procedure performed: [unfilled]  [Date of Surgery: ___] : Date of Surgery:   [unfilled] [Surgeon Name:   ___] : Surgeon Name: Dr. HERNANDEZ [Pre-Op Weight ___] : Pre-op weight was [unfilled] lbs [de-identified] : 48 year old male one year s/p laparoscopic sleeve gastrectomy presents for follow up visit. Nutritionist briefly met with patient and it was determined that he was not consuming the appropriate amount of protein and is now having adequate water daily.  He is taking vitamins as directed.  He denies acid reflux symptoms, nausea, vomiting, diarrhea and constipation. For exercise, he is only walking for 40 minutes daily.   He remains of Metformin for diabetes. \par

## 2019-08-20 NOTE — REVIEW OF SYSTEMS
[Negative] : Endocrine [Fever] : no fever [Chills] : no chills [Recent Change In Weight] : ~T no recent weight change [Dysphagia] : no dysphagia [Hoarseness] : no hoarseness [Chest Pain] : no chest pain [Palpitations] : no palpitations [Lower Ext Edema] : no lower extremity edema [Shortness Of Breath] : no shortness of breath [Wheezing] : no wheezing [Cough] : no cough [Abdominal Pain] : no abdominal pain [Vomiting] : no vomiting [Constipation] : no constipation [Diarrhea] : no diarrhea [Reflux/Heartburn] : no reflex/heartburn [Joint Pain] : no joint pain [FreeTextEntry2] : weight gain

## 2019-08-27 ENCOUNTER — NON-APPOINTMENT (OUTPATIENT)
Age: 49
End: 2019-08-27

## 2019-08-27 ENCOUNTER — APPOINTMENT (OUTPATIENT)
Dept: PULMONOLOGY | Facility: CLINIC | Age: 49
End: 2019-08-27
Payer: MEDICAID

## 2019-08-27 VITALS
SYSTOLIC BLOOD PRESSURE: 140 MMHG | DIASTOLIC BLOOD PRESSURE: 90 MMHG | HEIGHT: 64 IN | RESPIRATION RATE: 17 BRPM | BODY MASS INDEX: 36.7 KG/M2 | OXYGEN SATURATION: 98 % | WEIGHT: 215 LBS | HEART RATE: 60 BPM

## 2019-08-27 PROCEDURE — 94010 BREATHING CAPACITY TEST: CPT

## 2019-08-27 PROCEDURE — 95012 NITRIC OXIDE EXP GAS DETER: CPT

## 2019-08-27 PROCEDURE — 99214 OFFICE O/P EST MOD 30 MIN: CPT | Mod: 25

## 2019-08-27 NOTE — ADDENDUM
[FreeTextEntry1] : All medical record entries made by bebeto Verde were at Dr. Rufus Min's, direction and personally dictated by me on 08/27/2019. I have reviewed the chart and agree that the record accurately reflects my personal performance of the history, physical exam, assessment and plan. I have also personally directed, reviewed, and agree with the discharge instructions.

## 2019-08-27 NOTE — HISTORY OF PRESENT ILLNESS
[FreeTextEntry1] : Mr. Villegas is a 48 year old male presenting to the office for a follow up visit for asthma, chronic cough, GERD, NADINE, postnasal drip, sleep-disordered breathing, snoring, low vitamin D and shortness of breath. He is presenting today s/p bariatric surgery. His chief complaint is insufficient sleep.\par -He states that he has generally been feeling well \par -he reports that he has been getting chills / feels cold whenever he goes outside\par -he reports that he has lost about 70 lbs. he has been walking 40 minutes every morning for exercise\par -he states that his bowels have been regular\par -he reports that his sense of taste and smell have been good\par -his sleep is significantly improved and he has not been snoring, however he feels he does not get enough sleep. he has not been using his CPAP machine or any other Rx for his NADINE\par -he states that he would be able to fall asleep while watching a boring TV show, or as a passenger in a car for over one hour \par -his DM has been under control\par -he denies any headaches, nausea, vomiting, fever, sweats, chest pain, chest pressure, diarrhea, constipation, dysphagia, dizziness, leg swelling, leg pain, itchy eyes, itchy ears, heartburn, reflux, or sour taste in the mouth, wheeze.

## 2019-08-27 NOTE — PHYSICAL EXAM
[General Appearance - Well Developed] : well developed [Normal Appearance] : normal appearance [Well Groomed] : well groomed [No Deformities] : no deformities [General Appearance - Well Nourished] : well nourished [General Appearance - In No Acute Distress] : no acute distress [Eyelids - No Xanthelasma] : the eyelids demonstrated no xanthelasmas [Normal Conjunctiva] : the conjunctiva exhibited no abnormalities [III] : III [Normal Oropharynx] : normal oropharynx [Neck Appearance] : the appearance of the neck was normal [Neck Cervical Mass (___cm)] : no neck mass was observed [Jugular Venous Distention Increased] : there was no jugular-venous distention [Thyroid Diffuse Enlargement] : the thyroid was not enlarged [Thyroid Nodule] : there were no palpable thyroid nodules [Heart Sounds] : normal S1 and S2 [Heart Rate And Rhythm] : heart rate and rhythm were normal [Murmurs] : no murmurs present [Exaggerated Use Of Accessory Muscles For Inspiration] : no accessory muscle use [Respiration, Rhythm And Depth] : normal respiratory rhythm and effort [Auscultation Breath Sounds / Voice Sounds] : lungs were clear to auscultation bilaterally [Abdomen Soft] : soft [Abdomen Tenderness] : non-tender [Abnormal Walk] : normal gait [Abdomen Mass (___ Cm)] : no abdominal mass palpated [Cyanosis, Localized] : no localized cyanosis [Gait - Sufficient For Exercise Testing] : the gait was sufficient for exercise testing [Nail Clubbing] : no clubbing of the fingernails [Petechial Hemorrhages (___cm)] : no petechial hemorrhages [No Venous Stasis] : no venous stasis [] : no rash [Skin Color & Pigmentation] : normal skin color and pigmentation [No Skin Ulcers] : no skin ulcer [Skin Lesions] : no skin lesions [No Xanthoma] : no  xanthoma was observed [Deep Tendon Reflexes (DTR)] : deep tendon reflexes were 2+ and symmetric [Sensation] : the sensory exam was normal to light touch and pinprick [No Focal Deficits] : no focal deficits [Oriented To Time, Place, And Person] : oriented to person, place, and time [Impaired Insight] : insight and judgment were intact [Affect] : the affect was normal [FreeTextEntry1] : I:E 1:3, clear

## 2019-08-27 NOTE — ASSESSMENT
[FreeTextEntry1] : Mr. Villegas is a 48 year old male with a history of asthma, allergy, GERD, HTN, diabetes, obesity, who now comes in for a re-evaluation.  He is currently s/p bariatric surgery as a mode of weight loss, now with moderate OSAS which have improved\par \par His shortness of breath (improved) is felt to be related to:\par -overweight/out of shape\par -poor breathing mechanics\par -asthma\par -cardiac at risk \par \par History of a chronic cough felt to be related to:\par -asthma\par -post nasal drip syndrome (active)\par -secondary GERD \par \par problem 1: asthma\par -MCT reveals positivity for asthma\par -continue Breo Ellipta 200 1 inhalation QD\par -continue Ventolin as a rescue inhaler 2 inhalations up to QiD \par -Inhaler technique reviewed as well as oral hygiene techniques reviewed with patient. Avoidance of cold air, extremes of temperature, rescue inhaler should be used before exercise. Order of medication reviewed with patient. Recommended use of a cool mist humidifier in the bedroom. Instructed to gargle and spit after inhaler use. \par -Asthma is  believed to be caused by inherited (genetic) and environmental factor, but its exact cause is unknown. Asthma may be triggered by allergens, lung infections, or irritants in the air. Asthma triggers are different for each person\par \par problem 2: post nasal drip syndrome/allergies\par -continue Qnasl 1 inhalation per nostril BID\par -continue Clarinex 5 mg QHS \par -continue Olopatadine (0.6) 1 sniff/nostril BID\par -Environmental measures for allergies were encouraged including mattress and pillow cover, air purifier, and environmental controls.\par \par problem 3: r/o allergen profile\par -allergy blood work: food IgE level, asthma profile, eosinophil level, IgE level, and vitamin D level (noncompliant)\par \par problem 4: GERD\par -continue Zantac 300 mg QHS\par -Rule of 2s: avoid eating too much, eating too late, eating too spicy, eating two hours before bed\par -Things to avoid including overeating, spicy foods, tight clothing, eating within three hours of bed, this list is not all inclusive. \par -For treatment of reflux, possible options discussed including diet control, H2 blockers, PPIs, as well as coating motility agents discussed as treatment options. Timing of meals and proximity of last meal to sleep were discussed. If symptoms persist, a formal gastrointestinal evaluation is needed.\par \par problem 5: positive NADIEN (moderate) -Noncompliant\par -unable to tolerate CPAP\par -s/p repeat Home sleep study - now moderate \par -secondary sleep study reveals CPAP of 14 cmH2O - follow up study to be complete for re-evaluation after continued weight loss\par -recommended to consider Oral Appliance \par -recommended to use "Chin Strap" \par -reassess for dental device when at his target weight\par -Sleep apnea is associated with adverse clinical consequences which an affect most organ systems.  Cardiovascular disease risk includes arrhythmias, atrial fibrillation, hypertension, coronary artery disease, and stroke. Metabolic disorders include diabetes type 2, non-alcoholic fatty liver disease. Mood disorder especially depression; and cognitive decline especially in the elderly. Associations with  chronic reflux/Anguiano’s esophagus some but not all inclusive. \par -Reasons to assess this include arousal consistent with hypopnea; respiratory events most prominent in REM sleep or supine position; therefore sleep staging and body position are important for accurate diagnosis and estimation of AHI.\par -Treatment options discussed including CPAP/BiPAP machine, oral appliance, ProVent therapy, Oxy-Aid by [employer]pite[employer], new technologies, or positional sleep.Recommended use of the CPAP machine for moderate (AHI >15), moderate to severe (AHI 15-30) and severe patients (AHI > 30). Recommended weight loss which can reduce AHI especially in weight loss of greater than 5% of BMI. Positional sleep is recommended in those with low AHI, low-moderate BMI, and younger age. For severe sleep apnea, the hypoglossal nerve stimulator was recommended as well. \par \par problem 6: obesity/out of shape -(improved)\par -recommended to read "[employer]" , Engine 2" and "Obesity Code" \par -Weight loss, exercise, and diet control were discussed and are highly encouraged. Treatment options were given such as, aqua therapy, and contacting a nutritionist. Recommended to use the elliptical, stationary bike, less use of treadmill.  Obesity is associated with worsening asthma, shortness of breath, and potential for cardiac disease, diabetes, and other underlying medical conditions.\par \par problem 7: poor breathing mechanics\par -Proper breathing techniques were reviewed with an emphasis of exhalation. Patient instructed to breath in for 1 second and out for four seconds. Patient was encouraged to not talk while walking. \par \par problem 8: health maintenance \par -received  2018 flu shot. \par -recommended strep pneumonia vaccines: Prevnar-13 vaccine, followed by Pneumo vaccine 23 one year following\par -recommended early intervention for URIs\par -recommended regular osteoporosis evaluations\par -recommended early dermatological evaluations\par -recommended after the age of 50 to the age of 70, colonoscopy every 5 years \par \par Follow up in 4 months with full spirometry and NiOx testing\par He is encouraged to call with any changes, concerns, or questions.

## 2019-08-27 NOTE — REVIEW OF SYSTEMS
[Negative] : Pulmonary Hypertension [As Noted in HPI] : as noted in HPI [Recent Wt Loss (___ Lbs)] : recent [unfilled] ~Ulb weight loss [FreeTextEntry2] : rhinorrhea

## 2019-08-27 NOTE — PROCEDURE
[FreeTextEntry1] : PFT - spi reveals normal flows; FEV1 is 3.04 which is 92% of predicted, normal flow volume loop \par \par Sleep Study (August 8, 2019) reveals SELENA of 18.9; MARÍA of 15.7 - c/w moderate NADINE.\par \par FENO was 6; a normal value being less than 25\par \par Fractional exhaled nitric oxide (FENO) is regarded as a simple, noninvasive method for assessing eosinophilic airway inflammation. Produced by a variety of cells within the lung, nitric oxide (NO) concentrations are generally low in healthy individuals. However, high concentrations of NO appear to be involved in nonspecific host defense mechanisms and chronic inflammatory diseases such as asthma. The American Thoracic Society (ATS) therefore has recommended using FENO to aid in the diagnosis and monitoring of eosinophilic airway inflammation and asthma, and for identifying steroid responsive individuals whose chronic respiratory symptoms may be caused by airway inflammation.

## 2019-09-09 ENCOUNTER — APPOINTMENT (OUTPATIENT)
Dept: CARDIOLOGY | Facility: CLINIC | Age: 49
End: 2019-09-09
Payer: MEDICAID

## 2019-09-09 VITALS
DIASTOLIC BLOOD PRESSURE: 84 MMHG | WEIGHT: 213 LBS | HEART RATE: 58 BPM | SYSTOLIC BLOOD PRESSURE: 134 MMHG | RESPIRATION RATE: 15 BRPM | HEIGHT: 64 IN | BODY MASS INDEX: 36.37 KG/M2

## 2019-09-09 PROCEDURE — 93306 TTE W/DOPPLER COMPLETE: CPT

## 2019-09-09 PROCEDURE — 93015 CV STRESS TEST SUPVJ I&R: CPT

## 2019-09-09 PROCEDURE — 99213 OFFICE O/P EST LOW 20 MIN: CPT | Mod: 25

## 2019-09-09 PROCEDURE — ZZZZZ: CPT

## 2019-11-04 ENCOUNTER — APPOINTMENT (OUTPATIENT)
Dept: CARDIOLOGY | Facility: CLINIC | Age: 49
End: 2019-11-04
Payer: MEDICAID

## 2019-11-04 PROCEDURE — 78452 HT MUSCLE IMAGE SPECT MULT: CPT

## 2019-11-04 PROCEDURE — 93015 CV STRESS TEST SUPVJ I&R: CPT

## 2019-11-04 PROCEDURE — A9500: CPT

## 2019-11-12 ENCOUNTER — APPOINTMENT (OUTPATIENT)
Dept: BARIATRICS | Facility: CLINIC | Age: 49
End: 2019-11-12
Payer: MEDICAID

## 2019-11-12 VITALS
HEART RATE: 73 BPM | SYSTOLIC BLOOD PRESSURE: 160 MMHG | HEIGHT: 64 IN | DIASTOLIC BLOOD PRESSURE: 90 MMHG | BODY MASS INDEX: 36.37 KG/M2 | OXYGEN SATURATION: 97 % | WEIGHT: 213 LBS

## 2019-11-12 PROCEDURE — 99214 OFFICE O/P EST MOD 30 MIN: CPT

## 2019-11-14 NOTE — PHYSICAL EXAM
[Obese, well nourished, in no acute distress] : obese, well nourished, in no acute distress [Normal] : affect appropriate [de-identified] : EOMI, anicteric  [de-identified] : obese, soft, nontender, no evidence of hernia

## 2019-11-14 NOTE — REVIEW OF SYSTEMS
[Negative] : Endocrine [Fever] : no fever [Chills] : no chills [Recent Change In Weight] : ~T no recent weight change [Dysphagia] : no dysphagia [Hoarseness] : no hoarseness [Palpitations] : no palpitations [Lower Ext Edema] : no lower extremity edema [Chest Pain] : no chest pain [Shortness Of Breath] : no shortness of breath [Wheezing] : no wheezing [Abdominal Pain] : no abdominal pain [Cough] : no cough [Vomiting] : no vomiting [Constipation] : no constipation [Diarrhea] : no diarrhea [Reflux/Heartburn] : no reflex/heartburn [Joint Pain] : no joint pain [FreeTextEntry2] : weight gain

## 2019-11-14 NOTE — ASSESSMENT
[FreeTextEntry1] : 48 year old male over a year s/p laparoscopic sleeve gastrectomy presents for follow up visit. He is doing well.  The patient was encouraged to have a low fat, low carbohydrate and high protein diet consisting of three meals a day and to minimize snacking on chips and cheese. It was suggested that he attend post op nutrition class, food journal and make nutritionist appointment. Patient was again directed to continue walking daily and to add strength exercises. \par \par Nutrition and exercise counseling provided.\par Continue vitamins\par Follow up with cardiologist about elevated BP\par Food journal and nutritionist appointment\par Follow up in 3 months\par Call with any questions or concerns\par

## 2019-11-14 NOTE — HISTORY OF PRESENT ILLNESS
[Procedure: ___] : Procedure performed: [unfilled]  [Date of Surgery: ___] : Date of Surgery:   [unfilled] [Surgeon Name:   ___] : Surgeon Name: Dr. HERNANDEZ [Pre-Op Weight ___] : Pre-op weight was [unfilled] lbs [de-identified] : 48 year old male over a year s/p laparoscopic sleeve gastrectomy presents for follow up visit. Based on brief diet recall, he is not having appropriate amount of protein daily, but is having adequate water daily. He also incorporated protein sources higher in fat including woodard, bolonga and cheeses. He snacks on regular baby bell and chips daily.  He is taking vitamins as directed.  He denies acid reflux symptoms, nausea, vomiting, diarrhea and constipation. For exercise, he power walks for 40 minutes daily.  He remains of Metformin for diabetes. Patient recently had stress testing.

## 2019-12-09 ENCOUNTER — APPOINTMENT (OUTPATIENT)
Dept: CARDIOLOGY | Facility: CLINIC | Age: 49
End: 2019-12-09
Payer: MEDICAID

## 2019-12-09 VITALS
SYSTOLIC BLOOD PRESSURE: 128 MMHG | BODY MASS INDEX: 36.02 KG/M2 | HEART RATE: 70 BPM | RESPIRATION RATE: 16 BRPM | DIASTOLIC BLOOD PRESSURE: 83 MMHG | HEIGHT: 64 IN | WEIGHT: 211 LBS

## 2019-12-09 DIAGNOSIS — Z87.891 PERSONAL HISTORY OF NICOTINE DEPENDENCE: ICD-10-CM

## 2019-12-09 PROCEDURE — 99213 OFFICE O/P EST LOW 20 MIN: CPT

## 2019-12-27 ENCOUNTER — APPOINTMENT (OUTPATIENT)
Dept: PULMONOLOGY | Facility: CLINIC | Age: 49
End: 2019-12-27
Payer: MEDICAID

## 2019-12-27 ENCOUNTER — NON-APPOINTMENT (OUTPATIENT)
Age: 49
End: 2019-12-27

## 2019-12-27 VITALS
DIASTOLIC BLOOD PRESSURE: 80 MMHG | HEART RATE: 76 BPM | OXYGEN SATURATION: 98 % | SYSTOLIC BLOOD PRESSURE: 130 MMHG | BODY MASS INDEX: 36.37 KG/M2 | HEIGHT: 64 IN | WEIGHT: 213 LBS | RESPIRATION RATE: 17 BRPM

## 2019-12-27 DIAGNOSIS — G47.30 SLEEP APNEA, UNSPECIFIED: ICD-10-CM

## 2019-12-27 PROCEDURE — 99214 OFFICE O/P EST MOD 30 MIN: CPT | Mod: 25

## 2019-12-27 PROCEDURE — 95012 NITRIC OXIDE EXP GAS DETER: CPT

## 2019-12-27 PROCEDURE — 94010 BREATHING CAPACITY TEST: CPT

## 2019-12-27 RX ORDER — METFORMIN HYDROCHLORIDE 1000 MG/1
1000 TABLET, COATED ORAL
Qty: 180 | Refills: 3 | Status: DISCONTINUED | COMMUNITY
End: 2019-12-27

## 2019-12-27 NOTE — PROCEDURE
[FreeTextEntry1] : PFT revealed normal flows, with a FEV1 of 3,15L, which is 96% of predicted, with a normal flow volume loop \par \par FENO was 15; a normal value being less than 25\par Fractional exhaled nitric oxide (FENO) is regarded as a simple, noninvasive method for assessing eosinophilic airway inflammation. Produced by a variety of cells within the lung, nitric oxide (NO) concentrations are generally low in healthy individuals. However, high concentrations of NO appear to be involved in nonspecific host defense mechanisms and chronic inflammatory diseases such as asthma. The American Thoracic Society (ATS) therefore has recommended using FENO to aid in the diagnosis and monitoring of eosinophilic airway inflammation and asthma, and for identifying steroid responsive individuals whose chronic respiratory symptoms may be caused by airway inflammation.

## 2019-12-27 NOTE — HISTORY OF PRESENT ILLNESS
[FreeTextEntry1] : Mr. Villegas is a 48 year old male presenting to the office for a follow up visit for asthma, chronic cough, GERD, NADINE, postnasal drip, sleep-disordered breathing, snoring, low vitamin D and shortness of breath. He is presenting today s/p bariatric surgery. His chief complaint is \par \par -He notes he is generally well. \par His metformin was changed to Losartan and he is taking stegaltro for DM.\par -He states he sleeps well with 6 hours of sleep. \par -He notes he hasn't been exercising but will be starting a routine soon. \par -He reports his weight is stable.\par -he reports that he is not using his CPAP and is not currently snoring but wakes up with nocturia. \par -He notes he has pain in his knees. \par -He states he is not taking no new med but it taking vitamin B12 and D3 and multivitamin. \par -He reports his sinuses are clear. \par -He notes \par \par -he denies any headaches, nausea, vomiting, fever, chills, sweats, chest pain, chest pressure, diarrhea, constipation, dysphagia, dizziness, sour taste in the mouth, leg swelling, leg pain, itchy eyes, itchy ears, heartburn, reflux, myalgias or arthralgias.

## 2019-12-27 NOTE — ASSESSMENT
[FreeTextEntry1] : Mr. Villegas is a 49 year old male with a history of asthma, allergy, GERD, HTN, diabetes, obesity, who now comes in for a re-evaluation.  He is currently s/p bariatric surgery as a mode of weight loss, now with moderate OSAS now asymptomatic. \par His shortness of breath (improved) is felt to be related to:\par -overweight/out of shape\par -poor breathing mechanics\par -asthma\par -cardiac at risk \par \par History of a chronic cough felt to be related to:\par -asthma\par -post nasal drip syndrome (active)\par -secondary GERD \par \par problem 1: asthma (stable)\par -MCT reveals positivity for asthma\par -continue Breo Ellipta 200 1 inhalation QD\par -continue Ventolin as a rescue inhaler 2 inhalations up to QiD \par -Inhaler technique reviewed as well as oral hygiene techniques reviewed with patient. Avoidance of cold air, extremes of temperature, rescue inhaler should be used before exercise. Order of medication reviewed with patient. Recommended use of a cool mist humidifier in the bedroom. Instructed to gargle and spit after inhaler use. \par -Asthma is  believed to be caused by inherited (genetic) and environmental factor, but its exact cause is unknown. Asthma may be triggered by allergens, lung infections, or irritants in the air. Asthma triggers are different for each person\par \par problem 2: post nasal drip syndrome/allergies\par -continue Qnasl 1 inhalation per nostril BID\par -continue Clarinex 5 mg QHS \par -continue Olopatadine (0.6) 1 sniff/nostril BID\par -Environmental measures for allergies were encouraged including mattress and pillow cover, air purifier, and environmental controls.\par \par problem 3: r/o allergen profile\par -allergy blood work: food IgE level, asthma profile, eosinophil level, IgE level, and vitamin D level (noncompliant)\par \par problem 4: GERD\par  -Add Pepcid 40 mg QHS \par -Rule of 2s: avoid eating too much, eating too late, eating too spicy, eating two hours before bed\par -Things to avoid including overeating, spicy foods, tight clothing, eating within three hours of bed, this list is not all inclusive. \par -For treatment of reflux, possible options discussed including diet control, H2 blockers, PPIs, as well as coating motility agents discussed as treatment options. Timing of meals and proximity of last meal to sleep were discussed. If symptoms persist, a formal gastrointestinal evaluation is needed.\par \par problem 5: positive NADINE (moderate) -Noncompliant\par -unable to tolerate CPAP\par -s/p repeat Home sleep study - now moderate \par -secondary sleep study reveals CPAP of 14 cmH2O - follow up study to be complete for re-evaluation\par -recommended to consider Oral Appliance \par -recommended to use "Chin Strap" \par -reassess for dental device when at his target weight\par -Sleep apnea is associated with adverse clinical consequences which an affect most organ systems.  Cardiovascular disease risk includes arrhythmias, atrial fibrillation, hypertension, coronary artery disease, and stroke. Metabolic disorders include diabetes type 2, non-alcoholic fatty liver disease. Mood disorder especially depression; and cognitive decline especially in the elderly. Associations with  chronic reflux/Anguiano’s esophagus some but not all inclusive. \par -Reasons to assess this include arousal consistent with hypopnea; respiratory events most prominent in REM sleep or supine position; therefore sleep staging and body position are important for accurate diagnosis and estimation of AHI.\par -Treatment options discussed including CPAP/BiPAP machine, oral appliance, ProVent therapy, Oxy-Aid by [employer]pite[employer], new technologies, or positional sleep.Recommended use of the CPAP machine for moderate (AHI >15), moderate to severe (AHI 15-30) and severe patients (AHI > 30). Recommended weight loss which can reduce AHI especially in weight loss of greater than 5% of BMI. Positional sleep is recommended in those with low AHI, low-moderate BMI, and younger age. For severe sleep apnea, the hypoglossal nerve stimulator was recommended as well. \par \par problem 6: obesity/out of shape -(improved)\par -recommended to read "[employer]" , Engine 2" and "Obesity Code" \par -Weight loss, exercise, and diet control were discussed and are highly encouraged. Treatment options were given such as, aqua therapy, and contacting a nutritionist. Recommended to use the elliptical, stationary bike, less use of treadmill.  Obesity is associated with worsening asthma, shortness of breath, and potential for cardiac disease, diabetes, and other underlying medical conditions.\par \par problem 7: poor breathing mechanics\par -Proper breathing techniques were reviewed with an emphasis of exhalation. Patient instructed to breath in for 1 second and out for four seconds. Patient was encouraged to not talk while walking. \par \par problem 8: health maintenance \par -received  2019 flu shot. \par -recommended strep pneumonia vaccines: Prevnar-13 vaccine, followed by Pneumo vaccine 23 one year following\par -recommended early intervention for URIs\par -recommended regular osteoporosis evaluations\par -recommended early dermatological evaluations\par -recommended after the age of 50 to the age of 70, colonoscopy every 5 years \par \par Follow up in 4 months with full spirometry and NiOx testing\par He is encouraged to call with any changes, concerns, or questions.

## 2019-12-27 NOTE — PHYSICAL EXAM
[General Appearance - Well Developed] : well developed [Normal Appearance] : normal appearance [Well Groomed] : well groomed [General Appearance - Well Nourished] : well nourished [No Deformities] : no deformities [General Appearance - In No Acute Distress] : no acute distress [Normal Conjunctiva] : the conjunctiva exhibited no abnormalities [Eyelids - No Xanthelasma] : the eyelids demonstrated no xanthelasmas [Normal Oropharynx] : normal oropharynx [III] : III [Neck Appearance] : the appearance of the neck was normal [Neck Cervical Mass (___cm)] : no neck mass was observed [Jugular Venous Distention Increased] : there was no jugular-venous distention [Thyroid Diffuse Enlargement] : the thyroid was not enlarged [Thyroid Nodule] : there were no palpable thyroid nodules [Heart Rate And Rhythm] : heart rate and rhythm were normal [Heart Sounds] : normal S1 and S2 [Murmurs] : no murmurs present [Respiration, Rhythm And Depth] : normal respiratory rhythm and effort [Exaggerated Use Of Accessory Muscles For Inspiration] : no accessory muscle use [Auscultation Breath Sounds / Voice Sounds] : lungs were clear to auscultation bilaterally [Abdomen Soft] : soft [Abdomen Tenderness] : non-tender [Abdomen Mass (___ Cm)] : no abdominal mass palpated [Abnormal Walk] : normal gait [Gait - Sufficient For Exercise Testing] : the gait was sufficient for exercise testing [Nail Clubbing] : no clubbing of the fingernails [Cyanosis, Localized] : no localized cyanosis [Petechial Hemorrhages (___cm)] : no petechial hemorrhages [Skin Color & Pigmentation] : normal skin color and pigmentation [] : no rash [No Skin Ulcers] : no skin ulcer [No Venous Stasis] : no venous stasis [Skin Lesions] : no skin lesions [Deep Tendon Reflexes (DTR)] : deep tendon reflexes were 2+ and symmetric [Sensation] : the sensory exam was normal to light touch and pinprick [No Xanthoma] : no  xanthoma was observed [Oriented To Time, Place, And Person] : oriented to person, place, and time [Impaired Insight] : insight and judgment were intact [Affect] : the affect was normal [No Focal Deficits] : no focal deficits [FreeTextEntry1] : I:E 1:3, clear

## 2020-02-18 ENCOUNTER — APPOINTMENT (OUTPATIENT)
Dept: BARIATRICS | Facility: CLINIC | Age: 50
End: 2020-02-18
Payer: MEDICAID

## 2020-02-18 VITALS
DIASTOLIC BLOOD PRESSURE: 80 MMHG | BODY MASS INDEX: 37.26 KG/M2 | WEIGHT: 218.25 LBS | OXYGEN SATURATION: 98 % | SYSTOLIC BLOOD PRESSURE: 116 MMHG | HEIGHT: 64 IN | HEART RATE: 65 BPM

## 2020-02-18 DIAGNOSIS — R63.4 ABNORMAL WEIGHT LOSS: ICD-10-CM

## 2020-02-18 PROCEDURE — 99214 OFFICE O/P EST MOD 30 MIN: CPT

## 2020-02-19 NOTE — HISTORY OF PRESENT ILLNESS
[Date of Surgery: ___] : Date of Surgery:   [unfilled] [Procedure: ___] : Procedure performed: [unfilled]  [Surgeon Name:   ___] : Surgeon Name: Dr. HERNANDEZ [Pre-Op Weight ___] : Pre-op weight was [unfilled] lbs [de-identified] : 49 year old M  s/p lap sleeve gastrectomy and intra- op EGD. Weight gain since last visit.Denies any food intolerances.Pt continues to consuming  protein sources higher in fat including woodard, Bolog[place] and cheeses. He snacks on regular baby bell and chips since last visit. Admits to consuming 2 % milk daily. Pt aware he needs to work on consuming lean  protein and consuming a sufficient amount of zero calorie liquid per day.Consuming 3 meals per day. Patient is taking vitamin supplements as directed. No change in BM. No reflux symptoms. Exercising regularly incorporating cardio and strength training. Patient does not feel the need to see nutritionist at this time- aware of changes he needs to make. Plan to have routine blood work performed prior to next visit.

## 2020-02-19 NOTE — ASSESSMENT
[FreeTextEntry1] : 48 yo M s/p laparoscopic sleeve gastrectomy presents for follow up visit. The patient was encouraged to have a low fat, low carbohydrate and high protein diet consisting of three meals a day and to minimize snacking on chips and cheese. It was suggested that he attend post op nutrition class, food journal and make nutritionist appointment. \par \par \par Nutrition and exercise counseling provided- including cardio and strength training. \par Continue multivitamins as directed. \par Follow up with cardiologist scheduled in May 2020- plan to have routine blood work performed then. \par Recommended Food journal and nutritionist appointment \par Dr. Mireles saw patient today.\par Follow up in 8 -12 weeks. \par Call with any questions or concerns\par

## 2020-02-19 NOTE — REVIEW OF SYSTEMS
[Negative] : Endocrine [Fever] : no fever [Chills] : no chills [Recent Change In Weight] : ~T no recent weight change [Dysphagia] : no dysphagia [Hoarseness] : no hoarseness [Chest Pain] : no chest pain [Palpitations] : no palpitations [Lower Ext Edema] : no lower extremity edema [Shortness Of Breath] : no shortness of breath [Wheezing] : no wheezing [Cough] : no cough [Abdominal Pain] : no abdominal pain [Vomiting] : no vomiting [Constipation] : no constipation [Diarrhea] : no diarrhea [Reflux/Heartburn] : no reflex/heartburn [Joint Pain] : no joint pain [FreeTextEntry2] : weight gain since last visit.

## 2020-02-19 NOTE — PHYSICAL EXAM
[Obese, well nourished, in no acute distress] : obese, well nourished, in no acute distress [Normal] : grossly intact [de-identified] : EOMI, anicteric  [de-identified] : obese, soft, nontender, no evidence of hernia

## 2020-04-27 ENCOUNTER — APPOINTMENT (OUTPATIENT)
Dept: PULMONOLOGY | Facility: CLINIC | Age: 50
End: 2020-04-27

## 2020-05-01 ENCOUNTER — APPOINTMENT (OUTPATIENT)
Dept: PULMONOLOGY | Facility: CLINIC | Age: 50
End: 2020-05-01
Payer: MEDICAID

## 2020-05-01 VITALS
HEIGHT: 64 IN | RESPIRATION RATE: 17 BRPM | DIASTOLIC BLOOD PRESSURE: 80 MMHG | SYSTOLIC BLOOD PRESSURE: 122 MMHG | BODY MASS INDEX: 37.56 KG/M2 | OXYGEN SATURATION: 98 % | HEART RATE: 63 BPM | TEMPERATURE: 98 F | WEIGHT: 220 LBS

## 2020-05-01 PROCEDURE — 99214 OFFICE O/P EST MOD 30 MIN: CPT | Mod: 25

## 2020-05-01 NOTE — HISTORY OF PRESENT ILLNESS
[FreeTextEntry1] : Mr. Villegas is a 49 year old male presenting to the office for a follow up visit for asthma, chronic cough, GERD, NADINE, postnasal drip, sleep-disordered breathing, snoring, low vitamin D and shortness of breath. He is presenting today s/p bariatric surgery. His chief complaint is recent weight gain. \par - He has gained 7 pounds in the last 6 weeks\par - His energy level is good \par - His work is on hold due to Covid pandemic \par - No wheezing \par - His breathing has been good \par - His sleeping is good \par - He is not snoring \par - He is having hiccups \par - denies any headaches, nausea, vomiting, fever, chills, sweats, chest pain, chest pressure, diarrhea, constipation, dysphagia, dizziness, leg swelling, leg pain, itchy eyes, itchy ears, heartburn, reflux, or sour taste in the mouth.\par \par \par

## 2020-05-01 NOTE — ADDENDUM
[FreeTextEntry1] : Documented by Machelle Adame acting as a scribe for Dr. Rufus Min on (05/01/2020).\par \par All medical record entries made by the Scribe were at my, Dr. Rufus Min's, direction and personally dictated by me on (05/01/2020). I have reviewed the chart and agree that the record accurately reflects my personal performance of the history, physical exam, assessment and plan. I have also personally directed, reviewed, and agree with the discharge instructions. \par \par \par

## 2020-05-01 NOTE — PHYSICAL EXAM
[Normal Appearance] : normal appearance [General Appearance - Well Developed] : well developed [Well Groomed] : well groomed [No Deformities] : no deformities [General Appearance - Well Nourished] : well nourished [General Appearance - In No Acute Distress] : no acute distress [Normal Conjunctiva] : the conjunctiva exhibited no abnormalities [Eyelids - No Xanthelasma] : the eyelids demonstrated no xanthelasmas [III] : III [Normal Oropharynx] : normal oropharynx [Neck Cervical Mass (___cm)] : no neck mass was observed [Neck Appearance] : the appearance of the neck was normal [Jugular Venous Distention Increased] : there was no jugular-venous distention [Thyroid Diffuse Enlargement] : the thyroid was not enlarged [Thyroid Nodule] : there were no palpable thyroid nodules [Heart Rate And Rhythm] : heart rate and rhythm were normal [Murmurs] : no murmurs present [Heart Sounds] : normal S1 and S2 [Respiration, Rhythm And Depth] : normal respiratory rhythm and effort [Exaggerated Use Of Accessory Muscles For Inspiration] : no accessory muscle use [Auscultation Breath Sounds / Voice Sounds] : lungs were clear to auscultation bilaterally [Abdomen Soft] : soft [Abdomen Tenderness] : non-tender [Abdomen Mass (___ Cm)] : no abdominal mass palpated [Abnormal Walk] : normal gait [Nail Clubbing] : no clubbing of the fingernails [Gait - Sufficient For Exercise Testing] : the gait was sufficient for exercise testing [Petechial Hemorrhages (___cm)] : no petechial hemorrhages [Cyanosis, Localized] : no localized cyanosis [] : no rash [Skin Color & Pigmentation] : normal skin color and pigmentation [No Venous Stasis] : no venous stasis [No Xanthoma] : no  xanthoma was observed [No Skin Ulcers] : no skin ulcer [Skin Lesions] : no skin lesions [Deep Tendon Reflexes (DTR)] : deep tendon reflexes were 2+ and symmetric [Sensation] : the sensory exam was normal to light touch and pinprick [No Focal Deficits] : no focal deficits [Oriented To Time, Place, And Person] : oriented to person, place, and time [Affect] : the affect was normal [Impaired Insight] : insight and judgment were intact [FreeTextEntry1] : I:E 1:3, mild expiratory wheezes

## 2020-05-01 NOTE — ASSESSMENT
[FreeTextEntry1] : Mr. Villegas is a 49 year old male with a history of asthma, allergy, GERD, HTN, diabetes, obesity, who now comes in for a re-evaluation.  He is currently s/p bariatric surgery (7/2018) as a mode of weight loss, now with moderate OSAS which have improved- stable.\par  \par His shortness of breath (improved) is felt to be related to:\par -overweight/out of shape\par -poor breathing mechanics\par -asthma\par -cardiac at risk \par \par History of a chronic cough felt to be related to:\par -asthma\par -post nasal drip syndrome \par -secondary GERD \par \par problem 1: asthma (stable)\par -MCT reveals positivity for asthma\par -continue Breo Ellipta 200 1 inhalation QD\par -continue Ventolin as a rescue inhaler 2 inhalations up to QiD \par -Inhaler technique reviewed as well as oral hygiene techniques reviewed with patient. Avoidance of cold air, extremes of temperature, rescue inhaler should be used before exercise. Order of medication reviewed with patient. Recommended use of a cool mist humidifier in the bedroom. Instructed to gargle and spit after inhaler use. \par -Asthma is  believed to be caused by inherited (genetic) and environmental factor, but its exact cause is unknown. Asthma may be triggered by allergens, lung infections, or irritants in the air. Asthma triggers are different for each person\par \par problem 2: post nasal drip syndrome/allergies\par -continue Qnasl 1 inhalation per nostril BID\par -continue Clarinex 5 mg QHS \par -continue Olopatadine (0.6) 1 sniff/nostril BID\par -Environmental measures for allergies were encouraged including mattress and pillow cover, air purifier, and environmental controls.\par \par problem 3: r/o allergen profile\par -allergy blood work: food IgE level, asthma profile, eosinophil level, IgE level, and vitamin D level (noncompliant)\par \par problem 4: GERD\par  -Add Pepcid 40 mg QHS \par -Rule of 2s: avoid eating too much, eating too late, eating too spicy, eating two hours before bed\par -Things to avoid including overeating, spicy foods, tight clothing, eating within three hours of bed, this list is not all inclusive. \par -For treatment of reflux, possible options discussed including diet control, H2 blockers, PPIs, as well as coating motility agents discussed as treatment options. Timing of meals and proximity of last meal to sleep were discussed. If symptoms persist, a formal gastrointestinal evaluation is needed.\par \par problem 5: positive NADINE (moderate) -Noncompliant\par -unable to tolerate CPAP\par -s/p repeat Home sleep study - now moderate \par -secondary sleep study reveals CPAP of 14 cmH2O - follow up study to be complete for re-evaluation\par -recommended to consider Oral Appliance \par -recommended to use "Chin Strap" \par -reassess for dental device when at his target weight\par -Sleep apnea is associated with adverse clinical consequences which an affect most organ systems.  Cardiovascular disease risk includes arrhythmias, atrial fibrillation, hypertension, coronary artery disease, and stroke. Metabolic disorders include diabetes type 2, non-alcoholic fatty liver disease. Mood disorder especially depression; and cognitive decline especially in the elderly. Associations with  chronic reflux/Anguiano’s esophagus some but not all inclusive. \par -Reasons to assess this include arousal consistent with hypopnea; respiratory events most prominent in REM sleep or supine position; therefore sleep staging and body position are important for accurate diagnosis and estimation of AHI.\par -Treatment options discussed including CPAP/BiPAP machine, oral appliance, ProVent therapy, Oxy-Aid by Respitec, new technologies, or positional sleep.Recommended use of the CPAP machine for moderate (AHI >15), moderate to severe (AHI 15-30) and severe patients (AHI > 30). Recommended weight loss which can reduce AHI especially in weight loss of greater than 5% of BMI. Positional sleep is recommended in those with low AHI, low-moderate BMI, and younger age. For severe sleep apnea, the hypoglossal nerve stimulator was recommended as well. \par \par problem 6: obesity/out of shape -(improved)\par -recommended to read "[employer]" , Engine 2" and "Obesity Code" \par -Weight loss, exercise, and diet control were discussed and are highly encouraged. Treatment options were given such as, aqua therapy, and contacting a nutritionist. Recommended to use the elliptical, stationary bike, less use of treadmill.  Obesity is associated with worsening asthma, shortness of breath, and potential for cardiac disease, diabetes, and other underlying medical conditions.\par \par problem 7: poor breathing mechanics\par -Proper breathing techniques were reviewed with an emphasis of exhalation. Patient instructed to breath in for 1 second and out for four seconds. Patient was encouraged to not talk while walking. \par \par Problem 8a: Health Maintenance/COVID19 Precautions \par - Clean your hands often. Wash your hands often with soap and water for at least 20 seconds, especially after blowing your nose, coughing, or sneezing, or having been in a public place.\par - If soap and water are not available, use a hand  that contains at least 60% alcohol.\par - To the extent possible, avoid touching high-touch surfaces in public places - elevator buttons, door handles, handrails, handshaking with people, etc. Use a tissue or your sleeve to cover your hand or finger if you must touch something.\par - Wash your hands after touching surfaces in public places.\par - Avoid touching your face, nose, eyes, etc.\par - Clean and disinfect your home to remove germs: practice routine cleaning of frequently touched surfaces (for example: tables, doorknobs, light switches, handles, desks, toilets, faucets, sinks & cell phones)\par - Avoid crowds, especially in poorly ventilated spaces. Your risk of exposure to respiratory viruses like COVID-19 may increase in crowded, closed-in settings with little air circulation if there are people in the crowd who are sick. All patients are recommended to practice social distancing and stay at least 6 feet away from others. \par - Avoid all non-essential travel including plane trips, and especially avoid embarking on cruise ships.\par -If COVID-19 is spreading in your community, take extra measures to put distance between yourself and other people to further reduce your risk of being exposed to this new virus.\par -Stay home as much as possible.\par - Consider ways of getting food brought to your house through family, social, or commercial networks\par -Be aware that the virus has been known to live in the air up to 3 hours post exposure, cardboard up to 24 hours post exposure, copper up to 4 hours post exposure, steel and plastic up to 2-3 days post exposure. Risk of transmission from these surfaces are affected by many variables.\par COVID-19 precautionary Immune Support Recommendations:\par -OTC Vitamin C 500mg BID \par -OTC Quercetin 250-500mg BID \par -OTC Zinc 75-100mg per day \par -OTC Melatonin 1 or 2mg a night \par -OTC Vitamin D 1-4000mg per day \par -OTC Tonic Water 8oz per day\par -Water 0.5-1 gallon per day\par Asthma and COVID19:\par You need to make sure your asthma is under control. This often requires the use of inhaled corticosteroids (and sometimes oral corticosteroids). Inhaled corticosteroids do not likely reduce your immune system’s ability to fight infections, but oral corticosteroids may. It is important to use the steps above to protect yourself to limit your exposure to any respiratory virus. \par \par problem 8: health maintenance \par -received  2019 flu shot. \par -recommended strep pneumonia vaccines: Prevnar-13 vaccine, followed by Pneumo vaccine 23 one year following\par -recommended early intervention for URIs\par -recommended regular osteoporosis evaluations\par -recommended early dermatological evaluations\par -recommended after the age of 50 to the age of 70, colonoscopy every 5 years \par \par Follow up in 4 months with full spirometry and NiOx testing\par He is encouraged to call with any changes, concerns, or questions.

## 2020-05-11 ENCOUNTER — LABORATORY RESULT (OUTPATIENT)
Age: 50
End: 2020-05-11

## 2020-05-19 ENCOUNTER — APPOINTMENT (OUTPATIENT)
Dept: BARIATRICS | Facility: CLINIC | Age: 50
End: 2020-05-19

## 2020-06-12 DIAGNOSIS — R79.89 OTHER SPECIFIED ABNORMAL FINDINGS OF BLOOD CHEMISTRY: ICD-10-CM

## 2020-06-15 ENCOUNTER — NON-APPOINTMENT (OUTPATIENT)
Age: 50
End: 2020-06-15

## 2020-06-15 ENCOUNTER — APPOINTMENT (OUTPATIENT)
Dept: CARDIOLOGY | Facility: CLINIC | Age: 50
End: 2020-06-15
Payer: MEDICAID

## 2020-06-15 VITALS
SYSTOLIC BLOOD PRESSURE: 130 MMHG | WEIGHT: 222 LBS | BODY MASS INDEX: 37.9 KG/M2 | RESPIRATION RATE: 16 BRPM | HEIGHT: 64 IN | DIASTOLIC BLOOD PRESSURE: 90 MMHG | HEART RATE: 51 BPM

## 2020-06-15 PROCEDURE — 93000 ELECTROCARDIOGRAM COMPLETE: CPT

## 2020-06-15 PROCEDURE — 99213 OFFICE O/P EST LOW 20 MIN: CPT

## 2020-07-13 LAB
25(OH)D3 SERPL-MCNC: 48.3 NG/ML
ALBUMIN SERPL ELPH-MCNC: 4.3 G/DL
ALP BLD-CCNC: 92 U/L
ALT SERPL-CCNC: 21 U/L
ANION GAP SERPL CALC-SCNC: 13 MMOL/L
AST SERPL-CCNC: 24 U/L
BASOPHILS # BLD AUTO: 0.06 K/UL
BASOPHILS NFR BLD AUTO: 0.9 %
BILIRUB SERPL-MCNC: 0.8 MG/DL
BUN SERPL-MCNC: 24 MG/DL
CALCIUM SERPL-MCNC: 9.5 MG/DL
CHLORIDE SERPL-SCNC: 102 MMOL/L
CHOLEST SERPL-MCNC: 184 MG/DL
CHOLEST/HDLC SERPL: 4 RATIO
CO2 SERPL-SCNC: 24 MMOL/L
CREAT SERPL-MCNC: 1.16 MG/DL
EOSINOPHIL # BLD AUTO: 0.68 K/UL
EOSINOPHIL NFR BLD AUTO: 9.7 %
ESTIMATED AVERAGE GLUCOSE: 123 MG/DL
FOLATE SERPL-MCNC: >20 NG/ML
GLUCOSE SERPL-MCNC: 81 MG/DL
HBA1C MFR BLD HPLC: 5.9 %
HCT VFR BLD CALC: 46.1 %
HDLC SERPL-MCNC: 46 MG/DL
HGB BLD-MCNC: 14.7 G/DL
IMM GRANULOCYTES NFR BLD AUTO: 0.1 %
IRON SATN MFR SERPL: 28 %
IRON SERPL-MCNC: 87 UG/DL
LDLC SERPL CALC-MCNC: 118 MG/DL
LYMPHOCYTES # BLD AUTO: 1.96 K/UL
LYMPHOCYTES NFR BLD AUTO: 28 %
MAN DIFF?: NORMAL
MCHC RBC-ENTMCNC: 25.5 PG
MCHC RBC-ENTMCNC: 31.9 GM/DL
MCV RBC AUTO: 79.9 FL
MONOCYTES # BLD AUTO: 0.57 K/UL
MONOCYTES NFR BLD AUTO: 8.1 %
NEUTROPHILS # BLD AUTO: 3.73 K/UL
NEUTROPHILS NFR BLD AUTO: 53.2 %
PLATELET # BLD AUTO: 205 K/UL
POTASSIUM SERPL-SCNC: 4.5 MMOL/L
PROT SERPL-MCNC: 6.5 G/DL
RBC # BLD: 5.77 M/UL
RBC # FLD: 14.6 %
SODIUM SERPL-SCNC: 139 MMOL/L
TIBC SERPL-MCNC: 308 UG/DL
TRIGL SERPL-MCNC: 99 MG/DL
TSH SERPL-ACNC: 1.61 UIU/ML
UIBC SERPL-MCNC: 221 UG/DL
VIT A SERPL-MCNC: 52.3 UG/DL
VIT B1 SERPL-MCNC: 154.1 NMOL/L
VIT B12 SERPL-MCNC: >2000 PG/ML
VIT B6 SERPL-MCNC: 44.3 UG/L
WBC # FLD AUTO: 7.01 K/UL
ZINC SERPL-MCNC: 80 UG/DL

## 2020-07-14 ENCOUNTER — APPOINTMENT (OUTPATIENT)
Dept: UROLOGY | Facility: CLINIC | Age: 50
End: 2020-07-14
Payer: MEDICAID

## 2020-07-14 VITALS — TEMPERATURE: 97.3 F

## 2020-07-14 DIAGNOSIS — Z86.39 PERSONAL HISTORY OF OTHER ENDOCRINE, NUTRITIONAL AND METABOLIC DISEASE: ICD-10-CM

## 2020-07-14 DIAGNOSIS — R35.0 FREQUENCY OF MICTURITION: ICD-10-CM

## 2020-07-14 PROCEDURE — 99203 OFFICE O/P NEW LOW 30 MIN: CPT

## 2020-07-14 NOTE — PHYSICAL EXAM
[General Appearance - Well Developed] : well developed [Normal Appearance] : normal appearance [General Appearance - Well Nourished] : well nourished [Edema] : no peripheral edema [General Appearance - In No Acute Distress] : no acute distress [Well Groomed] : well groomed [Respiration, Rhythm And Depth] : normal respiratory rhythm and effort [Exaggerated Use Of Accessory Muscles For Inspiration] : no accessory muscle use [Abdomen Tenderness] : non-tender [Abdomen Soft] : soft [Costovertebral Angle Tenderness] : no ~M costovertebral angle tenderness [Urethral Meatus] : meatus normal [Urinary Bladder Findings] : the bladder was normal on palpation [Scrotum] : the scrotum was normal [Testes Mass (___cm)] : there were no testicular masses [No Prostate Nodules] : no prostate nodules [Normal Station and Gait] : the gait and station were normal for the patient's age [] : no rash [No Focal Deficits] : no focal deficits [Oriented To Time, Place, And Person] : oriented to person, place, and time [Affect] : the affect was normal [Not Anxious] : not anxious [Mood] : the mood was normal [No Palpable Adenopathy] : no palpable adenopathy

## 2020-07-16 LAB
APPEARANCE: CLEAR
BACTERIA UR CULT: NORMAL
BACTERIA: NEGATIVE
BILIRUBIN URINE: NEGATIVE
BLOOD URINE: NEGATIVE
COLOR: COLORLESS
GLUCOSE QUALITATIVE U: ABNORMAL
HYALINE CASTS: 0 /LPF
KETONES URINE: NEGATIVE
LEUKOCYTE ESTERASE URINE: NEGATIVE
MICROSCOPIC-UA: NORMAL
NITRITE URINE: NEGATIVE
PH URINE: 6
PROTEIN URINE: NEGATIVE
RED BLOOD CELLS URINE: 0 /HPF
SPECIFIC GRAVITY URINE: 1
SQUAMOUS EPITHELIAL CELLS: 0 /HPF
UROBILINOGEN URINE: NORMAL
WHITE BLOOD CELLS URINE: 0 /HPF

## 2020-07-16 NOTE — REVIEW OF SYSTEMS
[Eyesight Problems] : eyesight problems [Wake up at night to urinate  How many times?  ___] : wakes up to urinate [unfilled] times during the night [Negative] : Heme/Lymph [Heart Rate Is Fast] : the heart rate was not fast [FreeTextEntry2] : high blood pressure

## 2020-07-16 NOTE — LETTER BODY
[FreeTextEntry1] : Isra Maynard MD\par [address]\[place] [address]\par \par Dear Dr. Maynard,\par \par Brandt Villegas presents to the office today.  He is a 49-year-old man who is here today with a primary complaint of lower urinary tract symptoms.  His symptoms are mostly irritative in nature including urinary frequency and urgency.  He denies any urge associated urinary incontinence.  He also has nocturia.  He says he has had longstanding symptoms going back to when he was in his early 20s.  He generally has nocturia at least twice per night.  He has been diagnosed with sleep apnea in the past and has undergone a gastric bypass surgery and has lost about 70 pounds as a result.  \par \par The patient denies any current symptoms of abdominal pain or flank pain.  He has no history of kidney stones.  He denies any fevers or chills and denies nausea or vomiting.  He says that he drinks about 8 glasses of water per day which has been recommended due to his gastric bypass surgery.  Medical history is also remarkable for type 2 diabetes.\par \par We discussed his irritative voiding symptoms today.  I have recommended urinalysis and culture as some initial evaluation steps.  I will be in touch with him with those results once available.  I do not believe he is likely to have an infection at this time but if he does I will certainly treat him with antibiotics based on the culture results.\par \par Most likely I believe he has overactivity of his bladder.  His primary complaint at this point is related to nocturia.  As he does drink fluids throughout the day and up until bedtime, I initially advised him to try simply to reduce his nighttime fluid intake as this may make his nocturia more manageable such that he does not require any medical therapy.  We reviewed this plan in detail and also discussed potential additional option such as anticholinergic medication to help minimize the overactivity.  We also reviewed the fact that diabetes is closely associated with overactivity of the bladder and that additional weight loss and improve glucose control would also likely be beneficial to minimizing his voiding complaints.\par \par He will try this initial plan and let me know if he does not see sufficient improvement.  I will recommend anticholinergics if he does not see enough change with lifestyle modification.\par \par Sincerely,\par \par \par \par \par Brandt Hu MD, FACS\par  of Urology\par Residency \par [facility]\par \par [facility]\par Director of Robotics and Minimally Invasive Surgery\par [address] [place] [address]\par [place] [id number]\par P: [phone]\par F: [phone]\par [facility]\par \par

## 2020-09-04 ENCOUNTER — APPOINTMENT (OUTPATIENT)
Dept: PULMONOLOGY | Facility: CLINIC | Age: 50
End: 2020-09-04
Payer: MEDICAID

## 2020-09-04 ENCOUNTER — MED ADMIN CHARGE (OUTPATIENT)
Age: 50
End: 2020-09-04

## 2020-09-04 VITALS
HEART RATE: 56 BPM | TEMPERATURE: 97.1 F | BODY MASS INDEX: 38.45 KG/M2 | OXYGEN SATURATION: 98 % | RESPIRATION RATE: 16 BRPM | DIASTOLIC BLOOD PRESSURE: 82 MMHG | WEIGHT: 217 LBS | HEIGHT: 63 IN | SYSTOLIC BLOOD PRESSURE: 120 MMHG

## 2020-09-04 PROCEDURE — G0008: CPT

## 2020-09-04 PROCEDURE — 99214 OFFICE O/P EST MOD 30 MIN: CPT | Mod: 25

## 2020-09-04 PROCEDURE — 90682 RIV4 VACC RECOMBINANT DNA IM: CPT

## 2020-09-04 PROCEDURE — 95012 NITRIC OXIDE EXP GAS DETER: CPT

## 2020-09-04 NOTE — ADDENDUM
[FreeTextEntry1] : Documented by Kali Carver acting as a scribe for Dr. Rufus Min on 09/04/2020.\par \par All medical record entries made by the Scribe were at my, Dr. Rufus Min's, direction and personally dictated by me on 09/04/2020 . I have reviewed the chart and agree that the record accurately reflects my personal performance of the history, physical exam, assessment and plan. I have also personally directed, reviewed, and agree with the discharge instructions. \par  \par \par \par

## 2020-09-04 NOTE — PHYSICAL EXAM
[General Appearance - Well Developed] : well developed [Well Groomed] : well groomed [Normal Appearance] : normal appearance [No Deformities] : no deformities [General Appearance - Well Nourished] : well nourished [General Appearance - In No Acute Distress] : no acute distress [Eyelids - No Xanthelasma] : the eyelids demonstrated no xanthelasmas [Normal Conjunctiva] : the conjunctiva exhibited no abnormalities [Neck Appearance] : the appearance of the neck was normal [Normal Oropharynx] : normal oropharynx [Neck Cervical Mass (___cm)] : no neck mass was observed [Jugular Venous Distention Increased] : there was no jugular-venous distention [Thyroid Nodule] : there were no palpable thyroid nodules [Thyroid Diffuse Enlargement] : the thyroid was not enlarged [Heart Rate And Rhythm] : heart rate and rhythm were normal [Heart Sounds] : normal S1 and S2 [Murmurs] : no murmurs present [Exaggerated Use Of Accessory Muscles For Inspiration] : no accessory muscle use [Respiration, Rhythm And Depth] : normal respiratory rhythm and effort [Auscultation Breath Sounds / Voice Sounds] : lungs were clear to auscultation bilaterally [Abdomen Soft] : soft [Abdomen Tenderness] : non-tender [Abnormal Walk] : normal gait [Abdomen Mass (___ Cm)] : no abdominal mass palpated [Gait - Sufficient For Exercise Testing] : the gait was sufficient for exercise testing [Nail Clubbing] : no clubbing of the fingernails [Cyanosis, Localized] : no localized cyanosis [Petechial Hemorrhages (___cm)] : no petechial hemorrhages [Skin Color & Pigmentation] : normal skin color and pigmentation [] : no rash [Skin Lesions] : no skin lesions [No Venous Stasis] : no venous stasis [No Skin Ulcers] : no skin ulcer [No Xanthoma] : no  xanthoma was observed [Deep Tendon Reflexes (DTR)] : deep tendon reflexes were 2+ and symmetric [No Focal Deficits] : no focal deficits [Sensation] : the sensory exam was normal to light touch and pinprick [Affect] : the affect was normal [Impaired Insight] : insight and judgment were intact [Oriented To Time, Place, And Person] : oriented to person, place, and time [II] : II [FreeTextEntry1] : I:E 1:3, clear

## 2020-09-04 NOTE — HISTORY OF PRESENT ILLNESS
[FreeTextEntry1] : Mr. Villegas is a 49 year old male presenting to the office for a follow up visit for asthma, chronic cough, GERD, NADINE, postnasal drip, sleep-disordered breathing, snoring, low vitamin D and shortness of breath. He is presenting today s/p bariatric surgery. His chief complaint is \par \par -he notes generally feeling well\par -he notes sleeping well\par -he notes regular bowel movements \par -he notes weight is down\par -he notes exercising walking\par -he denies cough\par -he denies wheeze\par -he notes reflux is controlled\par -he notes sleeping well\par -he denies snoring\par \par -denies any headaches, nausea, vomiting, fever, chills, sweats, chest pain, chest pressure, diarrhea, constipation, dysphagia, dizziness, leg swelling, leg pain, itchy eyes, itchy ears, heartburn, reflux, or sour taste in the mouth.\par \par

## 2020-09-04 NOTE — ASSESSMENT
[FreeTextEntry1] : Mr. Villegas is a 49 year old male with a history of asthma, allergy, GERD, HTN, diabetes, obesity, who now comes in for a re-evaluation.  He is currently s/p bariatric surgery (7/2018) as a mode of weight loss, now with moderate OSAS which have improved- stable/ slowly improving \par  \par His shortness of breath (improved) is felt to be related to:\par -overweight/out of shape\par -poor breathing mechanics\par -asthma\par -cardiac at risk \par \par History of a chronic cough felt to be related to:\par -asthma\par -post nasal drip syndrome \par -secondary GERD \par \par problem 1: asthma (stable)\par -MCT reveals positivity for asthma\par -continue Breo Ellipta 200 1 inhalation QD\par -continue Ventolin as a rescue inhaler 2 inhalations up to QiD \par -Inhaler technique reviewed as well as oral hygiene techniques reviewed with patient. Avoidance of cold air, extremes of temperature, rescue inhaler should be used before exercise. Order of medication reviewed with patient. Recommended use of a cool mist humidifier in the bedroom. Instructed to gargle and spit after inhaler use. \par -Asthma is  believed to be caused by inherited (genetic) and environmental factor, but its exact cause is unknown. Asthma may be triggered by allergens, lung infections, or irritants in the air. Asthma triggers are different for each person\par \par problem 2: post nasal drip syndrome/allergies\par -continue Qnasl 1 inhalation per nostril BID\par -continue Clarinex 5 mg QHS \par -continue Olopatadine (0.6) 1 sniff/nostril BID\par -Environmental measures for allergies were encouraged including mattress and pillow cover, air purifier, and environmental controls.\par \par problem 3: r/o allergen profile\par -allergy blood work: food IgE level, asthma profile, eosinophil level, IgE level, and vitamin D level (noncompliant)\par \par problem 4: GERD\par  -continue Pepcid 40 mg QHS \par -Rule of 2s: avoid eating too much, eating too late, eating too spicy, eating two hours before bed\par -Things to avoid including overeating, spicy foods, tight clothing, eating within three hours of bed, this list is not all inclusive. \par -For treatment of reflux, possible options discussed including diet control, H2 blockers, PPIs, as well as coating motility agents discussed as treatment options. Timing of meals and proximity of last meal to sleep were discussed. If symptoms persist, a formal gastrointestinal evaluation is needed.\par \par problem 5: positive NADINE (moderate) -Noncompliant\par -unable to tolerate CPAP\par -s/p repeat Home sleep study - now moderate \par -secondary sleep study reveals CPAP of 14 cmH2O - follow up study to be complete for re-evaluation\par -recommended to consider Oral Appliance \par -recommended to use "Chin Strap" \par -reassess for dental device when at his target weight\par -Sleep apnea is associated with adverse clinical consequences which an affect most organ systems.  Cardiovascular disease risk includes arrhythmias, atrial fibrillation, hypertension, coronary artery disease, and stroke. Metabolic disorders include diabetes type 2, non-alcoholic fatty liver disease. Mood disorder especially depression; and cognitive decline especially in the elderly. Associations with  chronic reflux/Anguiano’s esophagus some but not all inclusive. \par -Reasons to assess this include arousal consistent with hypopnea; respiratory events most prominent in REM sleep or supine position; therefore sleep staging and body position are important for accurate diagnosis and estimation of AHI.\par -Treatment options discussed including CPAP/BiPAP machine, oral appliance, ProVent therapy, Oxy-Aid by Respitec, new technologies, or positional sleep.Recommended use of the CPAP machine for moderate (AHI >15), moderate to severe (AHI 15-30) and severe patients (AHI > 30). Recommended weight loss which can reduce AHI especially in weight loss of greater than 5% of BMI. Positional sleep is recommended in those with low AHI, low-moderate BMI, and younger age. For severe sleep apnea, the hypoglossal nerve stimulator was recommended as well. \par \par problem 6: obesity/out of shape -(improved)\par -recommended to read "[employer]" , Engine 2" and "Obesity Code" \par -Weight loss, exercise, and diet control were discussed and are highly encouraged. Treatment options were given such as, aqua therapy, and contacting a nutritionist. Recommended to use the elliptical, stationary bike, less use of treadmill.  Obesity is associated with worsening asthma, shortness of breath, and potential for cardiac disease, diabetes, and other underlying medical conditions.\par \par problem 7: poor breathing mechanics\par -Proper breathing techniques were reviewed with an emphasis of exhalation. Patient instructed to breath in for 1 second and out for four seconds. Patient was encouraged to not talk while walking. \par \par Problem 8a: Health Maintenance/COVID19 Precautions \par - Clean your hands often. Wash your hands often with soap and water for at least 20 seconds, especially after blowing your nose, coughing, or sneezing, or having been in a public place.\par - If soap and water are not available, use a hand  that contains at least 60% alcohol.\par - To the extent possible, avoid touching high-touch surfaces in public places - elevator buttons, door handles, handrails, handshaking with people, etc. Use a tissue or your sleeve to cover your hand or finger if you must touch something.\par - Wash your hands after touching surfaces in public places.\par - Avoid touching your face, nose, eyes, etc.\par - Clean and disinfect your home to remove germs: practice routine cleaning of frequently touched surfaces (for example: tables, doorknobs, light switches, handles, desks, toilets, faucets, sinks & cell phones)\par - Avoid crowds, especially in poorly ventilated spaces. Your risk of exposure to respiratory viruses like COVID-19 may increase in crowded, closed-in settings with little air circulation if there are people in the crowd who are sick. All patients are recommended to practice social distancing and stay at least 6 feet away from others. \par - Avoid all non-essential travel including plane trips, and especially avoid embarking on cruise ships.\par -If COVID-19 is spreading in your community, take extra measures to put distance between yourself and other people to further reduce your risk of being exposed to this new virus.\par -Stay home as much as possible.\par - Consider ways of getting food brought to your house through family, social, or commercial networks\par -Be aware that the virus has been known to live in the air up to 3 hours post exposure, cardboard up to 24 hours post exposure, copper up to 4 hours post exposure, steel and plastic up to 2-3 days post exposure. Risk of transmission from these surfaces are affected by many variables.\par COVID-19 precautionary Immune Support Recommendations:\par -OTC Vitamin C 500mg BID \par -OTC Quercetin 250-500mg BID \par -OTC Zinc 75-100mg per day \par -OTC Melatonin 1 or 2mg a night \par -OTC Vitamin D 1-4000mg per day \par -OTC Tonic Water 8oz per day\par -Water 0.5-1 gallon per day\par Asthma and COVID19:\par You need to make sure your asthma is under control. This often requires the use of inhaled corticosteroids (and sometimes oral corticosteroids). Inhaled corticosteroids do not likely reduce your immune system’s ability to fight infections, but oral corticosteroids may. It is important to use the steps above to protect yourself to limit your exposure to any respiratory virus. \par \par problem 8: health maintenance \par -received  2020 flu shot. (9/4/2020)\par -recommended strep pneumonia vaccines: Prevnar-13 vaccine, followed by Pneumo vaccine 23 one year following\par -recommended early intervention for URIs\par -recommended regular osteoporosis evaluations\par -recommended early dermatological evaluations\par -recommended after the age of 50 to the age of 70, colonoscopy every 5 years \par \par Follow up in 4 months with full spirometry and NiOx testing\par He is encouraged to call with any changes, concerns, or questions.

## 2020-09-10 NOTE — H&P PST ADULT - BACK
Patient discharged with v/s stable. Written and verbal after care instructions 
given and explained. 

Patient verbalized understanding. Ambulatory with steady gait. All questions 
addressed prior to discharge. Advised to follow up with PMD. PT PROVIDED WITH 
BUS PASS AND SANDBERTHAICH/MILK. No deformity or limitation of movement

## 2020-09-15 ENCOUNTER — APPOINTMENT (OUTPATIENT)
Dept: CARDIOLOGY | Facility: CLINIC | Age: 50
End: 2020-09-15
Payer: MEDICAID

## 2020-09-15 VITALS
WEIGHT: 216 LBS | RESPIRATION RATE: 16 BRPM | DIASTOLIC BLOOD PRESSURE: 78 MMHG | SYSTOLIC BLOOD PRESSURE: 128 MMHG | BODY MASS INDEX: 38.27 KG/M2 | OXYGEN SATURATION: 97 % | HEART RATE: 50 BPM | HEIGHT: 63 IN

## 2020-09-15 PROCEDURE — 99213 OFFICE O/P EST LOW 20 MIN: CPT

## 2020-09-15 PROCEDURE — 93922 UPR/L XTREMITY ART 2 LEVELS: CPT

## 2020-09-15 RX ORDER — LOSARTAN POTASSIUM 100 MG/1
TABLET, FILM COATED ORAL
Refills: 0 | Status: DISCONTINUED | COMMUNITY
End: 2020-09-15

## 2020-12-28 DIAGNOSIS — Z01.812 ENCOUNTER FOR PREPROCEDURAL LABORATORY EXAMINATION: ICD-10-CM

## 2021-01-08 ENCOUNTER — APPOINTMENT (OUTPATIENT)
Dept: PULMONOLOGY | Facility: CLINIC | Age: 51
End: 2021-01-08
Payer: MEDICAID

## 2021-01-08 ENCOUNTER — APPOINTMENT (OUTPATIENT)
Dept: CARDIOLOGY | Facility: CLINIC | Age: 51
End: 2021-01-08
Payer: MEDICAID

## 2021-01-08 VITALS
BODY MASS INDEX: 37.56 KG/M2 | OXYGEN SATURATION: 98 % | SYSTOLIC BLOOD PRESSURE: 134 MMHG | HEIGHT: 63 IN | DIASTOLIC BLOOD PRESSURE: 84 MMHG | HEART RATE: 51 BPM | RESPIRATION RATE: 16 BRPM | WEIGHT: 212 LBS

## 2021-01-08 VITALS
DIASTOLIC BLOOD PRESSURE: 70 MMHG | BODY MASS INDEX: 37.74 KG/M2 | WEIGHT: 213 LBS | HEART RATE: 63 BPM | OXYGEN SATURATION: 98 % | HEIGHT: 63 IN | TEMPERATURE: 97.6 F | RESPIRATION RATE: 16 BRPM | SYSTOLIC BLOOD PRESSURE: 110 MMHG

## 2021-01-08 PROCEDURE — 94729 DIFFUSING CAPACITY: CPT

## 2021-01-08 PROCEDURE — ZZZZZ: CPT

## 2021-01-08 PROCEDURE — 93000 ELECTROCARDIOGRAM COMPLETE: CPT

## 2021-01-08 PROCEDURE — 93306 TTE W/DOPPLER COMPLETE: CPT

## 2021-01-08 PROCEDURE — 94010 BREATHING CAPACITY TEST: CPT

## 2021-01-08 PROCEDURE — 99214 OFFICE O/P EST MOD 30 MIN: CPT | Mod: 25

## 2021-01-08 PROCEDURE — 99072 ADDL SUPL MATRL&STAF TM PHE: CPT

## 2021-01-08 PROCEDURE — 99213 OFFICE O/P EST LOW 20 MIN: CPT

## 2021-01-08 NOTE — HISTORY OF PRESENT ILLNESS
[FreeTextEntry1] : Mr. Villegas is a 50 year old male presenting to the office for a follow up visit for asthma, chronic cough, GERD, NADINE, postnasal drip, sleep-disordered breathing, snoring, low vitamin D and shortness of breath. He is presenting today s/p bariatric surgery. His chief complaint is \par -He notes feeling stressed due to Covid19\par -He notes his energy level is good \par -He notes getting enough sleep,getting 6-7 hours per night \par -He notes walking for exercise \par -He notes his bowels are regular \par -He notes his sense of smell and taste are regular \par - He notes needing to go see a new endocrinologist due to insurance  (Dr. Gaitan) \par - He notes wanting to lose weight \par - No new medications, vitamins or supplements \par -He denies wheezing \par - He denies snoring \par \par - denies any headaches, nausea, vomiting, fever, chills, sweats, chest pain, chest pressure, diarrhea, constipation, dysphagia, dizziness, leg swelling, leg pain, itchy eyes, itchy ears, heartburn, reflux, or sour taste in the mouth.

## 2021-01-08 NOTE — ADDENDUM
[FreeTextEntry1] : Documented by Todd Slaughter acting as a scribe for Dr. Rufus Min on (01/08/2021).\par \par All medical record entries made by the Scribe were at my, Dr. Rufus Min's, direction and personally dictated by me on (01/08/2021). I have reviewed the chart and agree that the record accurately reflects my personal performance of the history, physical exam, assessment and plan. I have also personally directed, reviewed, and agree with the discharge instructions. \par  \par \par \par

## 2021-01-08 NOTE — PROCEDURE
[FreeTextEntry1] : Full PFT revealed normal flows, with a FEV1 of 3.22L, which is 107% of predicted, normal lung volumes, and a diffusion of 24.6, which is 94% of predicted, with a normal flow volume loop and a faint inspiratory limb

## 2021-01-12 ENCOUNTER — NON-APPOINTMENT (OUTPATIENT)
Age: 51
End: 2021-01-12

## 2021-01-21 ENCOUNTER — RESULT CHARGE (OUTPATIENT)
Age: 51
End: 2021-01-21

## 2021-01-21 ENCOUNTER — APPOINTMENT (OUTPATIENT)
Dept: ENDOCRINOLOGY | Facility: CLINIC | Age: 51
End: 2021-01-21
Payer: MEDICAID

## 2021-01-21 VITALS
HEIGHT: 63 IN | SYSTOLIC BLOOD PRESSURE: 130 MMHG | BODY MASS INDEX: 38.98 KG/M2 | HEART RATE: 60 BPM | OXYGEN SATURATION: 97 % | DIASTOLIC BLOOD PRESSURE: 80 MMHG | WEIGHT: 220 LBS

## 2021-01-21 DIAGNOSIS — D35.2 BENIGN NEOPLASM OF PITUITARY GLAND: ICD-10-CM

## 2021-01-21 PROCEDURE — 99204 OFFICE O/P NEW MOD 45 MIN: CPT | Mod: 25

## 2021-01-21 PROCEDURE — 82962 GLUCOSE BLOOD TEST: CPT

## 2021-01-21 PROCEDURE — 99072 ADDL SUPL MATRL&STAF TM PHE: CPT

## 2021-01-22 LAB
CREAT SPEC-SCNC: 114 MG/DL
MICROALBUMIN 24H UR DL<=1MG/L-MCNC: <1.2 MG/DL
MICROALBUMIN/CREAT 24H UR-RTO: NORMAL MG/G

## 2021-01-24 NOTE — ASSESSMENT
[FreeTextEntry1] : 50 year old male with past medical history of NADINE, Diabetes Mellitus Type 2, s/p Gastric Sleeve who presents for management of his diabetes\par \par 1. DM 2- uncontrolled, uncomplicated\par Patient counseled on the importance of diabetic control and complications. Patient's diet and the necessary changes discussed. Reviewed over glycemic goals. Discussed other options for diabetes control. \par \par Dec Steglatro 5 mg QD\par Check fsg\par Cont diabetic diet\par Cont exercise\par will check microalbumin/cre\par Opthalmology Visit up to date\par Foot Exam up to date\par \par 2. HLD- elevated\par Cont Rosuvastatin and Zetia\par \par 3. Pituitary Microadenoma\par Repeat MRI\par Will repeat labs after repeat imaging\par \par Follow up in 3 months\par \par \par Follow up in 3 months\par \par \par

## 2021-01-24 NOTE — REASON FOR VISIT
[Initial Evaluation] : an initial evaluation [DM Type 2] : DM Type 2 [Pituitary Evaluation/ Disorder] : pituitary evaluation/disorder

## 2021-01-24 NOTE — PHYSICAL EXAM
[Alert] : alert [Well Nourished] : well nourished [No Acute Distress] : no acute distress [Well Developed] : well developed [Normal Sclera/Conjunctiva] : normal sclera/conjunctiva [EOMI] : extra ocular movement intact [No Proptosis] : no proptosis [Normal Oropharynx] : the oropharynx was normal [Thyroid Not Enlarged] : the thyroid was not enlarged [No Thyroid Nodules] : no palpable thyroid nodules [No Respiratory Distress] : no respiratory distress [No Accessory Muscle Use] : no accessory muscle use [Clear to Auscultation] : lungs were clear to auscultation bilaterally [Normal S1, S2] : normal S1 and S2 [Normal Rate] : heart rate was normal [Regular Rhythm] : with a regular rhythm [No Edema] : no peripheral edema [Pedal Pulses Normal] : the pedal pulses are present [Normal Bowel Sounds] : normal bowel sounds [Not Tender] : non-tender [Not Distended] : not distended [Soft] : abdomen soft [Normal Anterior Cervical Nodes] : no anterior cervical lymphadenopathy [Normal Posterior Cervical Nodes] : no posterior cervical lymphadenopathy [No Spinal Tenderness] : no spinal tenderness [Spine Straight] : spine straight [No Stigmata of Cushings Syndrome] : no stigmata of Cushings Syndrome [Normal Gait] : normal gait [Normal Strength/Tone] : muscle strength and tone were normal [No Rash] : no rash [Normal Reflexes] : deep tendon reflexes were 2+ and symmetric [No Tremors] : no tremors [Oriented x3] : oriented to person, place, and time [Normal] : normal [Full ROM] : with full range of motion [Acanthosis Nigricans] : no acanthosis nigricans [Diminished Throughout Both Feet] : normal tactile sensation with monofilament testing throughout both feet

## 2021-01-24 NOTE — HISTORY OF PRESENT ILLNESS
[FreeTextEntry1] : Mr. FABIANO BENOIT  is a 50 year old male with past medical history of NADINE, Diabetes Mellitus Type 2, s/p Gastric Sleeve, Pituitary microadenoma who presents for management of his diabetes. Patient lost 70 lbs after his surgery. He was off diabetes medications and Steglatro wa added when he started gaining weight. Patient denies any history of diabetic retinopathy, nephropathy or neuropathy. He denies any blurry vision, polyuria, polydipsia and numbness/tingling in the extremities.\par \par \par POCT Glucose: 78 mg/dL\par POCT Hga1c: %\par \par \par Diabetes first diagnosed: 2004\par Type: 2\par \par Current diabetic regimen:\par Metformin (stopped)\par Steglatro 15 mg QD\par \par Other relevant medications:\par Losartan\par Rosuvastatin 20 mg QD\par Zetia\par Self monitoring blood glucose : 1x times per day:\par \par SMBG:\par Pre-breakfast: \par Pre-lunch:\par Pre-dinner:\par bedtime:\par \par Hypoglycemia: denies\par \par \par Diet:\par Breakfast:\par Lunch:\par Dinner:\par Snacks:\par \par Exercise:\par \par Urine micro:NA\par lipid profile:  (1/2021)\par last hba1c:5.7% (1/2021), 5.9% (6/2020)\par eye exam: none\par diabetic foot exam/podiatry:NA\par \par Re Pediatric Microadenoma\par He was incidentally found to have the microadenoma when he was evaluated for low testosterone. Last MRI was in 2018. There was a finding of a 3.6 x 3.1 mm\par \par \par

## 2021-02-05 ENCOUNTER — APPOINTMENT (OUTPATIENT)
Dept: MRI IMAGING | Facility: CLINIC | Age: 51
End: 2021-02-05
Payer: MEDICAID

## 2021-02-05 ENCOUNTER — OUTPATIENT (OUTPATIENT)
Dept: OUTPATIENT SERVICES | Facility: HOSPITAL | Age: 51
LOS: 1 days | End: 2021-02-05
Payer: MEDICAID

## 2021-02-05 DIAGNOSIS — Z98.890 OTHER SPECIFIED POSTPROCEDURAL STATES: Chronic | ICD-10-CM

## 2021-02-05 DIAGNOSIS — Z96.652 PRESENCE OF LEFT ARTIFICIAL KNEE JOINT: Chronic | ICD-10-CM

## 2021-02-05 DIAGNOSIS — D35.2 BENIGN NEOPLASM OF PITUITARY GLAND: ICD-10-CM

## 2021-02-05 PROCEDURE — 70553 MRI BRAIN STEM W/O & W/DYE: CPT

## 2021-02-05 PROCEDURE — 70553 MRI BRAIN STEM W/O & W/DYE: CPT | Mod: 26

## 2021-02-05 PROCEDURE — A9585: CPT

## 2021-02-23 ENCOUNTER — NON-APPOINTMENT (OUTPATIENT)
Age: 51
End: 2021-02-23

## 2021-02-23 ENCOUNTER — APPOINTMENT (OUTPATIENT)
Dept: OPHTHALMOLOGY | Facility: CLINIC | Age: 51
End: 2021-02-23
Payer: MEDICAID

## 2021-02-23 PROCEDURE — 99072 ADDL SUPL MATRL&STAF TM PHE: CPT

## 2021-02-23 PROCEDURE — 92004 COMPRE OPH EXAM NEW PT 1/>: CPT

## 2021-05-10 PROBLEM — I73.9 CLAUDICATION: Status: ACTIVE | Noted: 2020-09-16

## 2021-05-10 NOTE — REASON FOR VISIT
[CV Risk Factors and Non-Cardiac Disease] : CV risk factors and non-cardiac disease [FreeTextEntry1] : This is a 50-year-old male with past medical history significant for status post gastric sleeve surgery, non-insulin-dependent diabetes mellitus, reactive airway disease, sleep apnea, asthma, comes in for cardiac follow up evaluation.

## 2021-05-10 NOTE — PHYSICAL EXAM
[Well Developed] : well developed [Well Nourished] : well nourished [No Acute Distress] : no acute distress [Normal Venous Pressure] : normal venous pressure [No Carotid Bruit] : no carotid bruit [Normal S1, S2] : normal S1, S2 [No Rub] : no rub [Murmur] : murmur [Apical Thrill] : no thrill palpable at the apex [S3] : no S3 [S4] : no S4 [Click] : no click [Distant] : the heart sounds were ~L not distant [Pericardial Rub] : no pericardial rub [Right Carotid Bruit] : no bruit heard over the right carotid [Left Carotid Bruit] : no bruit heard over the left carotid [Right Femoral Bruit] : no bruit heard over the right femoral artery [Left Femoral Bruit] : no bruit heard over the left femoral artery [Clear Lung Fields] : clear lung fields [Good Air Entry] : good air entry [No Respiratory Distress] : no respiratory distress  [Soft] : abdomen soft [Non Tender] : non-tender [No Masses/organomegaly] : no masses/organomegaly [Normal Bowel Sounds] : normal bowel sounds [Normal Gait] : normal gait [No Edema] : no edema [No Cyanosis] : no cyanosis [No Clubbing] : no clubbing [No Varicosities] : no varicosities [No Rash] : no rash [No Skin Lesions] : no skin lesions [Moves all extremities] : moves all extremities [No Focal Deficits] : no focal deficits [Normal Speech] : normal speech [Alert and Oriented] : alert and oriented [Normal memory] : normal memory [General Appearance - Well Developed] : well developed [Normal Appearance] : normal appearance [Well Groomed] : well groomed [General Appearance - Well Nourished] : well nourished [No Deformities] : no deformities [General Appearance - In No Acute Distress] : no acute distress [Normal Conjunctiva] : the conjunctiva exhibited no abnormalities [Normal Oral Mucosa] : normal oral mucosa [Normal Oropharynx] : normal oropharynx [Normal Jugular Venous A Waves Present] : normal jugular venous A waves present [Normal Jugular Venous V Waves Present] : normal jugular venous V waves present [No Jugular Venous Milian A Waves] : no jugular venous milian A waves [Respiration, Rhythm And Depth] : normal respiratory rhythm and effort [Exaggerated Use Of Accessory Muscles For Inspiration] : no accessory muscle use [Auscultation Breath Sounds / Voice Sounds] : lungs were clear to auscultation bilaterally [Bowel Sounds] : normal bowel sounds [Abdomen Soft] : soft [Abnormal Walk] : normal gait [Gait - Sufficient For Exercise Testing] : the gait was sufficient for exercise testing [Nail Clubbing] : no clubbing of the fingernails [Cyanosis, Localized] : no localized cyanosis [Skin Color & Pigmentation] : normal skin color and pigmentation [Skin Turgor] : normal skin turgor [] : no rash [No Venous Stasis] : no venous stasis [Skin Lesions] : no skin lesions [Oriented To Time, Place, And Person] : oriented to person, place, and time [Affect] : the affect was normal [Mood] : the mood was normal [No Anxiety] : not feeling anxious [5th Left ICS - MCL] : palpated at the 5th LICS in the midclavicular line [Normal] : normal [No Precordial Heave] : no precordial heave was noted [Normal Rate] : normal [Heart Rate ___] : [unfilled] bpm [Rhythm Regular] : regular [Normal S1] : normal S1 [Normal S2] : normal S2 [No Gallop] : no gallop heard [I] : a grade 1 [2+] : left 2+ [No Abnormalities] : the abdominal aorta was not enlarged and no bruit was heard [No Pitting Edema] : no pitting edema present [Rt] : no varicose veins of the right leg [Lt] : no varicose veins of the left leg

## 2021-05-10 NOTE — DISCUSSION/SUMMARY
[FreeTextEntry1] : This is a 50-year-old male with past medical history significant for status post gastric sleeve surgery, non-insulin-dependent diabetes mellitus, reactive airway disease, sleep apnea, asthma, comes in for cardiac follow up evaluation.\par The patient had a normal exercise stress test September 9, 2019.\par The patient had a normal nuclear stress test done November 4, 2019 which demonstrated an estimated ejection fraction is 60%.\par The patient continues to lose weight.  His hemoglobin A1c has improved.\par Blood work done May 3, 2019 demonstrated total cholesterol 186, HDL 54, calculated , triglycerides of 91 mg/dL.\par The patient will continue on his current diet and exercise program.  His lipid panel should improve.  His glycemic profile is also improved significantly.  He will followup with primary care physician as well.\par \par His cardiac risk factors include borderline hypertension, hyperlipidemia, non-insulin-dependent diabetes mellitus, and history of smoking.He does get occasional dyspnea on exertion.\par He does not drink excessive caffeine or alcohol.  He has no history of rheumatic fever.\par Electrocardiogram done August of 2019 demonstrates normal sinus rhythm rate 59 beats per minute is otherwise unremarkable..\par He has lost approximately 70 pounds at the bariatric surgery.\par I have recommend statin therapy, but the patient does not wish to pursue that treatment at this time.\par He is instructed to followup the regarding his overall medical care.The patient understands that aerobic exercises must be increased to 40 minutes 4 times per week. A detailed discussion of lifestyle modification was done today. The patient has a good understanding of the diagnosis, and treatment plan. Lifestyle modification was also outlined.

## 2021-05-12 ENCOUNTER — NON-APPOINTMENT (OUTPATIENT)
Age: 51
End: 2021-05-12

## 2021-05-12 ENCOUNTER — APPOINTMENT (OUTPATIENT)
Dept: CARDIOLOGY | Facility: CLINIC | Age: 51
End: 2021-05-12

## 2021-05-12 ENCOUNTER — APPOINTMENT (OUTPATIENT)
Dept: PULMONOLOGY | Facility: CLINIC | Age: 51
End: 2021-05-12
Payer: MEDICAID

## 2021-05-12 VITALS
BODY MASS INDEX: 38.62 KG/M2 | HEART RATE: 62 BPM | RESPIRATION RATE: 16 BRPM | OXYGEN SATURATION: 98 % | HEIGHT: 63 IN | SYSTOLIC BLOOD PRESSURE: 130 MMHG | DIASTOLIC BLOOD PRESSURE: 80 MMHG | TEMPERATURE: 97 F | WEIGHT: 218 LBS

## 2021-05-12 DIAGNOSIS — I73.9 PERIPHERAL VASCULAR DISEASE, UNSPECIFIED: ICD-10-CM

## 2021-05-12 PROCEDURE — 95012 NITRIC OXIDE EXP GAS DETER: CPT

## 2021-05-12 PROCEDURE — ZZZZZ: CPT

## 2021-05-12 PROCEDURE — 94010 BREATHING CAPACITY TEST: CPT

## 2021-05-12 PROCEDURE — 99214 OFFICE O/P EST MOD 30 MIN: CPT | Mod: 25

## 2021-05-12 PROCEDURE — 99072 ADDL SUPL MATRL&STAF TM PHE: CPT

## 2021-05-12 NOTE — ASSESSMENT
[FreeTextEntry1] : Mr. Villegas is a 50 year old male with a history of asthma, allergy, GERD, HTN, diabetes, obesity, who now comes in for a re-evaluation.  He is currently s/p bariatric surgery (7/2018) as a mode of weight loss, now with moderate OSAS which have improved- stable\par  \par His shortness of breath (improved) is felt to be related to:\par -overweight/out of shape\par -poor breathing mechanics\par -asthma\par -cardiac at risk \par \par History of a chronic cough felt to be related to:\par -asthma\par -post nasal drip syndrome \par -secondary GERD \par \par problem 1: asthma (stable)\par -MCT reveals positivity for asthma\par -continue Breo Ellipta 200 1 inhalation QD\par -continue Ventolin as a rescue inhaler 2 inhalations up to QiD \par -Inhaler technique reviewed as well as oral hygiene techniques reviewed with patient. Avoidance of cold air, extremes of temperature, rescue inhaler should be used before exercise. Order of medication reviewed with patient. Recommended use of a cool mist humidifier in the bedroom. Instructed to gargle and spit after inhaler use. \par -Asthma is  believed to be caused by inherited (genetic) and environmental factor, but its exact cause is unknown. Asthma may be triggered by allergens, lung infections, or irritants in the air. Asthma triggers are different for each person\par \par problem 2: post nasal drip syndrome/allergies (quiet) \par -continue Qnasl 1 inhalation per nostril BID\par -continue Clarinex 5 mg QHS \par -continue Olopatadine (0.6) 1 sniff/nostril BID\par -Environmental measures for allergies were encouraged including mattress and pillow cover, air purifier, and environmental controls.\par \par problem 3: r/o allergen profile\par -allergy blood work: food IgE level, asthma profile, eosinophil level, IgE level, and vitamin D level (noncompliant)\par \par problem 4: GERD\par  -continue Pepcid 40 mg QHS \par -Rule of 2s: avoid eating too much, eating too late, eating too spicy, eating two hours before bed\par -Things to avoid including overeating, spicy foods, tight clothing, eating within three hours of bed, this list is not all inclusive. \par -For treatment of reflux, possible options discussed including diet control, H2 blockers, PPIs, as well as coating motility agents discussed as treatment options. Timing of meals and proximity of last meal to sleep were discussed. If symptoms persist, a formal gastrointestinal evaluation is needed.\par \par problem 5: positive NADINE (moderate) -Noncompliant\par -unable to tolerate CPAP\par -s/p repeat Home sleep study - now moderate \par -secondary sleep study reveals CPAP of 14 cmH2O - follow up study to be complete for re-evaluation\par -recommended to consider Oral Appliance \par -recommended to use "Chin Strap" \par -reassess for dental device when at his target weight\par -Sleep apnea is associated with adverse clinical consequences which an affect most organ systems.  Cardiovascular disease risk includes arrhythmias, atrial fibrillation, hypertension, coronary artery disease, and stroke. Metabolic disorders include diabetes type 2, non-alcoholic fatty liver disease. Mood disorder especially depression; and cognitive decline especially in the elderly. Associations with  chronic reflux/Anguiano’s esophagus some but not all inclusive. \par -Reasons to assess this include arousal consistent with hypopnea; respiratory events most prominent in REM sleep or supine position; therefore sleep staging and body position are important for accurate diagnosis and estimation of AHI.\par -Treatment options discussed including CPAP/BiPAP machine, oral appliance, ProVent therapy, Oxy-Aid by [employer]pite[employer], new technologies, or positional sleep.Recommended use of the CPAP machine for moderate (AHI >15), moderate to severe (AHI 15-30) and severe patients (AHI > 30). Recommended weight loss which can reduce AHI especially in weight loss of greater than 5% of BMI. Positional sleep is recommended in those with low AHI, low-moderate BMI, and younger age. For severe sleep apnea, the hypoglossal nerve stimulator was recommended as well. \par \par problem 6: obesity/out of shape -(improved)\par -recommended to read "[employer]" , Engine 2" and "Obesity Code" \par -Weight loss, exercise, and diet control were discussed and are highly encouraged. Treatment options were given such as, aqua therapy, and contacting a nutritionist. Recommended to use the elliptical, stationary bike, less use of treadmill.  Obesity is associated with worsening asthma, shortness of breath, and potential for cardiac disease, diabetes, and other underlying medical conditions.\par \par problem 7: poor breathing mechanics\par -Proper breathing techniques were reviewed with an emphasis of exhalation. Patient instructed to breath in for 1 second and out for four seconds. Patient was encouraged to not talk while walking. \par \par Problem 8a: Health Maintenance/COVID19 Precautions \par -s/p Pfizer COVID 19 vaccine x 2 (4/2021) \par - Clean your hands often. Wash your hands often with soap and water for at least 20 seconds, especially after blowing your nose, coughing, or sneezing, or having been in a public place.\par - If soap and water are not available, use a hand  that contains at least 60% alcohol.\par - To the extent possible, avoid touching high-touch surfaces in public places - elevator buttons, door handles, handrails, handshaking with people, etc. Use a tissue or your sleeve to cover your hand or finger if you must touch something.\par - Wash your hands after touching surfaces in public places.\par - Avoid touching your face, nose, eyes, etc.\par - Clean and disinfect your home to remove germs: practice routine cleaning of frequently touched surfaces (for example: tables, doorknobs, light switches, handles, desks, toilets, faucets, sinks & cell phones)\par - Avoid crowds, especially in poorly ventilated spaces. Your risk of exposure to respiratory viruses like COVID-19 may increase in crowded, closed-in settings with little air circulation if there are people in the crowd who are sick. All patients are recommended to practice social distancing and stay at least 6 feet away from others. \par - Avoid all non-essential travel including plane trips, and especially avoid embarking on cruise ships.\par -If COVID-19 is spreading in your community, take extra measures to put distance between yourself and other people to further reduce your risk of being exposed to this new virus.\par -Stay home as much as possible.\par - Consider ways of getting food brought to your house through family, social, or commercial networks\par -Be aware that the virus has been known to live in the air up to 3 hours post exposure, cardboard up to 24 hours post exposure, copper up to 4 hours post exposure, steel and plastic up to 2-3 days post exposure. Risk of transmission from these surfaces are affected by many variables.\par COVID-19 precautionary Immune Support Recommendations:\par -OTC Vitamin C 500mg BID \par -OTC Quercetin 250-500mg BID \par -OTC Zinc 75-100mg per day \par -OTC Melatonin 1 or 2mg a night \par -OTC Vitamin D 1-4000mg per day \par -OTC Tonic Water 8oz per day\par -Water 0.5-1 gallon per day\par Asthma and COVID19:\par You need to make sure your asthma is under control. This often requires the use of inhaled corticosteroids (and sometimes oral corticosteroids). Inhaled corticosteroids do not likely reduce your immune system’s ability to fight infections, but oral corticosteroids may. It is important to use the steps above to protect yourself to limit your exposure to any respiratory virus. \par \par problem 8: health maintenance \par -received  2020 flu shot. (9/4/2020)\par -recommended strep pneumonia vaccines: Prevnar-13 vaccine, followed by Pneumo vaccine 23 one year following\par -recommended early intervention for URIs\par -recommended regular osteoporosis evaluations\par -recommended early dermatological evaluations\par -recommended after the age of 50 to the age of 70, colonoscopy every 5 years \par \par Follow up in 4 months with full spirometry and NiOx testing\par He is encouraged to call with any changes, concerns, or questions.

## 2021-05-12 NOTE — PROCEDURE
[FreeTextEntry1] : FENO was 8; a normal value being less than 25\par Fractional exhaled nitric oxide (FENO) is regarded as a simple, noninvasive method for assessing eosinophilic airway inflammation. Produced by a variety of cells within the lung, nitric oxide (NO) concentrations are generally low in healthy individuals. However, high concentrations of NO appear to be involved in nonspecific host defense mechanisms and chronic inflammatory diseases such as asthma. The American Thoracic Society (ATS) therefore has recommended using FENO to aid in the diagnosis and monitoring of eosinophilic airway inflammation and asthma, and for identifying steroid responsive individuals whose chronic respiratory symptoms may be caused by airway inflammation. \par \par PFT revealed normal flows, with a FEV1 of 2.96 L, which is 98% of predicted, normal lung volumes, and with a normal flow volume loop.

## 2021-05-12 NOTE — ADDENDUM
[FreeTextEntry1] : Documented by Kali Carver acting as a scribe for Dr. Rufus Min on 05/12/2021.\par \par All medical record entries made by the Scribe were at my, Dr. Rufus Min's, direction and personally dictated by me on 05/12/2021 . I have reviewed the chart and agree that the record accurately reflects my personal performance of the history, physical exam, assessment and plan. I have also personally directed, reviewed, and agree with the discharge instructions. \par

## 2021-05-12 NOTE — HISTORY OF PRESENT ILLNESS
[FreeTextEntry1] : Mr. Villegas is a 50 year old male presenting to the office for a follow up visit for asthma, chronic cough, GERD, NADINE, postnasal drip, sleep-disordered breathing, snoring, low vitamin D and shortness of breath. He is presenting today s/p bariatric surgery. His chief complaint is \par \par -he notes generally feeling well\par -he notes exercise every morning walking\par -he notes energy levels stable\par -he notes regular bowel movements\par -he notes sleep quality stable\par -he notes intermittent waking exacerbated by 1-2 episodes of nocturia\par -he notes sleeping 6 to 7 hours a night on average\par -he notes allergies with nasal congestion, residual cough onset 2 weeks ago, but improving, exacerbated by pollen drop\par -he notes senses of smell and taste are good \par -he notes blood sugar controlled, Losartan dosage dropped recently \par -he denies wheeze\par -he denies SOB\par -he notes s/p Pfizer COVID 19 vaccine x 2\par -he denies snore\par \par -denies any chest pain, chest pressure, diarrhea, constipation, dysphagia, sour taste in the mouth, dizziness, leg swelling, leg pain, myalgias, arthralgias, itchy eyes, itchy ears, heartburn, or reflux.\par \par

## 2021-05-20 ENCOUNTER — APPOINTMENT (OUTPATIENT)
Dept: ENDOCRINOLOGY | Facility: CLINIC | Age: 51
End: 2021-05-20
Payer: MEDICAID

## 2021-05-20 ENCOUNTER — RESULT CHARGE (OUTPATIENT)
Age: 51
End: 2021-05-20

## 2021-05-20 VITALS
BODY MASS INDEX: 38.98 KG/M2 | SYSTOLIC BLOOD PRESSURE: 148 MMHG | DIASTOLIC BLOOD PRESSURE: 93 MMHG | HEART RATE: 62 BPM | OXYGEN SATURATION: 95 % | WEIGHT: 220 LBS | HEIGHT: 63 IN

## 2021-05-20 VITALS — HEART RATE: 72 BPM | DIASTOLIC BLOOD PRESSURE: 109 MMHG | OXYGEN SATURATION: 97 % | SYSTOLIC BLOOD PRESSURE: 190 MMHG

## 2021-05-20 PROCEDURE — 82962 GLUCOSE BLOOD TEST: CPT

## 2021-05-20 PROCEDURE — 83036 HEMOGLOBIN GLYCOSYLATED A1C: CPT | Mod: QW

## 2021-05-20 PROCEDURE — 99214 OFFICE O/P EST MOD 30 MIN: CPT | Mod: 25

## 2021-05-20 PROCEDURE — 99072 ADDL SUPL MATRL&STAF TM PHE: CPT

## 2021-05-20 NOTE — HISTORY OF PRESENT ILLNESS
[FreeTextEntry1] : Mr. FABIANO BNEOIT  is a 50 year old male with past medical history of NADINE, Diabetes Mellitus Type 2, s/p Gastric Sleeve, Pituitary microadenoma who presents for management of his diabetes. \par \par \par POCT Glucose: 90 mg/dL\par POCT Hga1c: %\par \par \par Diabetes first diagnosed: 2004\par Type: 2\par \par Current diabetic regimen:\par Metformin (stopped)\par Steglatro 5 mg QD\par \par Other relevant medications:\par Losartan\par Rosuvastatin 20 mg QD\par Zetia\par Self monitoring blood glucose : 1x times per day:\par \par SMBG:\par Pre-breakfast: \par Pre-lunch:\par Pre-dinner:\par bedtime:\par \par Hypoglycemia: denies\par \par \par Diet: Has not been is best control\par \par Exercise:none\par \par Urine micro:WNL (1/2021)\par lipid profile:  (1/2021)\par last hba1c:5.6% (5/2021), 5.7% (1/2021), 5.9% (6/2020)\par eye exam: 2/2021\par diabetic foot exam/podiatry:in office (1/2021)\par \par Re Pediatric Microadenoma\par He was incidentally found to have the microadenoma when he was evaluated for low testosterone. Last MRI was in 2018. There was a finding of a 3.6 x 3.1 mm. Last MRI showed no adenoma.\par \par \par

## 2021-05-20 NOTE — ASSESSMENT
[FreeTextEntry1] : 50 year old male with past medical history of NADINE, Diabetes Mellitus Type 2, s/p Gastric Sleeve who presents for management of his diabetes\par \par 1. DM 2- controlled, uncomplicated\par Patient counseled on the importance of diabetic control and complications. Patient's diet and the necessary changes discussed. Reviewed over glycemic goals. \par Cont Steglatro 5 mg QD\par Check fsg\par Cont diabetic diet\par Cont exercise\par will check microalbumin/cre\par Opthalmology Visit up to date\par Foot Exam up to date\par \par 2. HLD- elevated\par Cont Rosuvastatin and Zetia\par \par 3. Pituitary Microadenoma\par Resolved\par \par Follow up in 3 months\par \par \par \par

## 2021-05-24 ENCOUNTER — NON-APPOINTMENT (OUTPATIENT)
Age: 51
End: 2021-05-24

## 2021-05-24 ENCOUNTER — APPOINTMENT (OUTPATIENT)
Dept: INTERNAL MEDICINE | Facility: CLINIC | Age: 51
End: 2021-05-24
Payer: MEDICAID

## 2021-05-24 VITALS
SYSTOLIC BLOOD PRESSURE: 120 MMHG | BODY MASS INDEX: 38.98 KG/M2 | DIASTOLIC BLOOD PRESSURE: 72 MMHG | HEART RATE: 59 BPM | HEIGHT: 63 IN | WEIGHT: 220 LBS | TEMPERATURE: 97 F | OXYGEN SATURATION: 98 %

## 2021-05-24 PROCEDURE — 99204 OFFICE O/P NEW MOD 45 MIN: CPT

## 2021-05-24 PROCEDURE — 99072 ADDL SUPL MATRL&STAF TM PHE: CPT

## 2021-05-24 NOTE — PHYSICAL EXAM
[No Acute Distress] : no acute distress [Well Nourished] : well nourished [Well Developed] : well developed [Well-Appearing] : well-appearing [Normal Voice/Communication] : normal voice/communication [Normal Sclera/Conjunctiva] : normal sclera/conjunctiva [PERRL] : pupils equal round and reactive to light [EOMI] : extraocular movements intact [Normal Outer Ear/Nose] : the outer ears and nose were normal in appearance [Normal TMs] : both tympanic membranes were normal [No JVD] : no jugular venous distention [No Lymphadenopathy] : no lymphadenopathy [Supple] : supple [Thyroid Normal, No Nodules] : the thyroid was normal and there were no nodules present [No Respiratory Distress] : no respiratory distress  [No Accessory Muscle Use] : no accessory muscle use [Clear to Auscultation] : lungs were clear to auscultation bilaterally [Normal Rate] : normal rate  [Regular Rhythm] : with a regular rhythm [Normal S1, S2] : normal S1 and S2 [No Murmur] : no murmur heard [No Carotid Bruits] : no carotid bruits [No Abdominal Bruit] : a ~M bruit was not heard ~T in the abdomen [No Varicosities] : no varicosities [Pedal Pulses Present] : the pedal pulses are present [No Edema] : there was no peripheral edema [No Palpable Aorta] : no palpable aorta [No Extremity Clubbing/Cyanosis] : no extremity clubbing/cyanosis [Soft] : abdomen soft [Non Tender] : non-tender [Non-distended] : non-distended [No Masses] : no abdominal mass palpated [No HSM] : no HSM [Normal Bowel Sounds] : normal bowel sounds [Normal Supraclavicular Nodes] : no supraclavicular lymphadenopathy [Normal Posterior Cervical Nodes] : no posterior cervical lymphadenopathy [Normal Anterior Cervical Nodes] : no anterior cervical lymphadenopathy [No CVA Tenderness] : no CVA  tenderness [No Spinal Tenderness] : no spinal tenderness [No Joint Swelling] : no joint swelling [Grossly Normal Strength/Tone] : grossly normal strength/tone [No Rash] : no rash [Coordination Grossly Intact] : coordination grossly intact [No Focal Deficits] : no focal deficits [Normal Gait] : normal gait [Deep Tendon Reflexes (DTR)] : deep tendon reflexes were 2+ and symmetric [Speech Grossly Normal] : speech grossly normal [Memory Grossly Normal] : memory grossly normal [Normal Affect] : the affect was normal [Alert and Oriented x3] : oriented to person, place, and time [Normal Mood] : the mood was normal [Normal Insight/Judgement] : insight and judgment were intact

## 2021-05-24 NOTE — HISTORY OF PRESENT ILLNESS
[FreeTextEntry1] : new pt [de-identified] : FABIANO HERNANDEZLUIS ANTONIO is a 50 year old M who presents today to establish care. Also here for his DM and cholesterol. Patient has no new complaints

## 2021-05-24 NOTE — END OF VISIT
[FreeTextEntry3] : "I, Nico Kaplan, personally scribed the services dictated to me by Dr. Goyo Daly MD in this documentation on 05/24/2021 "\par \par "I Dr. Goyo Daly MD, personally performed the services described in this documentation on 05/24/2021 for the patient as scribed by Nico Kaplan in my presence. I have reviewed and verified that all the information is accurate and true."

## 2021-05-24 NOTE — HEALTH RISK ASSESSMENT
[Good] : ~his/her~  mood as  good [No] : In the past 12 months have you used drugs other than those required for medical reasons? No [No falls in past year] : Patient reported no falls in the past year [0] : 2) Feeling down, depressed, or hopeless: Not at all (0) [] : No [[id number]] : 0

## 2021-05-28 LAB — HEMOCCULT STL QL IA: NEGATIVE

## 2021-07-20 ENCOUNTER — NON-APPOINTMENT (OUTPATIENT)
Age: 51
End: 2021-07-20

## 2021-07-20 ENCOUNTER — LABORATORY RESULT (OUTPATIENT)
Age: 51
End: 2021-07-20

## 2021-07-20 ENCOUNTER — APPOINTMENT (OUTPATIENT)
Dept: CARDIOLOGY | Facility: CLINIC | Age: 51
End: 2021-07-20
Payer: MEDICAID

## 2021-07-20 VITALS
WEIGHT: 220 LBS | BODY MASS INDEX: 38.98 KG/M2 | RESPIRATION RATE: 18 BRPM | SYSTOLIC BLOOD PRESSURE: 130 MMHG | OXYGEN SATURATION: 100 % | HEIGHT: 63 IN | TEMPERATURE: 97.4 F | HEART RATE: 53 BPM | DIASTOLIC BLOOD PRESSURE: 84 MMHG

## 2021-07-20 VITALS
RESPIRATION RATE: 16 BRPM | HEART RATE: 83 BPM | OXYGEN SATURATION: 100 % | TEMPERATURE: 97.4 F | HEIGHT: 63 IN | BODY MASS INDEX: 38.98 KG/M2 | WEIGHT: 220 LBS

## 2021-07-20 DIAGNOSIS — I10 ESSENTIAL (PRIMARY) HYPERTENSION: ICD-10-CM

## 2021-07-20 DIAGNOSIS — I37.1 NONRHEUMATIC PULMONARY VALVE INSUFFICIENCY: ICD-10-CM

## 2021-07-20 PROCEDURE — 99214 OFFICE O/P EST MOD 30 MIN: CPT

## 2021-07-20 PROCEDURE — 93000 ELECTROCARDIOGRAM COMPLETE: CPT

## 2021-07-20 PROCEDURE — 99072 ADDL SUPL MATRL&STAF TM PHE: CPT

## 2021-07-20 RX ORDER — IBUPROFEN 800 MG/1
800 TABLET, FILM COATED ORAL
Qty: 20 | Refills: 0 | Status: DISCONTINUED | COMMUNITY
Start: 2021-03-06

## 2021-07-20 RX ORDER — ERTUGLIFLOZIN 15 MG/1
15 TABLET, FILM COATED ORAL
Qty: 90 | Refills: 0 | Status: DISCONTINUED | COMMUNITY
Start: 2020-11-02

## 2021-07-20 NOTE — DISCUSSION/SUMMARY
[FreeTextEntry1] : Dr. Frias [facility] (PRIOR VISIT): \par This is a 49-year-old male with past medical history significant for status post gastric sleeve surgery, non-insulin-dependent diabetes mellitus, reactive airway disease, sleep apnea, asthma, comes in for cardiac follow up evaluation.\par \par Electrocardiogram done María 15, 2020 demonstrates sinus bradycardia rate of 51 bpm is otherwise unremarkable.\par \par The patient had a normal exercise stress test September 9, 2019.\par The patient had a normal nuclear stress test done November 4, 2019 which demonstrated an estimated ejection fraction is 60%.\par \par The patient continues to lose weight.  His hemoglobin A1c has improved.\par \par The patient will continue on his current diet and exercise program.  His lipid panel should improve.  His glycemic profile is also improved significantly.  He will followup with primary care physician as well.\par His cardiac risk factors include borderline hypertension, hyperlipidemia, non-insulin-dependent diabetes mellitus, and history of smoking.He does get occasional dyspnea on exertion.\par \par He does not drink excessive caffeine or alcohol.  He has no history of rheumatic fever.\par He has lost approximately 70 pounds at the bariatric surgery.\par \par He is instructed to followup the regarding his overall medical care.The patient understands that aerobic exercises must be increased to 40 minutes 4 times per week. A detailed discussion of lifestyle modification was done today. The patient has a good understanding of the diagnosis, and treatment plan. Lifestyle modification was also outlined.

## 2021-07-20 NOTE — ASSESSMENT
[FreeTextEntry1] : This is a 50 year year old male here today for follow up cardiac followup evaluation. \par He has a past medical history significant for  status post gastric sleeve surgery, non-insulin-dependent diabetes mellitus, reactive airway disease, sleep apnea, asthma.\par \par Today he is feeling generally well and does not have any complaints at this time. He follows up closely with his PCP and his pulmonary doctor in regards to his DM and his reactive airway disease. \par \par -Cardiac risk factors include history of smoking, diabetes, HTN, HLD.\par \par -BP is well controlled in today's visit and patient is on Losartan 100 mg PO Daily and Steglarto 15mg PO DAILY.\par \par -He states that he is working on proper diet and exercise and would like to work on weight loss. He states that he was doing well s/p gastric sleeve sx but feels like he may have "fallen off a little bit". I have offered he see our dietitian for better diet management. \par \par -New blood work was done 7/20/2021 to evaluate lipid panel. LDL should be at goal of 50 or less. He is Diabetic and is following up with his endocrinologist for management.\par -He is on Zetia 10mg  PO Daily and on Rosuvastatin 20mg PO DAILY \par \par DIABETIC CONTROL:\par I will advise the patient to keep try to stay on a low carbohydrate diet lose weight and exercise to reduce spikes in their blood sugar.  The importance of monitoring blood sugar regularly and HgBA1c level were reviewed. I have also discussed annual eye exams to prevent blindness,  monitoring urine microalbumin regularly to check for kidney damage and the importance of proper foot care and regularly checking feet to prevent sores and possibly loss of limbs was reviewed. \par \par TESTING:\par -EKG done 7/20/2021 which demonstrated regular sinus Bradycardia rhythm with nonspecific ST-T wave changes BPM of 51 BPM. \par \par -The patient had a normal exercise stress test September 9, 2019.\par -The patient had a normal nuclear stress test done November 4, 2019 which demonstrated an estimated ejection fraction is 60%.\par \par -Echocardiogram done 1/8/2021 demonstrated mild mitral tricuspid and pulmonic regurgitation with normal left ventricular systolic function. EF of 65%.\par \par PLAN:\par -He will continue with his usual medications and will contact the office if he is having any complaints between now and their next follow up appointment.\par -Diabetes education provided.\par -He follows up with his pulmonologist Dr. Min for management of his reactive airway disease.\par \par I have discussed the plan of care with Mr. FABIANO BENOIT and he will follow up in 3 months. He is compliant with all of his  medications.\par \par The patient understands that aerobic exercises must be increased to minutes 4 times/week and a detailed discussion of lifestyle modification was done today. \par The patient has a good understanding of the diagnosis, treatment plan and lifestyle modification. He will contact me at the office for any questions with their care or any changes in their health status.\par \par The plan of care was discussed with supervising physician, Dr. Frias while present in the office at the time of the visit. \par \par oRdrigo TALAVERA \par

## 2021-07-20 NOTE — REASON FOR VISIT
[Follow-Up - Clinic] : a clinic follow-up of [Mitral Regurgitation] : mitral regurgitation [CV Risk Factors and Non-Cardiac Disease] : CV risk factors and non-cardiac disease [Hyperlipidemia] : hyperlipidemia [Hypertension] : hypertension [FreeTextEntry1] : NIDDM

## 2021-07-20 NOTE — CARDIOLOGY SUMMARY
[Normal] : normal [___] : [unfilled] [de-identified] : 7/20/2021 [de-identified] : NUC: 11/4/2019 [de-identified] : 9/9/2019 [de-identified] : MAYELA: 9/15/2020\par

## 2021-07-20 NOTE — PHYSICAL EXAM
[Well Developed] : well developed [Well Nourished] : well nourished [No Acute Distress] : no acute distress [Normal Venous Pressure] : normal venous pressure [No Carotid Bruit] : no carotid bruit [Normal S1, S2] : normal S1, S2 [No Murmur] : no murmur [No Rub] : no rub [Clear Lung Fields] : clear lung fields [Good Air Entry] : good air entry [No Respiratory Distress] : no respiratory distress  [Soft] : abdomen soft [Non Tender] : non-tender [No Masses/organomegaly] : no masses/organomegaly [Normal Bowel Sounds] : normal bowel sounds [Normal Gait] : normal gait [No Edema] : no edema [No Cyanosis] : no cyanosis [No Clubbing] : no clubbing [No Varicosities] : no varicosities [No Rash] : no rash [No Skin Lesions] : no skin lesions [Moves all extremities] : moves all extremities [No Focal Deficits] : no focal deficits [Normal Speech] : normal speech [Alert and Oriented] : alert and oriented [Normal memory] : normal memory [General Appearance - Well Developed] : well developed [Normal Appearance] : normal appearance [Well Groomed] : well groomed [General Appearance - Well Nourished] : well nourished [No Deformities] : no deformities [General Appearance - In No Acute Distress] : no acute distress [Normal Conjunctiva] : the conjunctiva exhibited no abnormalities [Normal Oral Mucosa] : normal oral mucosa [Normal Oropharynx] : normal oropharynx [Normal Jugular Venous A Waves Present] : normal jugular venous A waves present [Normal Jugular Venous V Waves Present] : normal jugular venous V waves present [No Jugular Venous Milian A Waves] : no jugular venous milian A waves [Respiration, Rhythm And Depth] : normal respiratory rhythm and effort [Exaggerated Use Of Accessory Muscles For Inspiration] : no accessory muscle use [Auscultation Breath Sounds / Voice Sounds] : lungs were clear to auscultation bilaterally [Bowel Sounds] : normal bowel sounds [Abdomen Soft] : soft [Gait - Sufficient For Exercise Testing] : the gait was sufficient for exercise testing [Abnormal Walk] : normal gait [Nail Clubbing] : no clubbing of the fingernails [Cyanosis, Localized] : no localized cyanosis [Petechial Hemorrhages (___cm)] : no petechial hemorrhages [Skin Color & Pigmentation] : normal skin color and pigmentation [Skin Turgor] : normal skin turgor [] : no rash [No Venous Stasis] : no venous stasis [Skin Lesions] : no skin lesions [Oriented To Time, Place, And Person] : oriented to person, place, and time [Impaired Insight] : insight and judgment were intact [Affect] : the affect was normal [Mood] : the mood was normal [No Anxiety] : not feeling anxious [5th Left ICS - MCL] : palpated at the 5th LICS in the midclavicular line [Normal] : normal [No Precordial Heave] : no precordial heave was noted [Normal Rate] : normal [Heart Rate ___] : [unfilled] bpm [Rhythm Regular] : regular [Normal S1] : normal S1 [Normal S2] : normal S2 [No Gallop] : no gallop heard [I] : a grade 1 [2+] : left 2+ [No Abnormalities] : the abdominal aorta was not enlarged and no bruit was heard [No Pitting Edema] : no pitting edema present [Apical Thrill] : no thrill palpable at the apex [S3] : no S3 [S4] : no S4 [Click] : no click [Pericardial Rub] : no pericardial rub [Rt] : no varicose veins of the right leg [Lt] : no varicose veins of the left leg

## 2021-08-16 ENCOUNTER — TRANSCRIPTION ENCOUNTER (OUTPATIENT)
Age: 51
End: 2021-08-16

## 2021-08-17 ENCOUNTER — NON-APPOINTMENT (OUTPATIENT)
Age: 51
End: 2021-08-17

## 2021-08-17 ENCOUNTER — APPOINTMENT (OUTPATIENT)
Dept: ORTHOPEDIC SURGERY | Facility: CLINIC | Age: 51
End: 2021-08-17
Payer: MEDICAID

## 2021-08-17 VITALS
WEIGHT: 220 LBS | SYSTOLIC BLOOD PRESSURE: 130 MMHG | DIASTOLIC BLOOD PRESSURE: 89 MMHG | HEART RATE: 56 BPM | HEIGHT: 63.5 IN | BODY MASS INDEX: 38.5 KG/M2

## 2021-08-17 PROCEDURE — 99215 OFFICE O/P EST HI 40 MIN: CPT

## 2021-08-17 PROCEDURE — 73562 X-RAY EXAM OF KNEE 3: CPT | Mod: RT

## 2021-08-19 ENCOUNTER — APPOINTMENT (OUTPATIENT)
Dept: PULMONOLOGY | Facility: CLINIC | Age: 51
End: 2021-08-19
Payer: MEDICAID

## 2021-08-19 ENCOUNTER — NON-APPOINTMENT (OUTPATIENT)
Age: 51
End: 2021-08-19

## 2021-08-19 VITALS
WEIGHT: 220 LBS | RESPIRATION RATE: 16 BRPM | OXYGEN SATURATION: 98 % | DIASTOLIC BLOOD PRESSURE: 78 MMHG | HEIGHT: 63.5 IN | HEART RATE: 60 BPM | BODY MASS INDEX: 38.5 KG/M2 | SYSTOLIC BLOOD PRESSURE: 120 MMHG | TEMPERATURE: 97.1 F

## 2021-08-19 DIAGNOSIS — Z91.14 PATIENT'S OTHER NONCOMPLIANCE WITH MEDICATION REGIMEN: ICD-10-CM

## 2021-08-19 PROCEDURE — 95012 NITRIC OXIDE EXP GAS DETER: CPT

## 2021-08-19 PROCEDURE — 94010 BREATHING CAPACITY TEST: CPT

## 2021-08-19 PROCEDURE — 99214 OFFICE O/P EST MOD 30 MIN: CPT | Mod: 25

## 2021-08-19 NOTE — PROCEDURE
[FreeTextEntry1] : PFT revealed normal flows, with a FEV1 of 2.93L, which is 97% of predicted, with a flattened inspiratory limb\par  \par Feno was 13; a normal value being less than 25. Fractional exhaled nitric oxide (FENO) is regarded as a simple, noninvasive method for assessing eosinophilic airway inflammation. Produced by a variety of cells within the lung, nitric oxide (NO) concentrations are generally low in healthy individuals. However, high concentrations of NO appear to be involved in nonspecific host defense mechanisms and chronic inflammatory  diseases such as asthma. The American Thoracic Society (ATS) therefore recommended using FENO to aid in the diagnosis and monitoring of eosinophilic airway inflammation and asthma, and for identifying steroid responsive individuals whose chronic respiratory symptoms may be caused by airway inflammation \par \par -Images and procedures reviewed in detail and discussed with patient.

## 2021-08-19 NOTE — PHYSICAL EXAM
[No Acute Distress] : no acute distress [Normal Oropharynx] : normal oropharynx [III] : Mallampati Class: III [Normal Appearance] : normal appearance [No Neck Mass] : no neck mass [Normal Rate/Rhythm] : normal rate/rhythm [No Murmurs] : no murmurs [Normal S1, S2] : normal s1, s2 [No Resp Distress] : no resp distress [Clear to Auscultation Bilaterally] : clear to auscultation bilaterally [No Abnormalities] : no abnormalities [Benign] : benign [Normal Gait] : normal gait [No Clubbing] : no clubbing [No Edema] : no edema [No Cyanosis] : no cyanosis [FROM] : FROM [Normal Color/ Pigmentation] : normal color/ pigmentation [No Focal Deficits] : no focal deficits [Oriented x3] : oriented x3 [Normal Affect] : normal affect [TextBox_68] : I:E 1:3; Clear

## 2021-08-19 NOTE — ASSESSMENT
[FreeTextEntry1] : Mr. Villegas is a 50 year old male with a history of asthma, allergy, GERD, HTN, diabetes, obesity, who now comes in for a re-evaluation.  He is currently s/p bariatric surgery (7/2018) as a mode of weight loss, now with moderate OSAS which have improved- stable\par  \par His shortness of breath (improved) is felt to be related to:\par -overweight/out of shape\par -poor breathing mechanics\par -asthma\par -cardiac at risk \par \par History of a chronic cough felt to be related to:\par -asthma\par -post nasal drip syndrome \par -secondary GERD \par \par problem 1: asthma (stable)\par -MCT reveals positivity for asthma\par -continue Breo Ellipta 200 1 inhalation QD\par -continue Ventolin as a rescue inhaler 2 inhalations up to QiD \par -Inhaler technique reviewed as well as oral hygiene techniques reviewed with patient. Avoidance of cold air, extremes of temperature, rescue inhaler should be used before exercise. Order of medication reviewed with patient. Recommended use of a cool mist humidifier in the bedroom. Instructed to gargle and spit after inhaler use. \par -Asthma is  believed to be caused by inherited (genetic) and environmental factor, but its exact cause is unknown. Asthma may be triggered by allergens, lung infections, or irritants in the air. Asthma triggers are different for each person\par \par problem 2: post nasal drip syndrome/allergies (quiet) \par -continue Qnasl 1 inhalation per nostril BID\par -continue Clarinex 5 mg QHS \par -continue Olopatadine (0.6) 1 sniff/nostril BID\par -Environmental measures for allergies were encouraged including mattress and pillow cover, air purifier, and environmental controls.\par \par problem 3: r/o allergen profile\par -allergy blood work: food IgE level, asthma profile, eosinophil level, IgE level, and vitamin D level (noncompliant)\par \par problem 4: GERD\par  -continue Pepcid 40 mg QHS \par -Rule of 2s: avoid eating too much, eating too late, eating too spicy, eating two hours before bed\par -Things to avoid including overeating, spicy foods, tight clothing, eating within three hours of bed, this list is not all inclusive. \par -For treatment of reflux, possible options discussed including diet control, H2 blockers, PPIs, as well as coating motility agents discussed as treatment options. Timing of meals and proximity of last meal to sleep were discussed. If symptoms persist, a formal gastrointestinal evaluation is needed.\par \par problem 5: positive NADINE (moderate) -Noncompliant\par -unable to tolerate CPAP\par -s/p repeat Home sleep study - now moderate \par -secondary sleep study reveals CPAP of 14 cmH2O - follow up study to be complete for re-evaluation\par -recommended to consider Oral Appliance \par -recommended to use "Chin Strap" \par -reassess for dental device when at his target weight\par -Sleep apnea is associated with adverse clinical consequences which an affect most organ systems.  Cardiovascular disease risk includes arrhythmias, atrial fibrillation, hypertension, coronary artery disease, and stroke. Metabolic disorders include diabetes type 2, non-alcoholic fatty liver disease. Mood disorder especially depression; and cognitive decline especially in the elderly. Associations with  chronic reflux/Anguiano’s esophagus some but not all inclusive. \par -Reasons to assess this include arousal consistent with hypopnea; respiratory events most prominent in REM sleep or supine position; therefore sleep staging and body position are important for accurate diagnosis and estimation of AHI.\par -Treatment options discussed including CPAP/BiPAP machine, oral appliance, ProVent therapy, Oxy-Aid by [employer], new technologies, or positional sleep.Recommended use of the CPAP machine for moderate (AHI >15), moderate to severe (AHI 15-30) and severe patients (AHI > 30). Recommended weight loss which can reduce AHI especially in weight loss of greater than 5% of BMI. Positional sleep is recommended in those with low AHI, low-moderate BMI, and younger age. For severe sleep apnea, the hypoglossal nerve stimulator was recommended as well. \par \par problem 6: obesity/out of shape -(improved)\par -recommended to read "[employer]" , Engine 2" and "Obesity Code" \par -Weight loss, exercise, and diet control were discussed and are highly encouraged. Treatment options were given such as, aqua therapy, and contacting a nutritionist. Recommended to use the elliptical, stationary bike, less use of treadmill.  Obesity is associated with worsening asthma, shortness of breath, and potential for cardiac disease, diabetes, and other underlying medical conditions.\par \par problem 7: poor breathing mechanics\par -Proper breathing techniques were reviewed with an emphasis of exhalation. Patient instructed to breath in for 1 second and out for four seconds. Patient was encouraged to not talk while walking. \par \par Problem 8a: Health Maintenance/COVID19 Precautions \par -Complete Covid 19 antibody blood work \par -s/p Pfizer COVID 19 vaccine x 2 (4/2021) \par -Covid 19 vaccine discussed at length with patient; booster \par - Clean your hands often. Wash your hands often with soap and water for at least 20 seconds, especially after blowing your nose, coughing, or sneezing, or having been in a public place.\par - If soap and water are not available, use a hand  that contains at least 60% alcohol.\par - To the extent possible, avoid touching high-touch surfaces in public places - elevator buttons, door handles, handrails, handshaking with people, etc. Use a tissue or your sleeve to cover your hand or finger if you must touch something.\par - Wash your hands after touching surfaces in public places.\par - Avoid touching your face, nose, eyes, etc.\par - Clean and disinfect your home to remove germs: practice routine cleaning of frequently touched surfaces (for example: tables, doorknobs, light switches, handles, desks, toilets, faucets, sinks & cell phones)\par - Avoid crowds, especially in poorly ventilated spaces. Your risk of exposure to respiratory viruses like COVID-19 may increase in crowded, closed-in settings with little air circulation if there are people in the crowd who are sick. All patients are recommended to practice social distancing and stay at least 6 feet away from others. \par - Avoid all non-essential travel including plane trips, and especially avoid embarking on cruise ships.\par -If COVID-19 is spreading in your community, take extra measures to put distance between yourself and other people to further reduce your risk of being exposed to this new virus.\par -Stay home as much as possible.\par - Consider ways of getting food brought to your house through family, social, or commercial networks\par -Be aware that the virus has been known to live in the air up to 3 hours post exposure, cardboard up to 24 hours post exposure, copper up to 4 hours post exposure, steel and plastic up to 2-3 days post exposure. Risk of transmission from these surfaces are affected by many variables.\par COVID-19 precautionary Immune Support Recommendations:\par -OTC Vitamin C 500mg BID \par -OTC Quercetin 250-500mg BID \par -OTC Zinc 75-100mg per day \par -OTC Melatonin 1 or 2mg a night \par -OTC Vitamin D 1-4000mg per day \par -OTC Tonic Water 8oz per day\par -Water 0.5-1 gallon per day\par Asthma and COVID19:\par You need to make sure your asthma is under control. This often requires the use of inhaled corticosteroids (and sometimes oral corticosteroids). Inhaled corticosteroids do not likely reduce your immune system’s ability to fight infections, but oral corticosteroids may. It is important to use the steps above to protect yourself to limit your exposure to any respiratory virus. \par \par problem 8: health maintenance \par -received  2020 flu shot. (9/4/2020)\par -recommended strep pneumonia vaccines: Prevnar-13 vaccine, followed by Pneumo vaccine 23 one year following\par -recommended early intervention for URIs\par -recommended regular osteoporosis evaluations\par -recommended early dermatological evaluations\par -recommended after the age of 50 to the age of 70, colonoscopy every 5 years \par \par Follow up in 4 months with full spirometry and NiOx testing\par He is encouraged to call with any changes, concerns, or questions.

## 2021-08-19 NOTE — ADDENDUM
[FreeTextEntry1] : Documented by Todd Slaughter acting as a scribe for Dr. Rufus Min on (08/19/2021).\par \par All medical record entries made by the Scribe were at my, Dr. Rufus Min's, direction and personally dictated by me on (08/19/2021). I have reviewed the chart and agree that the record accurately reflects my personal performance of the history, physical exam, assessment and plan. I have also personally directed, reviewed, and agree with the discharge instructions.\par

## 2021-08-19 NOTE — HISTORY OF PRESENT ILLNESS
[FreeTextEntry1] : Mr. Villegas is a 50 year old male presenting to the office for a follow up visit for asthma, chronic cough, GERD, NADINE, postnasal drip, sleep-disordered breathing, snoring, low vitamin D and shortness of breath. He is presenting today s/p bariatric surgery. His chief complaint is \par -He notes feeling well in general\par -He notes his energy level is stable\par -He notes exercising frequently \par -He notes sleeping well, getting 6-7 hours per night\par -He notes feeling waking up in the morning well rested \par -He notes recently seeing an orthopedic doctor and is s/p cortisone injection due to knee inflammation \par -he notes his bowels are regular \par -He notes his weight is stable \par -s/p Covid 19 vaccine x2 \par -no new medications, vitamins, or supplements \par \par - patient denies any headaches, nausea, vomiting, fever, chills, sweats, chest pain, chest pressure, palpitations, coughing, wheezing, fatigue, diarrhea, constipation, dysphagia, myalgias, dizziness, leg swelling, leg pain, itchy eyes, itchy ears, heartburn, reflux or sour taste in the mouth

## 2021-08-30 ENCOUNTER — TRANSCRIPTION ENCOUNTER (OUTPATIENT)
Age: 51
End: 2021-08-30

## 2021-09-20 ENCOUNTER — APPOINTMENT (OUTPATIENT)
Dept: ENDOCRINOLOGY | Facility: CLINIC | Age: 51
End: 2021-09-20

## 2021-12-20 ENCOUNTER — NON-APPOINTMENT (OUTPATIENT)
Age: 51
End: 2021-12-20

## 2021-12-20 ENCOUNTER — APPOINTMENT (OUTPATIENT)
Dept: PULMONOLOGY | Facility: CLINIC | Age: 51
End: 2021-12-20
Payer: MEDICAID

## 2021-12-20 VITALS
WEIGHT: 220 LBS | TEMPERATURE: 97.4 F | HEART RATE: 61 BPM | SYSTOLIC BLOOD PRESSURE: 140 MMHG | RESPIRATION RATE: 16 BRPM | BODY MASS INDEX: 38.98 KG/M2 | OXYGEN SATURATION: 98 % | HEIGHT: 63 IN | DIASTOLIC BLOOD PRESSURE: 80 MMHG

## 2021-12-20 DIAGNOSIS — R06.83 SNORING: ICD-10-CM

## 2021-12-20 PROCEDURE — 94010 BREATHING CAPACITY TEST: CPT

## 2021-12-20 PROCEDURE — 95012 NITRIC OXIDE EXP GAS DETER: CPT

## 2021-12-20 PROCEDURE — 99214 OFFICE O/P EST MOD 30 MIN: CPT | Mod: 25

## 2021-12-20 NOTE — HISTORY OF PRESENT ILLNESS
[FreeTextEntry1] : Mr. Villegas is a 51 year old male presenting to the office for a follow up visit for asthma, chronic cough, GERD, NADINE, postnasal drip, sleep-disordered breathing, snoring, low vitamin D and shortness of breath. He is presenting today s/p bariatric surgery. His chief complaint is \par - he notes he's well \par - energy level is 8 out of 10, pretty well \par - he has been sleeping much better lately \par - sense of smell / taste is good \par - he notes work has been okay \par - no coughing / wheezing \par - he notes his sinuses are fine \par - he got his flu shot and his booster \par - weight has been stable \par - \par - He  denies any visual issues, headaches, nausea, vomiting, fever, chills, sweats, chest pains, chest pressure, diarrhea, constipation, dysphagia, myalgia, dizziness, leg swelling, leg pain, itchy eyes, itchy ears, heartburn, reflux, or sour taste in the mouth.

## 2021-12-20 NOTE — ASSESSMENT
[FreeTextEntry1] : Mr. Villegas is a 51 year old male with a history of asthma, allergy, GERD, HTN, diabetes, obesity, who now comes in for a re-evaluation.  He is currently s/p bariatric surgery (7/2018) as a mode of weight loss, now with moderate OSAS which have improved- stable- fully vaccinated \par  \par His shortness of breath (improved) is felt to be related to:\par -overweight/out of shape\par -poor breathing mechanics\par -asthma\par -cardiac at risk \par \par History of a chronic cough felt to be related to:\par -asthma\par -post nasal drip syndrome \par -secondary GERD \par \par problem 1: asthma (stable)\par -MCT reveals positivity for asthma\par -continue Breo Ellipta 200 1 inhalation QD\par -continue Ventolin as a rescue inhaler 2 inhalations up to QiD \par -Inhaler technique reviewed as well as oral hygiene techniques reviewed with patient. Avoidance of cold air, extremes of temperature, rescue inhaler should be used before exercise. Order of medication reviewed with patient. Recommended use of a cool mist humidifier in the bedroom. Instructed to gargle and spit after inhaler use. \par -Asthma is  believed to be caused by inherited (genetic) and environmental factor, but its exact cause is unknown. Asthma may be triggered by allergens, lung infections, or irritants in the air. Asthma triggers are different for each person\par \par problem 2: post nasal drip syndrome/allergies (quiet) \par -continue Qnasl 1 inhalation per nostril BID\par -continue Clarinex 5 mg QHS \par -continue Olopatadine (0.6) 1 sniff/nostril BID\par -Environmental measures for allergies were encouraged including mattress and pillow cover, air purifier, and environmental controls.\par \par problem 3: r/o allergen profile\par -allergy blood work: food IgE level, asthma profile, eosinophil level, IgE level, and vitamin D level (noncompliant)\par \par problem 4: GERD\par  -continue Pepcid 40 mg QHS \par -Rule of 2s: avoid eating too much, eating too late, eating too spicy, eating two hours before bed\par -Things to avoid including overeating, spicy foods, tight clothing, eating within three hours of bed, this list is not all inclusive. \par -For treatment of reflux, possible options discussed including diet control, H2 blockers, PPIs, as well as coating motility agents discussed as treatment options. Timing of meals and proximity of last meal to sleep were discussed. If symptoms persist, a formal gastrointestinal evaluation is needed.\par \par problem 5: positive NADINE (moderate) -Noncompliant\par -unable to tolerate CPAP\par -s/p repeat Home sleep study - now moderate \par -secondary sleep study reveals CPAP of 14 cmH2O - follow up study to be complete for re-evaluation\par -recommended to consider Oral Appliance \par -recommended to use "Chin Strap" \par -reassess for dental device when at his target weight\par -Sleep apnea is associated with adverse clinical consequences which an affect most organ systems.  Cardiovascular disease risk includes arrhythmias, atrial fibrillation, hypertension, coronary artery disease, and stroke. Metabolic disorders include diabetes type 2, non-alcoholic fatty liver disease. Mood disorder especially depression; and cognitive decline especially in the elderly. Associations with  chronic reflux/Anguiano’s esophagus some but not all inclusive. \par -Reasons to assess this include arousal consistent with hypopnea; respiratory events most prominent in REM sleep or supine position; therefore sleep staging and body position are important for accurate diagnosis and estimation of AHI.\par -Treatment options discussed including CPAP/BiPAP machine, oral appliance, ProVent therapy, Oxy-Aid by [employer]pitec, new technologies, or positional sleep.Recommended use of the CPAP machine for moderate (AHI >15), moderate to severe (AHI 15-30) and severe patients (AHI > 30). Recommended weight loss which can reduce AHI especially in weight loss of greater than 5% of BMI. Positional sleep is recommended in those with low AHI, low-moderate BMI, and younger age. For severe sleep apnea, the hypoglossal nerve stimulator was recommended as well. \par \par problem 6: obesity/out of shape -(improved)\par -recommended "MUNIQ" OTC supplement \par -recommended to read "[employer]" , Engine 2" and "Obesity Code" \par -Weight loss, exercise, and diet control were discussed and are highly encouraged. Treatment options were given such as, aqua therapy, and contacting a nutritionist. Recommended to use the elliptical, stationary bike, less use of treadmill.  Obesity is associated with worsening asthma, shortness of breath, and potential for cardiac disease, diabetes, and other underlying medical conditions.\par \par problem 7: poor breathing mechanics\par -Recommended Wim Hof and Buteyko breathing techniques \par -Proper breathing techniques were reviewed with an emphasis of exhalation. Patient instructed to breath in for 1 second and out for four seconds. Patient was encouraged to not talk while walking. \par \par Problem 8a: Health Maintenance/COVID19 Precautions \par -Complete Covid 19 antibody blood work \par -s/p Pfizer COVID 19 vaccine x 3 \par -Covid 19 vaccine discussed at length with patient; booster \par - Clean your hands often. Wash your hands often with soap and water for at least 20 seconds, especially after blowing your nose, coughing, or sneezing, or having been in a public place.\par - If soap and water are not available, use a hand  that contains at least 60% alcohol.\par - To the extent possible, avoid touching high-touch surfaces in public places - elevator buttons, door handles, handrails, handshaking with people, etc. Use a tissue or your sleeve to cover your hand or finger if you must touch something.\par - Wash your hands after touching surfaces in public places.\par - Avoid touching your face, nose, eyes, etc.\par - Clean and disinfect your home to remove germs: practice routine cleaning of frequently touched surfaces (for example: tables, doorknobs, light switches, handles, desks, toilets, faucets, sinks & cell phones)\par - Avoid crowds, especially in poorly ventilated spaces. Your risk of exposure to respiratory viruses like COVID-19 may increase in crowded, closed-in settings with little air circulation if there are people in the crowd who are sick. All patients are recommended to practice social distancing and stay at least 6 feet away from others. \par - Avoid all non-essential travel including plane trips, and especially avoid embarking on cruise ships.\par -If COVID-19 is spreading in your community, take extra measures to put distance between yourself and other people to further reduce your risk of being exposed to this new virus.\par -Stay home as much as possible.\par - Consider ways of getting food brought to your house through family, social, or commercial networks\par -Be aware that the virus has been known to live in the air up to 3 hours post exposure, cardboard up to 24 hours post exposure, copper up to 4 hours post exposure, steel and plastic up to 2-3 days post exposure. Risk of transmission from these surfaces are affected by many variables.\par COVID-19 precautionary Immune Support Recommendations:\par -OTC Vitamin C 500mg BID \par -OTC Quercetin 250-500mg BID \par -OTC Zinc 75-100mg per day \par -OTC Melatonin 1 or 2mg a night \par -OTC Vitamin D 1-4000mg per day \par -OTC Tonic Water 8oz per day\par -Water 0.5-1 gallon per day\par Asthma and COVID19:\par You need to make sure your asthma is under control. This often requires the use of inhaled corticosteroids (and sometimes oral corticosteroids). Inhaled corticosteroids do not likely reduce your immune system’s ability to fight infections, but oral corticosteroids may. It is important to use the steps above to protect yourself to limit your exposure to any respiratory virus. \par \par problem 8: health maintenance \par -received flu shot - 2021 \par -recommended strep pneumonia vaccines: Prevnar-13 vaccine, followed by Pneumo vaccine 23 one year following\par -recommended early intervention for URIs\par -recommended regular osteoporosis evaluations\par -recommended early dermatological evaluations\par -recommended after the age of 50 to the age of 70, colonoscopy every 5 years \par \par Follow up in 4 months with full spirometry and NiOx testing\par He is encouraged to call with any changes, concerns, or questions.

## 2021-12-20 NOTE — PROCEDURE
[FreeTextEntry1] : FENO was 8 ; normal value being less than 25\par Fractional exhaled nitric oxide (FENO) is regarded as a simple, noninvasive method for assessing eosinophilic airway inflammation. Produced by a variety of cells within the lung, nitric oxide (NO) concentrations are generally low in healthy individuals. However, high concentrations of NO appear to be involved in nonspecific host defense mechanisms and chronic inflammatory diseases such as asthma. The American Thoracic Society (ATS) therefore has recommended using FENO to aid in the diagnosis and monitoring of eosinophilic airway inflammation and asthma, and for identifying steroid responsive individuals whose chronic respiratory symptoms may be airway inflammation. \par \par PFT reveals normal flows, with an FEV1 of  2.88 L, which is  96% of predicted, with normal flow volume loop

## 2021-12-20 NOTE — PHYSICAL EXAM
[No Acute Distress] : no acute distress [Normal Oropharynx] : normal oropharynx [Normal Appearance] : normal appearance [No Neck Mass] : no neck mass [Normal Rate/Rhythm] : normal rate/rhythm [Normal S1, S2] : normal s1, s2 [No Murmurs] : no murmurs [No Resp Distress] : no resp distress [Clear to Auscultation Bilaterally] : clear to auscultation bilaterally [No Abnormalities] : no abnormalities [Benign] : benign [Normal Gait] : normal gait [No Clubbing] : no clubbing [No Cyanosis] : no cyanosis [No Edema] : no edema [FROM] : FROM [Normal Color/ Pigmentation] : normal color/ pigmentation [No Focal Deficits] : no focal deficits [Oriented x3] : oriented x3 [Normal Affect] : normal affect [II] : Mallampati Class: II [TextBox_68] : I:E 1:3; Clear  Skin normal color for race, warm, dry and intact. No evidence of rash. Skin normal color for race, warm, dry and intact. No evidence of rash. No bruising where pt pointed, pt states was red before but gone now.

## 2021-12-20 NOTE — ADDENDUM
[FreeTextEntry1] : Documented by Ai Brooks acting as a scribe for Dr. Rufus Min on 12/20/2021 \par \par All medical record entries made by the Scribe were at my, Dr. Rufus Min's, direction and personally dictated by me on 12/20/2021 . I have reviewed the chart and agree that the record accurately reflects my personal performance of the history, physical exam, assessment and plan. I have also personally directed, reviewed, and agree with the discharge instructions.

## 2022-01-11 ENCOUNTER — APPOINTMENT (OUTPATIENT)
Dept: ENDOCRINOLOGY | Facility: CLINIC | Age: 52
End: 2022-01-11

## 2022-01-11 ENCOUNTER — APPOINTMENT (OUTPATIENT)
Dept: CARDIOLOGY | Facility: CLINIC | Age: 52
End: 2022-01-11

## 2022-04-21 ENCOUNTER — RX RENEWAL (OUTPATIENT)
Age: 52
End: 2022-04-21

## 2023-03-04 NOTE — PATIENT PROFILE ADULT. - SURGERY TIME
PATIENT REFUSES TO WEAR BIPAP     [x] Risks and benefits explained to patient   [x] Patient refuses to wear Bipap stating Naw that aint working for me  [x] Patient verbalizes understanding of information presented.     07:30

## 2023-04-19 NOTE — ADDENDUM
Problem: Skin Injury Risk Increased  Goal: Skin Health and Integrity  Outcome: Ongoing, Progressing  Intervention: Optimize Skin Protection  Recent Flowsheet Documentation  Taken 4/19/2023 0020 by Shawn Navarro RN  Pressure Reduction Techniques:   frequent weight shift encouraged   heels elevated off bed   weight shift assistance provided  Pressure Reduction Devices:   specialty bed utilized   positioning supports utilized   foam padding utilized  Skin Protection:   adhesive use limited   incontinence pads utilized   tubing/devices free from skin contact  Taken 4/18/2023 2200 by Shawn Navarro RN  Head of Bed (HOB) Positioning: HOB elevated  Taken 4/18/2023 2130 by Shawn Navarro RN  Pressure Reduction Techniques:   frequent weight shift encouraged   heels elevated off bed   weight shift assistance provided  Pressure Reduction Devices:   specialty bed utilized   foam padding utilized   positioning supports utilized  Skin Protection:   adhesive use limited   incontinence pads utilized   tubing/devices free from skin contact  Taken 4/18/2023 1930 by Shawn Navarro RN  Head of Bed (HOB) Positioning: HOB elevated     Problem: Fall Injury Risk  Goal: Absence of Fall and Fall-Related Injury  Outcome: Ongoing, Progressing  Intervention: Identify and Manage Contributors  Recent Flowsheet Documentation  Taken 4/19/2023 0020 by Shawn Navarro RN  Medication Review/Management: medications reviewed  Taken 4/18/2023 2130 by Shawn Navarro RN  Medication Review/Management: medications reviewed  Intervention: Promote Injury-Free Environment  Recent Flowsheet Documentation  Taken 4/19/2023 0020 by Shawn Navarro RN  Safety Promotion/Fall Prevention:   activity supervised   assistive device/personal items within reach   clutter free environment maintained   fall prevention program maintained   lighting adjusted   nonskid shoes/slippers when out of bed   room organization consistent   safety round/check completed  Taken 4/18/2023 2130 by  [FreeTextEntry1] : Documented by Christopher Flores acting as a scribe for Dr. Rufus Min on 12/27/2019.\par \par All medical record entries made by the Scribe were at my, Dr. Rufus Min's, direction and personally dictated by me on 12/27/2019. I have reviewed the chart and agree that the record accurately reflects my personal performance of the history, physical exam, assessment and plan. I have also personally directed, reviewed, and agree with the discharge instructions.  Ramon, Parion, RN  Safety Promotion/Fall Prevention:   activity supervised   assistive device/personal items within reach   clutter free environment maintained   fall prevention program maintained   lighting adjusted   nonskid shoes/slippers when out of bed   room organization consistent   safety round/check completed     Problem: Adult Inpatient Plan of Care  Goal: Plan of Care Review  Outcome: Ongoing, Progressing  Flowsheets (Taken 4/19/2023 0312)  Plan of Care Reviewed With: patient  Goal: Patient-Specific Goal (Individualized)  Outcome: Ongoing, Progressing  Goal: Absence of Hospital-Acquired Illness or Injury  Outcome: Ongoing, Progressing  Intervention: Identify and Manage Fall Risk  Recent Flowsheet Documentation  Taken 4/19/2023 0020 by Shawn Navarro RN  Safety Promotion/Fall Prevention:   activity supervised   assistive device/personal items within reach   clutter free environment maintained   fall prevention program maintained   lighting adjusted   nonskid shoes/slippers when out of bed   room organization consistent   safety round/check completed  Taken 4/18/2023 2130 by Shawn Navarro RN  Safety Promotion/Fall Prevention:   activity supervised   assistive device/personal items within reach   clutter free environment maintained   fall prevention program maintained   lighting adjusted   nonskid shoes/slippers when out of bed   room organization consistent   safety round/check completed  Intervention: Prevent Skin Injury  Recent Flowsheet Documentation  Taken 4/19/2023 0020 by Shawn Navarro RN  Skin Protection:   adhesive use limited   incontinence pads utilized   tubing/devices free from skin contact  Taken 4/18/2023 2200 by Shawn Navarro, RN  Body Position:   right   turned   weight shifting  Taken 4/18/2023 2130 by Shawn Navarro RN  Skin Protection:   adhesive use limited   incontinence pads utilized   tubing/devices free from skin contact  Taken 4/18/2023 1930 by Shawn Navarro, RN  Body Position:   left    turned  Intervention: Prevent and Manage VTE (Venous Thromboembolism) Risk  Recent Flowsheet Documentation  Taken 4/19/2023 0020 by Shawn Navarro RN  Activity Management: bedrest  Taken 4/18/2023 2130 by Shawn Navarro RN  Activity Management: bedrest  VTE Prevention/Management: patient refused intervention  Intervention: Prevent Infection  Recent Flowsheet Documentation  Taken 4/19/2023 0020 by Shawn Navarro RN  Infection Prevention:   environmental surveillance performed   rest/sleep promoted  Taken 4/18/2023 2130 by Shawn Navarro RN  Infection Prevention:   environmental surveillance performed   rest/sleep promoted  Goal: Optimal Comfort and Wellbeing  Outcome: Ongoing, Progressing  Intervention: Provide Person-Centered Care  Recent Flowsheet Documentation  Taken 4/18/2023 2130 by Shawn Navarro RN  Trust Relationship/Rapport:   care explained   choices provided   emotional support provided   empathic listening provided   reassurance provided   thoughts/feelings acknowledged  Goal: Readiness for Transition of Care  Outcome: Ongoing, Progressing   Goal Outcome Evaluation:  Plan of Care Reviewed With: patient         Patient intermittently drowsy throughout shift.  Patient complained of discomfort this shift, resolved with turn and reposition.  Dressing change to buttocks performed this shift due to soiled dressing after incontinent bowel movement.  Specialty bed in place.  Bed alarm activated.  Q2 and prn turn performed.  Bilateral heels elevated.  Patient refuses bilateral heel boots.  Patient agreeable to taking some medication, patient refused a couple stating she did not want to take anymore.  Patient rested intermittently throughout shift.

## 2023-06-19 NOTE — ASU PATIENT PROFILE, ADULT - MEDICATION HERBAL REMEDIES, PROFILE
Pt arrived for cardiac MRI with stress. Allergies, medications, and safety checklist reviewed with patient. Test explained and all questions were answered. Denies caffeine intake. Lungs are clear. Lexiscan 0.4 mg given over 10 seconds followed by 5 mL of saline. After stress imaging complete, 100 mg IV Aminophylline given per MD order. Pt tolerated the scan, medications, and contrast well. Pre and post EKG completed. Pt monitored after MRI and escorted back to [facility] waiting room.     
no

## 2023-08-08 NOTE — QUALITY MEASURES
4 = No assist / stand by assistance [Visual inspection, sensory exam] : Foot exam, including visual inspection, sensory exam with mono filament, and pulse exam, was performed within the last 12 months

## 2023-08-10 ENCOUNTER — NON-APPOINTMENT (OUTPATIENT)
Age: 53
End: 2023-08-10

## 2023-08-10 ENCOUNTER — APPOINTMENT (OUTPATIENT)
Dept: BARIATRICS | Facility: CLINIC | Age: 53
End: 2023-08-10
Payer: MEDICAID

## 2023-08-10 VITALS
TEMPERATURE: 96.7 F | HEIGHT: 64 IN | DIASTOLIC BLOOD PRESSURE: 74 MMHG | BODY MASS INDEX: 37.39 KG/M2 | WEIGHT: 219 LBS | HEART RATE: 74 BPM | OXYGEN SATURATION: 99 % | SYSTOLIC BLOOD PRESSURE: 136 MMHG

## 2023-08-10 DIAGNOSIS — Z98.84 BARIATRIC SURGERY STATUS: ICD-10-CM

## 2023-08-10 DIAGNOSIS — K20.90 ESOPHAGITIS, UNSPECIFIED WITHOUT BLEEDING: ICD-10-CM

## 2023-08-10 PROCEDURE — 99204 OFFICE O/P NEW MOD 45 MIN: CPT

## 2023-08-10 RX ORDER — ERTUGLIFLOZIN 5 MG/1
5 TABLET, FILM COATED ORAL
Qty: 90 | Refills: 3 | Status: DISCONTINUED | COMMUNITY
End: 2023-08-10

## 2023-08-10 RX ORDER — ROSUVASTATIN CALCIUM 20 MG/1
20 TABLET, FILM COATED ORAL
Qty: 90 | Refills: 1 | Status: DISCONTINUED | COMMUNITY
Start: 2020-06-15 | End: 2023-08-10

## 2023-08-10 RX ORDER — MULTIVITAMIN
TABLET ORAL
Refills: 0 | Status: DISCONTINUED | COMMUNITY
End: 2023-08-10

## 2023-08-10 RX ORDER — ATORVASTATIN CALCIUM 10 MG/1
10 TABLET, FILM COATED ORAL DAILY
Refills: 0 | Status: ACTIVE | COMMUNITY

## 2023-08-10 RX ORDER — LOSARTAN POTASSIUM 100 MG/1
100 TABLET, FILM COATED ORAL
Qty: 90 | Refills: 3 | Status: DISCONTINUED | COMMUNITY
End: 2023-08-10

## 2023-08-10 RX ORDER — LOSARTAN POTASSIUM AND HYDROCHLOROTHIAZIDE 12.5; 5 MG/1; MG/1
50-12.5 TABLET ORAL DAILY
Refills: 0 | Status: ACTIVE | COMMUNITY

## 2023-08-10 RX ORDER — EZETIMIBE 10 MG/1
10 TABLET ORAL DAILY
Qty: 90 | Refills: 1 | Status: DISCONTINUED | COMMUNITY
Start: 2021-01-12 | End: 2023-08-10

## 2023-08-10 NOTE — PHYSICAL EXAM
[Obese, well nourished, in no acute distress] : obese, well nourished, in no acute distress [Normal] : affect appropriate [de-identified] : breathing comfortable on room air [de-identified] : RRR [de-identified] : soft, non-distended, non-tender, well healed scars, no hernias.

## 2023-08-10 NOTE — ASSESSMENT
[FreeTextEntry1] : 53 yo male, with long history of obesity, s/p laparoscopic sleeve gastrectomy (7/2018 w/ Dr. Mireles) here for return of bariatric care. Reports reflux. Feels restricted but working on improving diet and exercise.  Obtain vitamin levels Instruct to take bariatric multivitamins with iron and calcium Prescription of omeprazole sent to pharmacy Continue 3 protein-focused meals/day (aim for 60 g - 70 g protein/day) Encourage zero calorie fluid intake (64 oz/day)  Exercise with cardio (aim for 8k-10k steps/day), and strength training 2x/week Use step counter Weigh yourself 1x-2x/week Follow-up with nutritionist  Return to office in 6 weeks  All questions answered  Call with any questions or concerns

## 2023-08-10 NOTE — HISTORY OF PRESENT ILLNESS
[de-identified] : 51 yo male, with long history of obesity, s/p laparoscopic sleeve gastrectomy (7/2018 w/ Dr. Mireles) here for return of bariatric care. Last time evaluated in 2020, and since then he is referring a plateau in his weight lost. Lowest weight 218lb. He is still aiming for weight loss, down to 200lb approximately. He continues to make healthy choices for meals, but still with some significant amount of carbs and snacks. He is not exercising lately. He reports reflux. Denies abdominal pain. He still feels restricted when eating but is working in having more fluid intake.  Patient would like to lose 20 more pounds.  Not sure he was last seen in 2021.

## 2023-08-10 NOTE — REVIEW OF SYSTEMS
[Reflux/Heartburn] : reflux/heartburn [Negative] : Heme/Lymph [Vomiting] : no vomiting [Constipation] : no constipation [Diarrhea] : no diarrhea

## 2023-08-11 ENCOUNTER — APPOINTMENT (OUTPATIENT)
Dept: PULMONOLOGY | Facility: CLINIC | Age: 53
End: 2023-08-11
Payer: MEDICAID

## 2023-08-11 VITALS
DIASTOLIC BLOOD PRESSURE: 80 MMHG | RESPIRATION RATE: 16 BRPM | HEIGHT: 64 IN | WEIGHT: 219 LBS | TEMPERATURE: 97.4 F | SYSTOLIC BLOOD PRESSURE: 130 MMHG | HEART RATE: 58 BPM | OXYGEN SATURATION: 98 % | BODY MASS INDEX: 37.39 KG/M2

## 2023-08-11 PROCEDURE — 94010 BREATHING CAPACITY TEST: CPT

## 2023-08-11 PROCEDURE — 95012 NITRIC OXIDE EXP GAS DETER: CPT

## 2023-08-11 PROCEDURE — 99214 OFFICE O/P EST MOD 30 MIN: CPT | Mod: 25

## 2023-08-11 NOTE — PHYSICAL EXAM

## 2023-08-11 NOTE — ADDENDUM
[FreeTextEntry1] : Documented by Figueroa Garcia acting as a scribe for Dr. Rufus Min on 08/11/2023. All medical record entries made by the Scribe were at my, Dr. Rufus Min's, direction and personally dictated by me on 08/10/2023. I have reviewed the chart and agree that the record accurately reflects my personal performance of the history, physical exam, assessment and plan. I have also personally directed, reviewed, and agree with the discharge instructions.

## 2023-08-11 NOTE — ASSESSMENT
[FreeTextEntry1] : Mr. Villegas is a 52 year old male with a history of asthma, allergy, GERD, HTN, diabetes, obesity, who now comes in for a re-evaluation.  He is currently s/p bariatric surgery (7/2018) as a mode of weight loss, now with moderate OSAS which have improved- stable- still working on weight (Ozempic 8/2023)   His shortness of breath (improved) is felt to be related to: -overweight/out of shape -poor breathing mechanics -asthma -cardiac at risk   History of a chronic cough felt to be related to: -asthma -post nasal drip syndrome  -secondary GERD   problem 1: asthma (stable) -MCT reveals positivity for asthma -continue Breo Ellipta 200 1 inhalation QD -continue Ventolin as a rescue inhaler 2 inhalations up to QiD  -Inhaler technique reviewed as well as oral hygiene techniques reviewed with patient. Avoidance of cold air, extremes of temperature, rescue inhaler should be used before exercise. Order of medication reviewed with patient. Recommended use of a cool mist humidifier in the bedroom. Instructed to gargle and spit after inhaler use.  -Asthma is  believed to be caused by inherited (genetic) and environmental factor, but its exact cause is unknown. Asthma may be triggered by allergens, lung infections, or irritants in the air. Asthma triggers are different for each person  problem 2: post nasal drip syndrome/allergies (quiet)  -continue Qnasl 1 inhalation per nostril BID -continue Clarinex 5 mg QHS  -continue Olopatadine (0.6) 1 sniff/nostril BID -Environmental measures for allergies were encouraged including mattress and pillow cover, air purifier, and environmental controls.  problem 3: r/o allergen profile -allergy blood work: food IgE level, asthma profile, eosinophil level, IgE level, and vitamin D level (noncompliant)  problem 4: GERD  -continue Pepcid 40 mg QHS  -Rule of 2s: avoid eating too much, eating too late, eating too spicy, eating two hours before bed -Things to avoid including overeating, spicy foods, tight clothing, eating within three hours of bed, this list is not all inclusive.  -For treatment of reflux, possible options discussed including diet control, H2 blockers, PPIs, as well as coating motility agents discussed as treatment options. Timing of meals and proximity of last meal to sleep were discussed. If symptoms persist, a formal gastrointestinal evaluation is needed.  problem 5: positive NADINE (moderate) -Noncompliant -unable to tolerate CPAP -s/p repeat Home sleep study - (now moderate)- repeat HSS -secondary sleep study reveals CPAP of 14 cmH2O - follow up study to be complete for re-evaluation -recommended to consider Oral Appliance  -recommended to use "Chin Strap"  -reassess for dental device when at his target weight -Sleep apnea is associated with adverse clinical consequences which an affect most organ systems.  Cardiovascular disease risk includes arrhythmias, atrial fibrillation, hypertension, coronary artery disease, and stroke. Metabolic disorders include diabetes type 2, non-alcoholic fatty liver disease. Mood disorder especially depression; and cognitive decline especially in the elderly. Associations with  chronic reflux/Anguiano's esophagus some but not all inclusive.  -Reasons to assess this include arousal consistent with hypopnea; respiratory events most prominent in REM sleep or supine position; therefore sleep staging and body position are important for accurate diagnosis and estimation of AHI. -Treatment options discussed including CPAP/BiPAP machine, oral appliance, ProVent therapy, Oxy-Aid by Respitec, new technologies, or positional sleep.Recommended use of the CPAP machine for moderate (AHI >15), moderate to severe (AHI 15-30) and severe patients (AHI > 30). Recommended weight loss which can reduce AHI especially in weight loss of greater than 5% of BMI. Positional sleep is recommended in those with low AHI, low-moderate BMI, and younger age. For severe sleep apnea, the hypoglossal nerve stimulator was recommended as well.   problem 6: obesity/out of shape -(improved)- Kannan (ozempic 8/2023) -recommended "MUNIQ" OTC supplement  -recommended to read "[employer]" , Engine 2" and "Obesity Code"  -Weight loss, exercise, and diet control were discussed and are highly encouraged. Treatment options were given such as, aqua therapy, and contacting a nutritionist. Recommended to use the elliptical, stationary bike, less use of treadmill.  Obesity is associated with worsening asthma, shortness of breath, and potential for cardiac disease, diabetes, and other underlying medical conditions.  problem 7: poor breathing mechanics -Recommended Allyssa Mcmahan and Buteyko breathing techniques  -Proper breathing techniques were reviewed with an emphasis of exhalation. Patient instructed to breath in for 1 second and out for four seconds. Patient was encouraged to not talk while walking.   Problem 8a: Health Maintenance/COVID19 Precautions  -Complete Covid 19 antibody blood work  -s/p Pfizer COVID 19 vaccine x 3  -Covid 19 vaccine discussed at length with patient; booster  - Clean your hands often. Wash your hands often with soap and water for at least 20 seconds, especially after blowing your nose, coughing, or sneezing, or having been in a public place. - If soap and water are not available, use a hand  that contains at least 60% alcohol. - To the extent possible, avoid touching high-touch surfaces in public places - elevator buttons, door handles, handrails, handshaking with people, etc. Use a tissue or your sleeve to cover your hand or finger if you must touch something. - Wash your hands after touching surfaces in public places. - Avoid touching your face, nose, eyes, etc. - Clean and disinfect your home to remove germs: practice routine cleaning of frequently touched surfaces (for example: tables, doorknobs, light switches, handles, desks, toilets, faucets, sinks & cell phones) - Avoid crowds, especially in poorly ventilated spaces. Your risk of exposure to respiratory viruses like COVID-19 may increase in crowded, closed-in settings with little air circulation if there are people in the crowd who are sick. All patients are recommended to practice social distancing and stay at least 6 feet away from others.  - Avoid all non-essential travel including plane trips, and especially avoid embarking on cruise ships. -If COVID-19 is spreading in your community, take extra measures to put distance between yourself and other people to further reduce your risk of being exposed to this new virus. -Stay home as much as possible. - Consider ways of getting food brought to your house through family, social, or commercial networks -Be aware that the virus has been known to live in the air up to 3 hours post exposure, cardboard up to 24 hours post exposure, copper up to 4 hours post exposure, steel and plastic up to 2-3 days post exposure. Risk of transmission from these surfaces are affected by many variables. COVID-19 precautionary Immune Support Recommendations: -OTC Vitamin C 500mg BID  -OTC Quercetin 250-500mg BID  -OTC Zinc 75-100mg per day  -OTC Melatonin 1 or 2mg a night  -OTC Vitamin D 1-4000mg per day  -OTC Tonic Water 8oz per day -Water 0.5-1 gallon per day Asthma and COVID19: You need to make sure your asthma is under control. This often requires the use of inhaled corticosteroids (and sometimes oral corticosteroids). Inhaled corticosteroids do not likely reduce your immune system's ability to fight infections, but oral corticosteroids may. It is important to use the steps above to protect yourself to limit your exposure to any respiratory virus.   Problem 9: abnormal CT  - large thin walled air cyst in RML sub pleural region measuring 4.7 cm  - follow up CT January 2024 - check alpha 1 anti trypsin level   problem 10: health maintenance  -received flu shot - 2021  -recommended strep pneumonia vaccines: Prevnar-13 vaccine, followed by Pneumo vaccine 23 one year following -recommended early intervention for URIs -recommended regular osteoporosis evaluations -recommended early dermatological evaluations -recommended after the age of 50 to the age of 70, colonoscopy every 5 years   Follow up in 4 months with full spirometry and NiOx testing He is encouraged to call with any changes, concerns, or questions.

## 2023-08-11 NOTE — PROCEDURE
[FreeTextEntry1] : PFTs revealed normal flows; FEV1 was 2.88 L, which is 94% of predicted; normal flow volume loop. PFTs were performed to evaluate for SOB  Chest CT (2023) impression  large thin walled air cyst in RML sub pleural region measuring 4.7 cm   FENO was 18; a normal value being less than 25 Fractional exhaled nitric oxide (FENO) is regarded as a simple, noninvasive method for assessing eosinophilic airway inflammation. Produced by a variety of cells within the lung, nitric oxide (NO) concentrations are generally low in healthy individuals. However, high concentrations of NO appear to be involved in nonspecific host defense mechanisms and chronic inflammatory diseases such as asthma. The American Thoracic Society (ATS) therefore has recommended using FENO to aid in the diagnosis and monitoring of eosinophilic airway inflammation and asthma, and for identifying steroid responsive individuals whose chronic respiratory symptoms may be caused by airway inflammation.

## 2023-08-11 NOTE — HISTORY OF PRESENT ILLNESS
[FreeTextEntry1] : Mr. Villegas is a 51 year old male presenting to the office for a follow up visit for asthma, chronic cough, GERD, NADINE, postnasal drip, sleep-disordered breathing, snoring, low vitamin D and shortness of breath. He is presenting today s/p bariatric surgery. His chief complaint is   -he notes energy levels are good  - he notes GERD Sx  - he notes being on Ozempic  -he notes losing weight  -he notes good quality of sleep  -he notes sleeping for 6 hours  -he denies exercising  -he notes vision is stable  -he denies dysphonia  - he notes having a cough  - he denies smoking  - he notes wanting to lose weight  - he notes sleep apnea is not treated  - he denies having NADINE Sx  -he notes sinuses are active  -he denies any headaches, nausea, emesis, fever, chills, sweats, chest pain, chest pressure, wheezing, palpitations, diarrhea, constipation, dysphagia, vertigo, arthralgias, myalgias, leg swelling, itchy eyes, itchy ears, or sour taste in the mouth.

## 2023-08-12 LAB — A1AT SERPL-MCNC: 124 MG/DL

## 2023-08-14 LAB
25(OH)D3 SERPL-MCNC: 53.7 NG/ML
FERRITIN SERPL-MCNC: 72 NG/ML
FOLATE SERPL-MCNC: 16.4 NG/ML
IRON SERPL-MCNC: 79 UG/DL
TSH SERPL-ACNC: 1.34 UIU/ML
VIT B12 SERPL-MCNC: 1146 PG/ML

## 2023-08-15 LAB — VIT B1 SERPL-MCNC: 123.7 NMOL/L

## 2023-08-16 LAB
A1AT PHENOTYP SERPL-IMP: NORMAL
A1AT SERPL-MCNC: 124 MG/DL

## 2023-08-16 RX ORDER — OMEPRAZOLE 20 MG/1
20 CAPSULE, DELAYED RELEASE ORAL DAILY
Qty: 30 | Refills: 3 | Status: DISCONTINUED | COMMUNITY
Start: 2023-08-10 | End: 2023-08-16

## 2023-08-18 LAB — VIT A SERPL-MCNC: 44.3 UG/DL

## 2023-08-22 LAB — ZINC SERPL-MCNC: 102 UG/DL

## 2023-08-25 LAB
ANNOTATION COMMENT IMP: NORMAL
ELECTRONIC SIGNATURE: NORMAL
SERPINA1 GENE MUT TESTED BLD/T: NORMAL

## 2023-09-18 DIAGNOSIS — K91.2 POSTSURGICAL MALABSORPTION, NOT ELSEWHERE CLASSIFIED: ICD-10-CM

## 2023-09-18 DIAGNOSIS — Z90.3 POSTSURGICAL MALABSORPTION, NOT ELSEWHERE CLASSIFIED: ICD-10-CM

## 2023-09-21 ENCOUNTER — APPOINTMENT (OUTPATIENT)
Dept: BARIATRICS | Facility: CLINIC | Age: 53
End: 2023-09-21
Payer: MEDICAID

## 2023-09-21 ENCOUNTER — APPOINTMENT (OUTPATIENT)
Dept: BARIATRICS/WEIGHT MGMT | Facility: CLINIC | Age: 53
End: 2023-09-21
Payer: MEDICAID

## 2023-09-21 ENCOUNTER — NON-APPOINTMENT (OUTPATIENT)
Age: 53
End: 2023-09-21

## 2023-09-21 VITALS
OXYGEN SATURATION: 98 % | BODY MASS INDEX: 38.76 KG/M2 | DIASTOLIC BLOOD PRESSURE: 76 MMHG | TEMPERATURE: 96.7 F | HEART RATE: 81 BPM | SYSTOLIC BLOOD PRESSURE: 128 MMHG | HEIGHT: 64 IN | WEIGHT: 227 LBS

## 2023-09-21 VITALS — BODY MASS INDEX: 38.58 KG/M2 | WEIGHT: 226 LBS | HEIGHT: 64 IN

## 2023-09-21 PROCEDURE — 99214 OFFICE O/P EST MOD 30 MIN: CPT

## 2023-09-21 PROCEDURE — 97802 MEDICAL NUTRITION INDIV IN: CPT

## 2023-09-21 RX ORDER — MULTIVIT-MIN/IRON/FOLIC ACID/K 45-800-120
CAPSULE ORAL DAILY
Refills: 0 | Status: ACTIVE | COMMUNITY

## 2023-09-21 RX ORDER — HYALURONATE SODIUM, STABILIZED 60 MG/3 ML
60 SYRINGE (ML) INTRAARTICULAR
Qty: 1 | Refills: 0 | Status: DISCONTINUED | OUTPATIENT
Start: 2021-08-17 | End: 2023-09-21

## 2023-09-21 RX ORDER — CALCIUM CARBONATE/VITAMIN D3 500 MG-2.5
TABLET,CHEWABLE ORAL DAILY
Refills: 0 | Status: ACTIVE | COMMUNITY

## 2023-10-02 ENCOUNTER — APPOINTMENT (OUTPATIENT)
Dept: SLEEP CENTER | Facility: CLINIC | Age: 53
End: 2023-10-02
Payer: MEDICAID

## 2023-10-02 ENCOUNTER — OUTPATIENT (OUTPATIENT)
Dept: OUTPATIENT SERVICES | Facility: HOSPITAL | Age: 53
LOS: 1 days | End: 2023-10-02
Payer: MEDICAID

## 2023-10-02 DIAGNOSIS — Z98.890 OTHER SPECIFIED POSTPROCEDURAL STATES: Chronic | ICD-10-CM

## 2023-10-02 DIAGNOSIS — Z96.652 PRESENCE OF LEFT ARTIFICIAL KNEE JOINT: Chronic | ICD-10-CM

## 2023-10-02 PROCEDURE — 95800 SLP STDY UNATTENDED: CPT

## 2023-10-02 PROCEDURE — 95800 SLP STDY UNATTENDED: CPT | Mod: 26

## 2023-10-05 DIAGNOSIS — G47.33 OBSTRUCTIVE SLEEP APNEA (ADULT) (PEDIATRIC): ICD-10-CM

## 2023-10-09 ENCOUNTER — APPOINTMENT (OUTPATIENT)
Dept: PULMONOLOGY | Facility: CLINIC | Age: 53
End: 2023-10-09
Payer: MEDICAID

## 2023-10-09 PROCEDURE — 99442: CPT

## 2023-11-08 ENCOUNTER — APPOINTMENT (OUTPATIENT)
Dept: ORTHOPEDIC SURGERY | Facility: CLINIC | Age: 53
End: 2023-11-08

## 2023-11-16 ENCOUNTER — APPOINTMENT (OUTPATIENT)
Dept: BARIATRICS/WEIGHT MGMT | Facility: CLINIC | Age: 53
End: 2023-11-16

## 2023-11-16 ENCOUNTER — APPOINTMENT (OUTPATIENT)
Dept: ENDOCRINOLOGY | Facility: CLINIC | Age: 53
End: 2023-11-16
Payer: MEDICAID

## 2023-11-16 ENCOUNTER — APPOINTMENT (OUTPATIENT)
Dept: BARIATRICS | Facility: CLINIC | Age: 53
End: 2023-11-16
Payer: MEDICAID

## 2023-11-16 VITALS
DIASTOLIC BLOOD PRESSURE: 82 MMHG | HEIGHT: 64 IN | BODY MASS INDEX: 38.77 KG/M2 | OXYGEN SATURATION: 97 % | WEIGHT: 227.07 LBS | SYSTOLIC BLOOD PRESSURE: 120 MMHG | HEART RATE: 55 BPM | TEMPERATURE: 97.3 F

## 2023-11-16 PROCEDURE — 99214 OFFICE O/P EST MOD 30 MIN: CPT

## 2023-11-16 RX ORDER — OMEPRAZOLE 20 MG/1
20 CAPSULE, DELAYED RELEASE ORAL EVERY OTHER DAY
Refills: 0 | Status: ACTIVE | COMMUNITY

## 2023-11-16 RX ORDER — OMEPRAZOLE 20 MG/1
20 CAPSULE, DELAYED RELEASE ORAL DAILY
Qty: 30 | Refills: 3 | Status: DISCONTINUED | COMMUNITY
Start: 2023-08-16 | End: 2023-11-16

## 2023-11-16 RX ORDER — OMEPRAZOLE 20 MG/1
20 CAPSULE, DELAYED RELEASE ORAL DAILY
Qty: 30 | Refills: 0 | Status: COMPLETED | COMMUNITY
Start: 2017-12-06 | End: 2023-12-16

## 2023-11-16 RX ORDER — ANTIARTHRITIC COMBINATION NO.2 900 MG
TABLET ORAL DAILY
Refills: 0 | Status: DISCONTINUED | COMMUNITY
End: 2023-11-16

## 2023-11-30 NOTE — H&P PST ADULT - ABILITY TO HEAR (WITH HEARING AID OR HEARING APPLIANCE IF NORMALLY USED):
Left voicemail with instructions for patient to call back to schedule their appointment(s)    November 30, 2023 , 9:13 AM         Adequate: hears normal conversation without difficulty

## 2023-12-01 LAB
ALBUMIN SERPL ELPH-MCNC: 4.4 G/DL
ALP BLD-CCNC: 78 U/L
ALT SERPL-CCNC: 22 U/L
ANION GAP SERPL CALC-SCNC: 12 MMOL/L
AST SERPL-CCNC: 19 U/L
BILIRUB SERPL-MCNC: 0.6 MG/DL
BUN SERPL-MCNC: 20 MG/DL
CALCIUM SERPL-MCNC: 9.5 MG/DL
CHLORIDE SERPL-SCNC: 101 MMOL/L
CHOLEST SERPL-MCNC: 136 MG/DL
CO2 SERPL-SCNC: 26 MMOL/L
CREAT SERPL-MCNC: 1.03 MG/DL
CREAT SPEC-SCNC: 87 MG/DL
EGFR: 86 ML/MIN/1.73M2
ESTIMATED AVERAGE GLUCOSE: 128 MG/DL
FOLATE SERPL-MCNC: >20 NG/ML
FRUCTOSAMINE SERPL-MCNC: 230 UMOL/L
GLUCOSE SERPL-MCNC: 79 MG/DL
HBA1C MFR BLD HPLC: 6.1 %
HCT VFR BLD CALC: 44.1 %
HDLC SERPL-MCNC: 49 MG/DL
HGB BLD-MCNC: 13.9 G/DL
LDLC SERPL CALC-MCNC: 71 MG/DL
MCHC RBC-ENTMCNC: 26.3 PG
MCHC RBC-ENTMCNC: 31.5 GM/DL
MCV RBC AUTO: 83.4 FL
MICROALBUMIN 24H UR DL<=1MG/L-MCNC: <1.2 MG/DL
MICROALBUMIN/CREAT 24H UR-RTO: NORMAL MG/G
NONHDLC SERPL-MCNC: 87 MG/DL
PLATELET # BLD AUTO: 232 K/UL
POTASSIUM SERPL-SCNC: 4.3 MMOL/L
PROT SERPL-MCNC: 6.7 G/DL
RBC # BLD: 5.29 M/UL
RBC # FLD: 14.7 %
SODIUM SERPL-SCNC: 139 MMOL/L
TRIGL SERPL-MCNC: 79 MG/DL
TSH SERPL-ACNC: 1.55 UIU/ML
VIT B12 SERPL-MCNC: 1301 PG/ML
WBC # FLD AUTO: 7.15 K/UL

## 2024-01-02 ENCOUNTER — APPOINTMENT (OUTPATIENT)
Dept: CT IMAGING | Facility: CLINIC | Age: 54
End: 2024-01-02
Payer: MEDICAID

## 2024-01-02 ENCOUNTER — OUTPATIENT (OUTPATIENT)
Dept: OUTPATIENT SERVICES | Facility: HOSPITAL | Age: 54
LOS: 1 days | End: 2024-01-02
Payer: MEDICAID

## 2024-01-02 DIAGNOSIS — Z00.8 ENCOUNTER FOR OTHER GENERAL EXAMINATION: ICD-10-CM

## 2024-01-02 DIAGNOSIS — Z96.652 PRESENCE OF LEFT ARTIFICIAL KNEE JOINT: Chronic | ICD-10-CM

## 2024-01-02 DIAGNOSIS — Z98.890 OTHER SPECIFIED POSTPROCEDURAL STATES: Chronic | ICD-10-CM

## 2024-01-02 DIAGNOSIS — R91.8 OTHER NONSPECIFIC ABNORMAL FINDING OF LUNG FIELD: ICD-10-CM

## 2024-01-02 PROCEDURE — 71250 CT THORAX DX C-: CPT

## 2024-01-02 PROCEDURE — 71250 CT THORAX DX C-: CPT | Mod: 26

## 2024-01-09 DIAGNOSIS — J98.4 OTHER DISORDERS OF LUNG: ICD-10-CM

## 2024-02-15 ENCOUNTER — APPOINTMENT (OUTPATIENT)
Dept: BARIATRICS | Facility: CLINIC | Age: 54
End: 2024-02-15
Payer: MEDICAID

## 2024-02-15 VITALS
BODY MASS INDEX: 38.76 KG/M2 | HEART RATE: 71 BPM | HEIGHT: 64 IN | DIASTOLIC BLOOD PRESSURE: 90 MMHG | TEMPERATURE: 98.2 F | SYSTOLIC BLOOD PRESSURE: 130 MMHG | WEIGHT: 227 LBS | OXYGEN SATURATION: 97 %

## 2024-02-15 DIAGNOSIS — R63.8 OTHER SYMPTOMS AND SIGNS CONCERNING FOOD AND FLUID INTAKE: ICD-10-CM

## 2024-02-15 DIAGNOSIS — E46 UNSPECIFIED PROTEIN-CALORIE MALNUTRITION: ICD-10-CM

## 2024-02-15 PROCEDURE — 99214 OFFICE O/P EST MOD 30 MIN: CPT

## 2024-02-15 NOTE — HISTORY OF PRESENT ILLNESS
[Procedure: ___] : Procedure performed: [unfilled]  [Date of Surgery: ___] : Date of Surgery:   [unfilled] [Surgeon Name:   ___] : Surgeon Name: Dr. HERNANDEZ [Pre-Op Weight ___] : Pre-op weight was [unfilled] lbs [de-identified] : 53-year-old man s/p Laparoscopic Sleeve Gastrectomy presents for follow-up. Weight stable since last visit; on Ozempic (by Endocrinology). Reports no abdominal pain, nausea/vomiting, constipation, diarrhea, reflux/heartburn (on Omeprazole 20 mg every other day). Patient eating 3 protein-rich meals/day (eats pasta and beef), is trying to drink adequate zero-calorie fluids/day (reports drinking ~32 oz water/day), taking bariatric vitamins, and doing daily activity for exercise.  Last nutritionist appt was 9/21/23

## 2024-02-15 NOTE — ASSESSMENT
[FreeTextEntry1] : 53-year-old man s/p Laparoscopic Sleeve Gastrectomy presents for follow-up. Weight stable since last visit. Slightly elevated blood pressure at today's visit (on Losartan). Nutrition and exercise guidelines reviewed with the patient.   Last labs (12/2023) reviewed with the pt: Slightly elevated b-12 (1301 pg/mL) and elevated HbA1c (6.1%)  Continue 3 protein-focused meals/day (aim for 60 g - 70 g protein/day); try to choose lean meats (e.g. chicken, turkey, fish, bison); try to avoid woodard, sausage, Spam, and cheese; try to avoid/limit pasta/starch; increase vegetable consumption Increase zero calorie fluid intake (64 oz/day) Continue bariatric vitamins; hold additional b-12 supplementation Continue Omeprazole 20 mg every other day Exercise with cardio (aim for 8k-10k steps/day) and start strength training 2-3x/week Use step counter Weigh yourself 1x-2x/week Try the Calm john or Soothing Pod john for stress management Will send letter to pt's Endocrinologist to discuss increasing Ozempic dose from 0.25 mg/dose or switching pt from Ozempic to Mounjaro due to elevated HbA1c on last blood work. Follow-up with PCP regarding elevated blood pressure Follow-up with nutritionist (keep a food log) Complete blood work 1-2 weeks prior to next visit Follow-up in August All questions answered   Call with any questions or concerns   Time before and after visit spent reviewing chart

## 2024-02-16 ENCOUNTER — NON-APPOINTMENT (OUTPATIENT)
Age: 54
End: 2024-02-16

## 2024-02-16 ENCOUNTER — APPOINTMENT (OUTPATIENT)
Dept: CARDIOLOGY | Facility: CLINIC | Age: 54
End: 2024-02-16
Payer: MEDICAID

## 2024-02-16 ENCOUNTER — APPOINTMENT (OUTPATIENT)
Dept: PULMONOLOGY | Facility: CLINIC | Age: 54
End: 2024-02-16
Payer: MEDICAID

## 2024-02-16 VITALS
HEIGHT: 64 IN | BODY MASS INDEX: 39.27 KG/M2 | DIASTOLIC BLOOD PRESSURE: 79 MMHG | HEART RATE: 64 BPM | RESPIRATION RATE: 16 BRPM | SYSTOLIC BLOOD PRESSURE: 125 MMHG | TEMPERATURE: 98 F | WEIGHT: 230 LBS | OXYGEN SATURATION: 95 %

## 2024-02-16 VITALS
HEART RATE: 76 BPM | BODY MASS INDEX: 38.41 KG/M2 | RESPIRATION RATE: 16 BRPM | WEIGHT: 225 LBS | TEMPERATURE: 97.1 F | SYSTOLIC BLOOD PRESSURE: 126 MMHG | DIASTOLIC BLOOD PRESSURE: 70 MMHG | OXYGEN SATURATION: 98 % | HEIGHT: 64 IN

## 2024-02-16 DIAGNOSIS — J45.909 UNSPECIFIED ASTHMA, UNCOMPLICATED: ICD-10-CM

## 2024-02-16 DIAGNOSIS — G47.33 OBSTRUCTIVE SLEEP APNEA (ADULT) (PEDIATRIC): ICD-10-CM

## 2024-02-16 DIAGNOSIS — R09.82 POSTNASAL DRIP: ICD-10-CM

## 2024-02-16 DIAGNOSIS — R91.8 OTHER NONSPECIFIC ABNORMAL FINDING OF LUNG FIELD: ICD-10-CM

## 2024-02-16 DIAGNOSIS — E55.9 VITAMIN D DEFICIENCY, UNSPECIFIED: ICD-10-CM

## 2024-02-16 DIAGNOSIS — E66.9 OBESITY, UNSPECIFIED: ICD-10-CM

## 2024-02-16 DIAGNOSIS — Z98.84 BARIATRIC SURGERY STATUS: ICD-10-CM

## 2024-02-16 DIAGNOSIS — R00.1 BRADYCARDIA, UNSPECIFIED: ICD-10-CM

## 2024-02-16 DIAGNOSIS — K21.9 GASTRO-ESOPHAGEAL REFLUX DISEASE W/OUT ESOPHAGITIS: ICD-10-CM

## 2024-02-16 DIAGNOSIS — R06.02 SHORTNESS OF BREATH: ICD-10-CM

## 2024-02-16 PROCEDURE — 94010 BREATHING CAPACITY TEST: CPT

## 2024-02-16 PROCEDURE — 95012 NITRIC OXIDE EXP GAS DETER: CPT

## 2024-02-16 PROCEDURE — 99214 OFFICE O/P EST MOD 30 MIN: CPT | Mod: 25

## 2024-02-16 PROCEDURE — 93000 ELECTROCARDIOGRAM COMPLETE: CPT

## 2024-02-16 PROCEDURE — 94727 GAS DIL/WSHOT DETER LNG VOL: CPT

## 2024-02-16 PROCEDURE — 94729 DIFFUSING CAPACITY: CPT

## 2024-02-16 PROCEDURE — G2211 COMPLEX E/M VISIT ADD ON: CPT | Mod: NC,1L

## 2024-02-16 NOTE — DISCUSSION/SUMMARY
[FreeTextEntry1] : This is a 53-year-old male with past medical history significant for status post gastric sleeve surgery 2021,, non-insulin-dependent diabetes mellitus, reactive airway disease, sleep apnea, asthma, status post COVID-19 2022, dyspepsia, comes in for cardiac follow up evaluation. He denies chest pain, shortness of breath, dizziness or syncope. His cardiac risk factors include borderline hypertension, hyperlipidemia, non-insulin-dependent diabetes mellitus, and history of smoking. He does not drink excessive caffeine or alcohol.  He has no history of rheumatic fever. He has lost approximately 70 pounds at the bariatric surgery. Electrocardiogram done February 16, 2024 demonstrates sinus bradycardia rate of 55 bpm is otherwise unremarkable. The patient is currently taking Ozempic 0.25 mg weekly for his diabetes with the hope of additional weight loss. I have asked him to speak with his endocrinologist regarding increasing the dosage. Electrocardiogram done María 15, 2020 demonstrates sinus bradycardia rate of 51 bpm is otherwise unremarkable. I have also recommend the patient work with a registered dietitian.  He will have new blood work done for electrolytes and lipid profile. The patient had a normal exercise stress test September 9, 2019. The patient had a normal nuclear stress test done November 4, 2019 which demonstrated an estimated ejection fraction is 60%. He will schedule an echo Doppler examination to evaluate his valvular disease, left ventricular function, chamber size, and rule out hypertrophy. The patient continues to lose weight.  His hemoglobin A1c has improved. He is instructed to followup the regarding his overall medical care.  The patient understands that aerobic exercises must be increased to 40 minutes 4 times per week. A detailed discussion of lifestyle modification was done today. The patient has a good understanding of the diagnosis, and treatment plan. Lifestyle modification was also outlined.

## 2024-02-16 NOTE — PROCEDURE
[FreeTextEntry1] : Sleep study (10/02/2023) revealed moderate sleep apnea with an AHI/SELENA of 28.4, and a low oxygen saturation of 79%   Full PFT reveals normal flows; FEV1 was 3.09L which is 106% of predicted; mildly reduced lung volumes; normal diffusion at 23.4, which is 90% of predicted; normal flow volume loop. PFTs were performed to evaluate for SOB  FENO was 11; a normal value being less than 25 Fractional exhaled nitric oxide (FENO) is regarded as a simple, noninvasive method for assessing eosinophilic airway inflammation. Produced by a variety of cells within the lung, nitric oxide (NO) concentrations are generally low in healthy individuals. However, high concentrations of NO appear to be involved in nonspecific host defense mechanisms and chronic inflammatory diseases such as asthma. The American Thoracic Society (ATS) therefore has recommended using FENO to aid in the diagnosis and monitoring of eosinophilic airway inflammation and asthma, and for identifying steroid responsive individuals whose chronic respiratory symptoms may be caused by airway inflammation.

## 2024-02-16 NOTE — ASSESSMENT
[FreeTextEntry1] : Prior note nurse practitioner Obdulia July 20, 2021::  This is a 50 year year old male here today for follow up cardiac followup evaluation.  He has a past medical history significant for  status post gastric sleeve surgery, non-insulin-dependent diabetes mellitus, reactive airway disease, sleep apnea, asthma.  Today he is feeling generally well and does not have any complaints at this time. He follows up closely with his PCP and his pulmonary doctor in regards to his DM and his reactive airway disease.   -Cardiac risk factors include history of smoking, diabetes, HTN, HLD.  -BP is well controlled in today's visit and patient is on Losartan 100 mg PO Daily and Steglarto 15mg PO DAILY.  -He states that he is working on proper diet and exercise and would like to work on weight loss. He states that he was doing well s/p gastric sleeve sx but feels like he may have "fallen off a little bit". I have offered he see our dietitian for better diet management.   -New blood work was done 7/20/2021 to evaluate lipid panel. LDL should be at goal of 50 or less. He is Diabetic and is following up with his endocrinologist for management. -He is on Zetia 10mg  PO Daily and on Rosuvastatin 20mg PO DAILY   DIABETIC CONTROL: I will advise the patient to keep try to stay on a low carbohydrate diet lose weight and exercise to reduce spikes in their blood sugar.  The importance of monitoring blood sugar regularly and HgBA1c level were reviewed. I have also discussed annual eye exams to prevent blindness,  monitoring urine microalbumin regularly to check for kidney damage and the importance of proper foot care and regularly checking feet to prevent sores and possibly loss of limbs was reviewed.   TESTING: -EKG done 7/20/2021 which demonstrated regular sinus Bradycardia rhythm with nonspecific ST-T wave changes BPM of 51 BPM.   -The patient had a normal exercise stress test September 9, 2019. -The patient had a normal nuclear stress test done November 4, 2019 which demonstrated an estimated ejection fraction is 60%.  -Echocardiogram done 1/8/2021 demonstrated mild mitral tricuspid and pulmonic regurgitation with normal left ventricular systolic function. EF of 65%.  PLAN: -He will continue with his usual medications and will contact the office if he is having any complaints between now and their next follow up appointment. -Diabetes education provided. -He follows up with his pulmonologist Dr. Min for management of his reactive airway disease.  I have discussed the plan of care with Mr. FABIANO BENOIT and he will follow up in 3 months. He is compliant with all of his  medications.  The patient understands that aerobic exercises must be increased to minutes 4 times/week and a detailed discussion of lifestyle modification was done today.  The patient has a good understanding of the diagnosis, treatment plan and lifestyle modification. He will contact me at the office for any questions with their care or any changes in their health status.  The plan of care was discussed with supervising physician, Dr. Frias while present in the office at the time of the visit.   Rodrigo TALAVERA

## 2024-02-16 NOTE — REASON FOR VISIT
[CV Risk Factors and Non-Cardiac Disease] : CV risk factors and non-cardiac disease [Follow-Up - Clinic] : a clinic follow-up of [Hyperlipidemia] : hyperlipidemia [Hypertension] : hypertension [Mitral Regurgitation] : mitral regurgitation [FreeTextEntry1] : NIDDM

## 2024-02-16 NOTE — ASSESSMENT
[FreeTextEntry1] : Mr. Villegas is a 53 year old male with a history of asthma, allergy, GERD, HTN, diabetes, obesity- s/p bariatric surgery (7/2018) as a mode of weight loss, moderate OSAS which has improved- stable- still working on weight (Ozempic 1/2024)  His shortness of breath (improved) is felt to be related to: -overweight/out of shape -poor breathing mechanics -asthma -cardiac at risk  History of a chronic cough felt to be related to: -asthma -post nasal drip syndrome -secondary GERD  problem 1: asthma (stable) -MCT reveals positivity for asthma -continue Breo Ellipta 200 1 inhalation QD -continue Ventolin as a rescue inhaler 2 inhalations up to QiD -Inhaler technique reviewed as well as oral hygiene techniques reviewed with patient. Avoidance of cold air, extremes of temperature, rescue inhaler should be used before exercise. Order of medication reviewed with patient. Recommended use of a cool mist humidifier in the bedroom. Instructed to gargle and spit after inhaler use. -Asthma is believed to be caused by inherited (genetic) and environmental factor, but its exact cause is unknown. Asthma may be triggered by allergens, lung infections, or irritants in the air. Asthma triggers are different for each person  problem 2: post nasal drip syndrome/allergies (quiet) -continue Qnasl 1 inhalation per nostril BID -continue Clarinex 5 mg QHS -continue Olopatadine (0.6) 1 sniff/nostril BID -Environmental measures for allergies were encouraged including mattress and pillow cover, air purifier, and environmental controls.  problem 3: r/o allergen profile -allergy blood work: food IgE level, asthma profile, eosinophil level, IgE level, and vitamin D level (noncompliant)  problem 4: GERD  -continue Omeprazole 40 mg QAM, pre-meal  -Rule of 2s: avoid eating too much, eating too late, eating too spicy, eating two hours before bed -Things to avoid including overeating, spicy foods, tight clothing, eating within three hours of bed, this list is not all inclusive. -For treatment of reflux, possible options discussed including diet control, H2 blockers, PPIs, as well as coating motility agents discussed as treatment options. Timing of meals and proximity of last meal to sleep were discussed. If symptoms persist, a formal gastrointestinal evaluation is needed.  problem 5: positive NADINE (moderate)- AHI 28.4 -unable to tolerate CPAP -s/p repeat Home sleep study - (now moderate) -secondary sleep study reveals CPAP of 14 cmH2O- s/p follow up study for re-evaluation (moderate 10/2023) -complete fitting for DD -recommended to consider Oral Appliance -recommended to use "Chin Strap" -reassess for dental device when at his target weight -Sleep apnea is associated with adverse clinical consequences which an affect most organ systems. Cardiovascular disease risk includes arrhythmias, atrial fibrillation, hypertension, coronary artery disease, and stroke. Metabolic disorders include diabetes type 2, non-alcoholic fatty liver disease. Mood disorder especially depression; and cognitive decline especially in the elderly. Associations with chronic reflux/Anguiano's esophagus some but not all inclusive. -Reasons to assess this include arousal consistent with hypopnea; respiratory events most prominent in REM sleep or supine position; therefore sleep staging and body position are important for accurate diagnosis and estimation of AHI. -Treatment options discussed including CPAP/BiPAP machine, oral appliance, ProVent therapy, Oxy-Aid by Respitec, new technologies, or positional sleep.Recommended use of the CPAP machine for moderate (AHI >15), moderate to severe (AHI 15-30) and severe patients (AHI > 30). Recommended weight loss which can reduce AHI especially in weight loss of greater than 5% of BMI. Positional sleep is recommended in those with low AHI, low-moderate BMI, and younger age. For severe sleep apnea, the hypoglossal nerve stimulator was recommended as well.  problem 6: obesity/out of shape -(improved)- Kannan (on Ozempic 1/2024) -recommended Berberine OTC supplement for visceral fat loss  -recommended "MUNIQ" OTC supplement -recommended to read "Bright Spots [employer] Land Mines" , Engine 2" and "Obesity Code" -Weight loss, exercise, and diet control were discussed and are highly encouraged. Treatment options were given such as, aqua therapy, and contacting a nutritionist. Recommended to use the elliptical, stationary bike, less use of treadmill. Obesity is associated with worsening asthma, shortness of breath, and potential for cardiac disease, diabetes, and other underlying medical conditions.  problem 7: poor breathing mechanics -Recommended Allyssa Mcmahan and Buteyko breathing techniques -Proper breathing techniques were reviewed with an emphasis of exhalation. Patient instructed to breath in for 1 second and out for four seconds. Patient was encouraged to not talk while walking.  Problem 8: Health Maintenance/COVID19 Precautions -Complete Covid 19 antibody blood work -s/p Pfizer COVID 19 vaccine x 3 -Covid 19 vaccine discussed at length with patient; booster - Clean your hands often. Wash your hands often with soap and water for at least 20 seconds, especially after blowing your nose, coughing, or sneezing, or having been in a public place. - If soap and water are not available, use a hand  that contains at least 60% alcohol. - To the extent possible, avoid touching high-touch surfaces in public places - elevator buttons, door handles, handrails, handshaking with people, etc. Use a tissue or your sleeve to cover your hand or finger if you must touch something. - Wash your hands after touching surfaces in public places. - Avoid touching your face, nose, eyes, etc. - Clean and disinfect your home to remove germs: practice routine cleaning of frequently touched surfaces (for example: tables, doorknobs, light switches, handles, desks, toilets, faucets, sinks & cell phones) - Avoid crowds, especially in poorly ventilated spaces. Your risk of exposure to respiratory viruses like COVID-19 may increase in crowded, closed-in settings with little air circulation if there are people in the crowd who are sick. All patients are recommended to practice social distancing and stay at least 6 feet away from others. - Avoid all non-essential travel including plane trips, and especially avoid embarking on cruise ships. -If COVID-19 is spreading in your community, take extra measures to put distance between yourself and other people to further reduce your risk of being exposed to this new virus. -Stay home as much as possible. - Consider ways of getting food brought to your house through family, social, or commercial networks -Be aware that the virus has been known to live in the air up to 3 hours post exposure, cardboard up to 24 hours post exposure, copper up to 4 hours post exposure, steel and plastic up to 2-3 days post exposure. Risk of transmission from these surfaces are affected by many variables. COVID-19 precautionary Immune Support Recommendations: -OTC Vitamin C 500mg BID -OTC Quercetin 250-500mg BID -OTC Zinc 75-100mg per day -OTC Melatonin 1 or 2mg a night -OTC Vitamin D 1-4000mg per day -OTC Tonic Water 8oz per day -Water 0.5-1 gallon per day Asthma and COVID19: You need to make sure your asthma is under control. This often requires the use of inhaled corticosteroids (and sometimes oral corticosteroids). Inhaled corticosteroids do not likely reduce your immune system's ability to fight infections, but oral corticosteroids may. It is important to use the steps above to protect yourself to limit your exposure to any respiratory virus.  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 Risks, benefits were reviewed in detail. The guidelines for abnormalities include follow up CT scans at various intervals which could range from 6 weeks to 1 year intervals. If there is a change for the worse then consideration for a biopsy will be considered if the patient is a candidate. Second opinion evaluation with a thoracic surgeon or an interventional radiologist could be offered.   problem 10: health maintenance -received flu shot - 2023 -recommended strep pneumonia vaccines: Prevnar-13 vaccine, followed by Pneumo vaccine 23 one year following -recommended early intervention for URIs -recommended regular osteoporosis evaluations -recommended early dermatological evaluations -recommended after the age of 50 to the age of 70, colonoscopy every 5 years  Follow up in 4 months with full spirometry and NiOx testing He is encouraged to call with any changes, concerns, or questions.

## 2024-02-16 NOTE — HISTORY OF PRESENT ILLNESS
[FreeTextEntry1] : Mr. Villegas is a 53 year old male presenting to the office for a follow-up pulmonary evaluation for asthma, chronic cough, GERD, NADINE, postnasal drip, sleep-disordered breathing, snoring, low vitamin D and shortness of breath. His chief complaint is   -he notes feeling generally well  -he denies any URIs this winter -he notes bowels are regular  -he notes GERD is quiet on Omeprazole qOD -he notes he now wears reading glasses. He started using them within the past 1-2 years -he denies exercising beyond walking -he denies dysphonia  -he notes he woke up one morning with Xerostomia/"cotton mouth" -he notes appetite is stable  -he denies back or neck pain -he notes weight is stable  -he notes good quality of sleep  -he notes sleeping for 6 hours -he notes his HgbA1C is 6.1, and he's on Rx. He started Ozempic 1/2024, and he bruises at the injection site -he notes mild PNDrip  -he denies any headaches, nausea, emesis, fever, chills, sweats, chest pain, chest pressure, coughing, wheezing, palpitations, diarrhea, constipation, dysphagia, vertigo, arthralgias, myalgias, leg swelling, itchy eyes, itchy ears, heartburn, reflux, or sour taste in the mouth.

## 2024-02-16 NOTE — PHYSICAL EXAM
[No Acute Distress] : no acute distress [Normal Oropharynx] : normal oropharynx [III] : Mallampati Class: III [Normal Appearance] : normal appearance [No Neck Mass] : no neck mass [Normal Rate/Rhythm] : normal rate/rhythm [Normal S1, S2] : normal s1, s2 [No Murmurs] : no murmurs [No Resp Distress] : no resp distress [Clear to Auscultation Bilaterally] : clear to auscultation bilaterally [No Abnormalities] : no abnormalities [Benign] : benign [Normal Gait] : normal gait [No Clubbing] : no clubbing [No Edema] : no edema [No Cyanosis] : no cyanosis [FROM] : FROM [Normal Color/ Pigmentation] : normal color/ pigmentation [No Focal Deficits] : no focal deficits [Oriented x3] : oriented x3 [Normal Affect] : normal affect [TextBox_2] : OW [TextBox_68] : I:E 1:3; Clear

## 2024-02-16 NOTE — PHYSICAL EXAM
[Well Developed] : well developed [Well Nourished] : well nourished [No Acute Distress] : no acute distress [Normal Venous Pressure] : normal venous pressure [No Carotid Bruit] : no carotid bruit [Normal S1, S2] : normal S1, S2 [No Murmur] : no murmur [No Rub] : no rub [Clear Lung Fields] : clear lung fields [Good Air Entry] : good air entry [No Respiratory Distress] : no respiratory distress  [Soft] : abdomen soft [Non Tender] : non-tender [No Masses/organomegaly] : no masses/organomegaly [Normal Bowel Sounds] : normal bowel sounds [Normal Gait] : normal gait [No Edema] : no edema [No Cyanosis] : no cyanosis [No Clubbing] : no clubbing [No Varicosities] : no varicosities [No Rash] : no rash [No Skin Lesions] : no skin lesions [Moves all extremities] : moves all extremities [No Focal Deficits] : no focal deficits [Normal Speech] : normal speech [Alert and Oriented] : alert and oriented [Normal memory] : normal memory [General Appearance - Well Developed] : well developed [Normal Appearance] : normal appearance [Well Groomed] : well groomed [General Appearance - Well Nourished] : well nourished [No Deformities] : no deformities [General Appearance - In No Acute Distress] : no acute distress [Normal Conjunctiva] : the conjunctiva exhibited no abnormalities [Normal Oral Mucosa] : normal oral mucosa [Normal Oropharynx] : normal oropharynx [Normal Jugular Venous A Waves Present] : normal jugular venous A waves present [Normal Jugular Venous V Waves Present] : normal jugular venous V waves present [No Jugular Venous Milian A Waves] : no jugular venous milian A waves [Respiration, Rhythm And Depth] : normal respiratory rhythm and effort [Exaggerated Use Of Accessory Muscles For Inspiration] : no accessory muscle use [Auscultation Breath Sounds / Voice Sounds] : lungs were clear to auscultation bilaterally [Bowel Sounds] : normal bowel sounds [Abdomen Soft] : soft [Abnormal Walk] : normal gait [Gait - Sufficient For Exercise Testing] : the gait was sufficient for exercise testing [Nail Clubbing] : no clubbing of the fingernails [Cyanosis, Localized] : no localized cyanosis [Petechial Hemorrhages (___cm)] : no petechial hemorrhages [Skin Color & Pigmentation] : normal skin color and pigmentation [Skin Turgor] : normal skin turgor [] : no rash [No Venous Stasis] : no venous stasis [Skin Lesions] : no skin lesions [Oriented To Time, Place, And Person] : oriented to person, place, and time [Impaired Insight] : insight and judgment were intact [Affect] : the affect was normal [Mood] : the mood was normal [No Anxiety] : not feeling anxious [5th Left ICS - MCL] : palpated at the 5th LICS in the midclavicular line [Normal] : normal [No Precordial Heave] : no precordial heave was noted [Apical Thrill] : no thrill palpable at the apex [Normal Rate] : normal [Heart Rate ___] : [unfilled] bpm [Rhythm Regular] : regular [Normal S1] : normal S1 [Normal S2] : normal S2 [No Gallop] : no gallop heard [S3] : no S3 [S4] : no S4 [Click] : no click [Pericardial Rub] : no pericardial rub [I] : a grade 1 [2+] : left 2+ [No Abnormalities] : the abdominal aorta was not enlarged and no bruit was heard [No Pitting Edema] : no pitting edema present [Rt] : no varicose veins of the right leg [Lt] : no varicose veins of the left leg

## 2024-02-16 NOTE — ADDENDUM
[FreeTextEntry1] : Documented by Tatyana Noble acting as a scribe for Dr. Rufus Min on 02/16/2024. All medical record entries made by the Scribe were at my, Dr. Rufus Min's, direction and personally dictated by me on 02/16/2024. I have reviewed the chart and agree that the record accurately reflects my personal performance of the history, physical exam, assessment and plan. I have also personally directed, reviewed, and agree with the discharge instructions.

## 2024-02-16 NOTE — CARDIOLOGY SUMMARY
[de-identified] : 7/20/2021 [de-identified] : NUC: 11/4/2019 [de-identified] : 9/9/2019 [de-identified] : MAYELA: 9/15/2020\par   [Normal] : normal [___] : [unfilled]

## 2024-03-26 ENCOUNTER — APPOINTMENT (OUTPATIENT)
Dept: ENDOCRINOLOGY | Facility: CLINIC | Age: 54
End: 2024-03-26
Payer: MEDICAID

## 2024-03-26 VITALS
DIASTOLIC BLOOD PRESSURE: 81 MMHG | OXYGEN SATURATION: 97 % | HEIGHT: 64 IN | WEIGHT: 228 LBS | BODY MASS INDEX: 38.93 KG/M2 | HEART RATE: 64 BPM | SYSTOLIC BLOOD PRESSURE: 145 MMHG

## 2024-03-26 PROCEDURE — G2211 COMPLEX E/M VISIT ADD ON: CPT | Mod: NC,1L

## 2024-03-26 PROCEDURE — 99214 OFFICE O/P EST MOD 30 MIN: CPT

## 2024-03-26 RX ORDER — METFORMIN ER 500 MG 500 MG/1
500 TABLET ORAL
Qty: 4 | Refills: 2 | Status: ACTIVE | COMMUNITY
Start: 1900-01-01 | End: 1900-01-01

## 2024-03-26 NOTE — ASSESSMENT
[FreeTextEntry1] : 53 year old male with past medical history of NADINE, Diabetes Mellitus Type 2, s/p Gastric Sleeve who presents for management of his diabetes  1. DM 2- controlled, uncomplicated Patient counseled on the importance of diabetic control and complications. Patient's diet and the necessary changes discussed. Reviewed over glycemic goals. Inc to Ozempic 0.5 mg Qweekly and then he will call in 2 months for 1 mg  If no weight loss will switch to mounjaro Check fsg Cont diabetic diet Cont exercise Labs UTD Opthalmology Visit up to date Foot Exam up to date  2. HLD- elevated Cont Rosuvastatin and Zetia  3. Pituitary Microadenoma Resolved

## 2024-03-26 NOTE — HISTORY OF PRESENT ILLNESS
[FreeTextEntry1] : Mr. FABIANO BENOIT  is a 53 year old male with past medical history of NADINE, Diabetes Mellitus Type 2, s/p Gastric Sleeve, Pituitary microadenoma who presents for management of his diabetes.    POCT Glucose:  mg/dL POCT Hga1c: %   Diabetes first diagnosed: 2004 Type: 2  Current diabetic regimen: Metformin mg 1000 mg twice daily Ozempic 0.25 mg Qweekly Steglatro 5 mg QD (stopped)  Other relevant medications: Losartan Rosuvastatin 20 mg QD Zetia Self monitoring blood glucose : 1x times per day:  SMBG: Pre-breakfast:  Pre-lunch: Pre-dinner: bedtime:  Hypoglycemia: denies   Diet: Has not been is best control  Exercise:none  Urine micro:WNL (1/2021) lipid profile:  (1/2021) last hba1c:6.1% (11/2024), 5.6% (5/2021), 5.7% (1/2021), 5.9% (6/2020) eye exam: 2/2021 diabetic foot exam/podiatry:in office (11/2023)  Re Pediatric Microadenoma He was incidentally found to have the microadenoma when he was evaluated for low testosterone. Last MRI was in 2018. There was a finding of a 3.6 x 3.1 mm. Last MRI showed no adenoma.

## 2024-04-09 LAB
25(OH)D3 SERPL-MCNC: 66.1 NG/ML
ALBUMIN SERPL ELPH-MCNC: 4.2 G/DL
ALP BLD-CCNC: 80 U/L
ALT SERPL-CCNC: 18 U/L
ANION GAP SERPL CALC-SCNC: 14 MMOL/L
AST SERPL-CCNC: 18 U/L
BASOPHILS # BLD AUTO: 0.04 K/UL
BASOPHILS NFR BLD AUTO: 0.6 %
BILIRUB SERPL-MCNC: 0.5 MG/DL
BUN SERPL-MCNC: 21 MG/DL
CALCIUM SERPL-MCNC: 9.8 MG/DL
CHLORIDE SERPL-SCNC: 103 MMOL/L
CO2 SERPL-SCNC: 24 MMOL/L
CREAT SERPL-MCNC: 1.02 MG/DL
EGFR: 87 ML/MIN/1.73M2
EOSINOPHIL # BLD AUTO: 0.55 K/UL
EOSINOPHIL NFR BLD AUTO: 8.5 %
ESTIMATED AVERAGE GLUCOSE: 120 MG/DL
FOLATE SERPL-MCNC: 11.1 NG/ML
GLUCOSE SERPL-MCNC: 91 MG/DL
HBA1C MFR BLD HPLC: 5.8 %
HCT VFR BLD CALC: 43.6 %
HGB BLD-MCNC: 13.7 G/DL
IMM GRANULOCYTES NFR BLD AUTO: 0.3 %
IRON SERPL-MCNC: 95 UG/DL
LYMPHOCYTES # BLD AUTO: 1.98 K/UL
LYMPHOCYTES NFR BLD AUTO: 30.6 %
MAN DIFF?: NORMAL
MCHC RBC-ENTMCNC: 26.3 PG
MCHC RBC-ENTMCNC: 31.4 GM/DL
MCV RBC AUTO: 83.8 FL
MONOCYTES # BLD AUTO: 0.43 K/UL
MONOCYTES NFR BLD AUTO: 6.6 %
NEUTROPHILS # BLD AUTO: 3.46 K/UL
NEUTROPHILS NFR BLD AUTO: 53.4 %
PLATELET # BLD AUTO: 226 K/UL
POTASSIUM SERPL-SCNC: 4.6 MMOL/L
PREALB SERPL NEPH-MCNC: 23 MG/DL
PROT SERPL-MCNC: 6.6 G/DL
RBC # BLD: 5.2 M/UL
RBC # FLD: 15.6 %
SODIUM SERPL-SCNC: 142 MMOL/L
VIT B12 SERPL-MCNC: 1402 PG/ML
WBC # FLD AUTO: 6.48 K/UL

## 2024-04-12 ENCOUNTER — APPOINTMENT (OUTPATIENT)
Dept: CARDIOLOGY | Facility: CLINIC | Age: 54
End: 2024-04-12
Payer: MEDICAID

## 2024-04-12 LAB — ZINC SERPL-MCNC: 87 UG/DL

## 2024-04-12 PROCEDURE — 93306 TTE W/DOPPLER COMPLETE: CPT

## 2024-04-22 LAB
VIT A SERPL-MCNC: 49.6 UG/DL
VIT B1 SERPL-MCNC: 189.8 NMOL/L

## 2024-06-03 ENCOUNTER — APPOINTMENT (OUTPATIENT)
Dept: ENDOCRINOLOGY | Facility: CLINIC | Age: 54
End: 2024-06-03
Payer: MEDICAID

## 2024-06-03 ENCOUNTER — APPOINTMENT (OUTPATIENT)
Dept: ORTHOPEDIC SURGERY | Facility: CLINIC | Age: 54
End: 2024-06-03
Payer: MEDICAID

## 2024-06-03 VITALS
HEIGHT: 64 IN | HEART RATE: 58 BPM | OXYGEN SATURATION: 95 % | DIASTOLIC BLOOD PRESSURE: 85 MMHG | BODY MASS INDEX: 38.24 KG/M2 | SYSTOLIC BLOOD PRESSURE: 131 MMHG | WEIGHT: 224 LBS

## 2024-06-03 VITALS
HEART RATE: 67 BPM | SYSTOLIC BLOOD PRESSURE: 137 MMHG | WEIGHT: 224 LBS | HEIGHT: 64 IN | BODY MASS INDEX: 38.24 KG/M2 | DIASTOLIC BLOOD PRESSURE: 84 MMHG

## 2024-06-03 DIAGNOSIS — M17.11 UNILATERAL PRIMARY OSTEOARTHRITIS, RIGHT KNEE: ICD-10-CM

## 2024-06-03 PROCEDURE — G2211 COMPLEX E/M VISIT ADD ON: CPT | Mod: NC

## 2024-06-03 PROCEDURE — 99214 OFFICE O/P EST MOD 30 MIN: CPT

## 2024-06-03 PROCEDURE — 73560 X-RAY EXAM OF KNEE 1 OR 2: CPT | Mod: RT

## 2024-06-03 PROCEDURE — 99213 OFFICE O/P EST LOW 20 MIN: CPT | Mod: 25

## 2024-06-03 RX ORDER — SEMAGLUTIDE 1.34 MG/ML
4 INJECTION, SOLUTION SUBCUTANEOUS
Qty: 3 | Refills: 1 | Status: ACTIVE | COMMUNITY
Start: 2023-12-04 | End: 1900-01-01

## 2024-06-03 RX ORDER — PNV NO.95/FERROUS FUM/FOLIC AC 28MG-0.8MG
TABLET ORAL WEEKLY
Refills: 0 | Status: DISCONTINUED | COMMUNITY
End: 2024-06-03

## 2024-06-03 NOTE — ASSESSMENT
[FreeTextEntry1] : 53 year old male with past medical history of NADINE, Diabetes Mellitus Type 2, s/p Gastric Sleeve who presents for management of his diabetes  1. DM 2- controlled, uncomplicated Patient counseled on the importance of diabetic control and complications. Patient's diet and the necessary changes discussed. Reviewed over glycemic goals. Inc to Ozempic 1 mg Qweekly  If no weight loss will switch to mounjaro Check fsg Cont diabetic diet Cont exercise Labs UTD Opthalmology Visit needs follow up Foot Exam up to date  2. HLD- elevated Cont Rosuvastatin and Zetia  3. Pituitary Microadenoma Resolved

## 2024-06-03 NOTE — HISTORY OF PRESENT ILLNESS
[FreeTextEntry1] : Mr. FABIANO BENOIT  is a 53 year old male with past medical history of NADINE, Diabetes Mellitus Type 2, s/p Gastric Sleeve, Pituitary microadenoma who presents for management of his diabetes.    POCT Glucose:  mg/dL POCT Hga1c: %   Diabetes first diagnosed: 2004 Type: 2  Current diabetic regimen: Metformin mg 1000 mg twice daily Ozempic 0.25 mg Qweekly Steglatro 5 mg QD (stopped)  Other relevant medications: Losartan Rosuvastatin 20 mg QD Zetia Self monitoring blood glucose : 1x times per day:  SMBG: Pre-breakfast:  Pre-lunch: Pre-dinner: bedtime:  Hypoglycemia: denies   Diet: Has not been is best control  Exercise:none  Urine micro:WNL (1/2021) lipid profile: LDL 71 (11/2023),  (1/2021) last hba1c:5.8% (4/2024), 6.1% (11/2024), 5.6% (5/2021), 5.7% (1/2021), 5.9% (6/2020) eye exam: 2/2021 diabetic foot exam/podiatry:in office (11/2023)  Re Pediatric Microadenoma He was incidentally found to have the microadenoma when he was evaluated for low testosterone. Last MRI was in 2018. There was a finding of a 3.6 x 3.1 mm. Last MRI showed no adenoma.

## 2024-06-06 PROBLEM — M17.11 PRIMARY OSTEOARTHRITIS OF RIGHT KNEE: Status: ACTIVE | Noted: 2017-10-31

## 2024-06-06 NOTE — PHYSICAL EXAM
[de-identified] : Right Knee: Knee: Range of Motion in Degrees	 	                  Claimant:	Normal:	 Flexion Active	  135 	                135-degrees	 Flexion Passive	  135	                135-degrees	 Extension Active	  0-5	                0-5-degrees	 Extension Passive	  0-5	                0-5-degrees	  No weakness to flexion/extension.  No evidence of instability in the AP plane or varus or valgus stress.  Negative  Lachman.  Negative pivot shift.  Negative anterior drawer test.  Negative posterior drawer test.  Negative Carlos.  Negative Apley grind.  No medial or lateral joint line tenderness.  Positive tenderness over the medial and lateral facet of the patella.  Positive patellofemoral crepitations.  No lateral tilting patella.  No patella apprehension.  Positive crepitation in the medial and lateral femoral condyle.  No proximal or distal swelling, edema or tenderness.  No gross motor or sensory deficits.  Mild intra-articular swelling.  2+ DP and PT pulses.  No varus or valgus malalignment.  Skin is intact.  No rashes, scars or lesions.    Left Knee: Knee: Range of Motion in Degrees	 	                  Claimant:	Normal:	 Flexion Active	  135 	                135-degrees	 Flexion Passive	  135	                135-degrees	 Extension Active	  0-5	                0-5-degrees	 Extension Passive	  0-5	                0-5-degrees	  No weakness to flexion/extension.  No evidence of instability in the AP plane or varus or valgus stress.  Negative  Lachman.  Negative pivot shift.  Negative anterior drawer test.  Negative posterior drawer test.  Negative Carlos.  Negative Apley grind.  No medial or lateral joint line tenderness.  No tenderness over the medial and lateral facet of the patella.  No patellofemoral crepitations.  No lateral tilting patella.  No patellar apprehension.  No crepitation in the medial and lateral femoral condyle.  No proximal or distal swelling, edema or tenderness.  No gross motor or sensory deficits.  No intra-articular swelling.  2+ DP and PT pulses. No varus or valgus malalignment.  Skin is intact.  No rashes, scars or lesions.    [de-identified] : Gait and Station:  Ambulating with a slightly antalgic to antalgic gait.  Station:  Normal.  [de-identified] : Appearance:  Well-developed, well-nourished male in no acute distress.   [de-identified] : Radiographs, one to two views of the right knee taken in the office today, show moderate degenerative changes.  Review of MRI of the right knee shows no meniscal tear.  It shows osteoarthritic changes.

## 2024-06-06 NOTE — DISCUSSION/SUMMARY
[de-identified] : At this time, due to osteoarthritis of the right knee, the patient will start on a course of physical therapy, anti-inflammatory and be reassessed in 4-6 weeks.

## 2024-06-06 NOTE — ADDENDUM
[FreeTextEntry1] : This note was written by Sally Olson on 06/06/2024 acting as scribe for Jose Victoria III, MD

## 2024-06-06 NOTE — CONSULT LETTER
[Dear  ___] : Dear  [unfilled], [Consult Letter:] : I had the pleasure of evaluating your patient, [unfilled]. [Please see my note below.] : Please see my note below. [Consult Closing:] : Thank you very much for allowing me to participate in the care of this patient.  If you have any questions, please do not hesitate to contact me. [Sincerely,] : Sincerely, [FreeTextEntry3] : Jose Victoria, III, MD

## 2024-06-06 NOTE — HISTORY OF PRESENT ILLNESS
[5] : a current pain level of 5/10 [de-identified] : The patient comes in today with complaints referable to his right knee.  He had an MRI at [facility].  He states this has been going on for several weeks and getting a little worse recently.  The patient states the pain is constant.  The patient describes the pain as sharp. [de-identified] : walking, bending, lying down [de-identified] : rest

## 2024-06-25 ENCOUNTER — LABORATORY RESULT (OUTPATIENT)
Age: 54
End: 2024-06-25

## 2024-06-25 ENCOUNTER — APPOINTMENT (OUTPATIENT)
Dept: CARDIOLOGY | Facility: CLINIC | Age: 54
End: 2024-06-25
Payer: MEDICAID

## 2024-06-25 VITALS
HEART RATE: 68 BPM | SYSTOLIC BLOOD PRESSURE: 127 MMHG | OXYGEN SATURATION: 96 % | WEIGHT: 223 LBS | DIASTOLIC BLOOD PRESSURE: 83 MMHG | BODY MASS INDEX: 37.15 KG/M2 | TEMPERATURE: 98.5 F | HEIGHT: 65 IN | RESPIRATION RATE: 16 BRPM

## 2024-06-25 DIAGNOSIS — I10 ESSENTIAL (PRIMARY) HYPERTENSION: ICD-10-CM

## 2024-06-25 DIAGNOSIS — E66.9 OBESITY, UNSPECIFIED: ICD-10-CM

## 2024-06-25 DIAGNOSIS — E11.9 TYPE 2 DIABETES MELLITUS W/OUT COMPLICATIONS: ICD-10-CM

## 2024-06-25 DIAGNOSIS — E78.5 HYPERLIPIDEMIA, UNSPECIFIED: ICD-10-CM

## 2024-06-25 DIAGNOSIS — R06.09 OTHER FORMS OF DYSPNEA: ICD-10-CM

## 2024-06-25 DIAGNOSIS — I07.1 RHEUMATIC TRICUSPID INSUFFICIENCY: ICD-10-CM

## 2024-06-25 DIAGNOSIS — I35.1 NONRHEUMATIC AORTIC (VALVE) INSUFFICIENCY: ICD-10-CM

## 2024-06-25 PROCEDURE — G2211 COMPLEX E/M VISIT ADD ON: CPT | Mod: NC

## 2024-06-25 PROCEDURE — 93015 CV STRESS TEST SUPVJ I&R: CPT

## 2024-06-25 PROCEDURE — 99213 OFFICE O/P EST LOW 20 MIN: CPT | Mod: 25

## 2024-07-08 ENCOUNTER — APPOINTMENT (OUTPATIENT)
Dept: ORTHOPEDIC SURGERY | Facility: CLINIC | Age: 54
End: 2024-07-08
Payer: MEDICAID

## 2024-07-08 VITALS
BODY MASS INDEX: 37.15 KG/M2 | HEART RATE: 65 BPM | HEIGHT: 65 IN | DIASTOLIC BLOOD PRESSURE: 85 MMHG | WEIGHT: 223 LBS | SYSTOLIC BLOOD PRESSURE: 135 MMHG

## 2024-07-08 DIAGNOSIS — M17.11 UNILATERAL PRIMARY OSTEOARTHRITIS, RIGHT KNEE: ICD-10-CM

## 2024-07-08 PROCEDURE — 99213 OFFICE O/P EST LOW 20 MIN: CPT

## 2024-08-15 ENCOUNTER — APPOINTMENT (OUTPATIENT)
Dept: BARIATRICS | Facility: CLINIC | Age: 54
End: 2024-08-15

## 2024-08-15 ENCOUNTER — APPOINTMENT (OUTPATIENT)
Dept: PULMONOLOGY | Facility: CLINIC | Age: 54
End: 2024-08-15
Payer: MEDICAID

## 2024-08-15 VITALS
SYSTOLIC BLOOD PRESSURE: 120 MMHG | WEIGHT: 222 LBS | HEIGHT: 65 IN | OXYGEN SATURATION: 97 % | BODY MASS INDEX: 36.99 KG/M2 | DIASTOLIC BLOOD PRESSURE: 84 MMHG | HEART RATE: 93 BPM | RESPIRATION RATE: 16 BRPM | TEMPERATURE: 97.3 F

## 2024-08-15 DIAGNOSIS — R91.8 OTHER NONSPECIFIC ABNORMAL FINDING OF LUNG FIELD: ICD-10-CM

## 2024-08-15 DIAGNOSIS — R06.02 SHORTNESS OF BREATH: ICD-10-CM

## 2024-08-15 DIAGNOSIS — E55.9 VITAMIN D DEFICIENCY, UNSPECIFIED: ICD-10-CM

## 2024-08-15 DIAGNOSIS — G47.33 OBSTRUCTIVE SLEEP APNEA (ADULT) (PEDIATRIC): ICD-10-CM

## 2024-08-15 DIAGNOSIS — K21.9 GASTRO-ESOPHAGEAL REFLUX DISEASE W/OUT ESOPHAGITIS: ICD-10-CM

## 2024-08-15 DIAGNOSIS — E66.9 OBESITY, UNSPECIFIED: ICD-10-CM

## 2024-08-15 DIAGNOSIS — J45.909 UNSPECIFIED ASTHMA, UNCOMPLICATED: ICD-10-CM

## 2024-08-15 PROCEDURE — 95012 NITRIC OXIDE EXP GAS DETER: CPT

## 2024-08-15 PROCEDURE — 94010 BREATHING CAPACITY TEST: CPT

## 2024-08-15 PROCEDURE — 99214 OFFICE O/P EST MOD 30 MIN: CPT | Mod: 25

## 2024-08-15 NOTE — PROCEDURE
[FreeTextEntry1] : PFT reveals normal flows; FEV1 was 2.84 L which is 89.9 % of predicted with a normal flow volume loop. PFT's for performed to evaluate for SOB.    FENO was 9; a normal value being less than 25. Fractional exhaled nitric oxide (FENO) is regarded as a simple, noninvasive method for assessing eosinophilic airway inflammation. Produced by a variety of cells within the lung, nitric oxide (NO) concentrations are generally low in healthy individuals. However, high concentrations of NO appear to be involved in nonspecific host defense mechanisms and chronic inflammatory diseases such as asthma. The American Thoracic Society (ATS) therefore has strongly recommended using FENO to aid in the assessment, management, and long-term monitoring of eosinophilic airway inflammation and asthma, and for identifying steroid responsive individuals whose chronic respiratory symptoms may be caused by airway inflammation. In their 2011 clinical practice guideline, the ATS emphasizes the importance of using FENO.

## 2024-08-15 NOTE — ADDENDUM
[FreeTextEntry1] :  Documented by Yonis Kraus acting as a scribe for Dr. Rufus Min on 08/15/2024 .   All medical record entries made by the Scribe were at my, Dr. Rufus Min's direction and personally dictated by me on 08/15/2024 . I have reviewed the chart and agree that the record accurately reflects my personal performance of the history, Physical exam, assessment, and plan. I have also personally directed, reviewed, and agree with the discharge instructions.

## 2024-08-15 NOTE — ASSESSMENT
[FreeTextEntry1] : Mr. Villegas is a 53 year old male with a history of asthma, allergy, GERD, HTN, diabetes, obesity- s/p bariatric surgery (7/2018) as a mode of weight loss, moderate OSAS which has improved- stable- still working on weight (thriving on Ozempic-1/2024)  His shortness of breath (improved) is felt to be related to: -overweight/out of shape -poor breathing mechanics -asthma -cardiac at risk  History of a chronic cough felt to be related to: -asthma -post nasal drip syndrome -secondary GERD  problem 1: asthma (stable) -MCT reveals positivity for asthma -continue Breo Ellipta 200 1 inhalation QD -continue Ventolin as a rescue inhaler 2 inhalations up to QiD -Inhaler technique reviewed as well as oral hygiene techniques reviewed with patient. Avoidance of cold air, extremes of temperature, rescue inhaler should be used before exercise. Order of medication reviewed with patient. Recommended use of a cool mist humidifier in the bedroom. Instructed to gargle and spit after inhaler use. -Asthma is believed to be caused by inherited (genetic) and environmental factor, but its exact cause is unknown. Asthma may be triggered by allergens, lung infections, or irritants in the air. Asthma triggers are different for each person  problem 2: post nasal drip syndrome/allergies (quiet) -continue Qnasl 1 inhalation per nostril BID -continue Clarinex 5 mg QHS -continue Olopatadine (0.6) 1 sniff/nostril BID -Environmental measures for allergies were encouraged including mattress and pillow cover, air purifier, and environmental controls.  problem 3: r/o allergen profile -allergy blood work: food IgE level, asthma profile, eosinophil level, IgE level, and vitamin D level (noncompliant)  problem 4: GERD  -continue Omeprazole 40 mg QAM, pre-meal  -Rule of 2s: avoid eating too much, eating too late, eating too spicy, eating two hours before bed -Things to avoid including overeating, spicy foods, tight clothing, eating within three hours of bed, this list is not all inclusive. -For treatment of reflux, possible options discussed including diet control, H2 blockers, PPIs, as well as coating motility agents discussed as treatment options. Timing of meals and proximity of last meal to sleep were discussed. If symptoms persist, a formal gastrointestinal evaluation is needed.  problem 5: positive NADINE (moderate)- AHI 28.4 -unable to tolerate CPAP -s/p repeat Home sleep study - (now moderate) -secondary sleep study reveals CPAP of 14 cmH2O- s/p follow up study for re-evaluation (moderate 10/2023) -complete fitting for DD -recommended to consider Oral Appliance -recommended to use "Chin Strap" -reassess for dental device when at his target weight -Sleep apnea is associated with adverse clinical consequences which an affect most organ systems. Cardiovascular disease risk includes arrhythmias, atrial fibrillation, hypertension, coronary artery disease, and stroke. Metabolic disorders include diabetes type 2, non-alcoholic fatty liver disease. Mood disorder especially depression; and cognitive decline especially in the elderly. Associations with chronic reflux/Anguiano's esophagus some but not all inclusive. -Reasons to assess this include arousal consistent with hypopnea; respiratory events most prominent in REM sleep or supine position; therefore sleep staging and body position are important for accurate diagnosis and estimation of AHI. -Treatment options discussed including CPAP/BiPAP machine, oral appliance, ProVent therapy, Oxy-Aid by Respitec, new technologies, or positional sleep.Recommended use of the CPAP machine for moderate (AHI >15), moderate to severe (AHI 15-30) and severe patients (AHI > 30). Recommended weight loss which can reduce AHI especially in weight loss of greater than 5% of BMI. Positional sleep is recommended in those with low AHI, low-moderate BMI, and younger age. For severe sleep apnea, the hypoglossal nerve stimulator was recommended as well.  problem 6: obesity/out of shape -(improved)- Kannan (on Ozempic 1/2024) -recommend 100 g of Protein daily -Thriving on Ozempic -recommended Berberine OTC supplement for visceral fat loss  -recommended to read "Bright Spots and Land Mines" , Engine 2" and "Obesity Code" -Weight loss, exercise, and diet control were discussed and are highly encouraged. Treatment options were given such as, aqua therapy, and contacting a nutritionist. Recommended to use the elliptical, stationary bike, less use of treadmill. Obesity is associated with worsening asthma, shortness of breath, and potential for cardiac disease, diabetes, and other underlying medical conditions.  problem 7: poor breathing mechanics -Recommended Wim Hof and Buteyko breathing techniques -Proper breathing techniques were reviewed with an emphasis of exhalation. Patient instructed to breath in for 1 second and out for four seconds. Patient was encouraged to not talk while walking.  Problem 8: Health Maintenance/COVID19 Precautions -Complete Covid 19 antibody blood work -s/p Pfizer COVID 19 vaccine x 3 -Covid 19 vaccine discussed at length with patient; booster - Clean your hands often. Wash your hands often with soap and water for at least 20 seconds, especially after blowing your nose, coughing, or sneezing, or having been in a public place. - If soap and water are not available, use a hand  that contains at least 60% alcohol. - To the extent possible, avoid touching high-touch surfaces in public places - elevator buttons, door handles, handrails, handshaking with people, etc. Use a tissue or your sleeve to cover your hand or finger if you must touch something. - Wash your hands after touching surfaces in public places. - Avoid touching your face, nose, eyes, etc. - Clean and disinfect your home to remove germs: practice routine cleaning of frequently touched surfaces (for example: tables, doorknobs, light switches, handles, desks, toilets, faucets, sinks & cell phones) - Avoid crowds, especially in poorly ventilated spaces. Your risk of exposure to respiratory viruses like COVID-19 may increase in crowded, closed-in settings with little air circulation if there are people in the crowd who are sick. All patients are recommended to practice social distancing and stay at least 6 feet away from others. - Avoid all non-essential travel including plane trips, and especially avoid embarking on cruise ships. -If COVID-19 is spreading in your community, take extra measures to put distance between yourself and other people to further reduce your risk of being exposed to this new virus. -Stay home as much as possible. - Consider ways of getting food brought to your house through family, social, or commercial networks -Be aware that the virus has been known to live in the air up to 3 hours post exposure, cardboard up to 24 hours post exposure, copper up to 4 hours post exposure, steel and plastic up to 2-3 days post exposure. Risk of transmission from these surfaces are affected by many variables. COVID-19 precautionary Immune Support Recommendations: -OTC Vitamin C 500mg BID -OTC Quercetin 250-500mg BID -OTC Zinc 75-100mg per day -OTC Melatonin 1 or 2mg a night -OTC Vitamin D 1-4000mg per day -OTC Tonic Water 8oz per day -Water 0.5-1 gallon per day Asthma and COVID19: You need to make sure your asthma is under control. This often requires the use of inhaled corticosteroids (and sometimes oral corticosteroids). Inhaled corticosteroids do not likely reduce your immune system's ability to fight infections, but oral corticosteroids may. It is important to use the steps above to protect yourself to limit your exposure to any respiratory virus.  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 Risks, benefits were reviewed in detail. The guidelines for abnormalities include follow up CT scans at various intervals which could range from 6 weeks to 1 year intervals. If there is a change for the worse then consideration for a biopsy will be considered if the patient is a candidate. Second opinion evaluation with a thoracic surgeon or an interventional radiologist could be offered.   problem 10: health maintenance -received flu shot - 2023 -recommended strep pneumonia vaccines: Prevnar-13 vaccine, followed by Pneumo vaccine 23 one year following -recommended early intervention for URIs -recommended regular osteoporosis evaluations -recommended early dermatological evaluations -recommended after the age of 50 to the age of 70, colonoscopy every 5 years  Follow up in 6 months with full spirometry and NiOx testing He is encouraged to call with any changes, concerns, or questions.

## 2024-08-15 NOTE — HISTORY OF PRESENT ILLNESS
[FreeTextEntry1] : Mr. Villegas is a 53 year old male presenting to the office for a follow-up pulmonary evaluation for asthma, chronic cough, GERD, NADINE, postnasal drip, sleep-disordered breathing, snoring, low vitamin D and shortness of breath. His chief complaint is    he notes feeling well  He notes exercising. -he notes no being sick recently -he notes his weight is stable (222 lbs) he notes sleep is good -he notes GERD is controlled with Omeprazole  he notes vision is stable but now needs reading glasses  he notes that his appetite is good.. -he notes he now is on Ozempic he notes sleep is good with no snoring -he notes he wants to lose more weight-#1 issue   -Patient denies any headaches, nausea, vomiting, fever, chills, sweats, chest pain, chest pressure, palpitations, coughing, wheezing, fatigue, diarrhea, constipation, dysphagia, arthralgias, myalgias, dizziness, leg swelling, leg pain, itchy eyes, itchy ears, dysphonia, heartburn or sour taste in mouth.

## 2024-10-09 ENCOUNTER — RX RENEWAL (OUTPATIENT)
Age: 54
End: 2024-10-09

## 2024-11-05 ENCOUNTER — APPOINTMENT (OUTPATIENT)
Dept: BARIATRICS | Facility: CLINIC | Age: 54
End: 2024-11-05
Payer: MEDICAID

## 2024-11-05 VITALS
HEART RATE: 57 BPM | TEMPERATURE: 97.1 F | BODY MASS INDEX: 36.49 KG/M2 | OXYGEN SATURATION: 99 % | WEIGHT: 219 LBS | HEIGHT: 65 IN | SYSTOLIC BLOOD PRESSURE: 138 MMHG | DIASTOLIC BLOOD PRESSURE: 84 MMHG

## 2024-11-05 DIAGNOSIS — E66.9 OBESITY, UNSPECIFIED: ICD-10-CM

## 2024-11-05 DIAGNOSIS — Z98.84 BARIATRIC SURGERY STATUS: ICD-10-CM

## 2024-11-05 DIAGNOSIS — R63.4 ABNORMAL WEIGHT LOSS: ICD-10-CM

## 2024-11-05 PROCEDURE — 99215 OFFICE O/P EST HI 40 MIN: CPT

## 2024-12-03 ENCOUNTER — APPOINTMENT (OUTPATIENT)
Dept: CARDIOLOGY | Facility: CLINIC | Age: 54
End: 2024-12-03

## 2024-12-23 NOTE — H&P PST ADULT - HEIGHT IN FEET
[Lower back] : lower back [Dull/Aching] : dull/aching [Sharp] : sharp [Shooting] : shooting [Throbbing] : throbbing [Household chores] : household chores [Nothing helps with pain getting better] : Nothing helps with pain getting better [Standing] : standing [Walking] : walking [10] : 10 [Tingling] : tingling [Constant] : constant [Sleep] : sleep [Sitting] : sitting [Stairs] : stairs [Lying in bed] : lying in bed [FreeTextEntry1] : 12/23/2024: s/p L5-S1 LESI on 1/25/24 with >50% relief and improvement of ADLs. Pain worsening down the left leg and radiates to the foot described as a sharp shooting throbbing with associated numbness/tingling in the toes.   12/26/2023: s/p LEFT L4-5 L5-S1 TFESI on 11/30/23 with >50% relief and improvement of ADLs. Pain was great for 2 days and then started to return. Pain no longer radiating down the leg but still having pain radiating to the left buttock.   Initial HPI 11/09/2023: Pain started a few months ago and is on the LEFT side of the lower back and radiates into the left buttock and down the left posterior thigh and lower leg to the toes described as a sharp shooting pain with associated numbness and tingling. Saw Dr. Funez who recommended LESI.   MRI Lumbar Spine 10/21/23 independently reviewed: multilevel HNPs with nerve impingement.  Conservative Care:  has done PT with relief  Pain Medications: Advil PRN; gabapentin 100mg TID with no relief;  Past Injections: ESIs many years ago  Spine surgery: none  Blood thinners: none [] : no [FreeTextEntry6] : numbness, weakness [FreeTextEntry7] : lt leg  [de-identified] : l mri  [TWNoteComboBox1] : 50% 5

## 2024-12-31 ENCOUNTER — LABORATORY RESULT (OUTPATIENT)
Age: 54
End: 2024-12-31

## 2024-12-31 ENCOUNTER — APPOINTMENT (OUTPATIENT)
Dept: CARDIOLOGY | Facility: CLINIC | Age: 54
End: 2024-12-31
Payer: MEDICAID

## 2024-12-31 ENCOUNTER — NON-APPOINTMENT (OUTPATIENT)
Age: 54
End: 2024-12-31

## 2024-12-31 VITALS
WEIGHT: 210 LBS | SYSTOLIC BLOOD PRESSURE: 142 MMHG | BODY MASS INDEX: 34.99 KG/M2 | OXYGEN SATURATION: 98 % | HEART RATE: 59 BPM | DIASTOLIC BLOOD PRESSURE: 92 MMHG | TEMPERATURE: 98.6 F | HEIGHT: 65 IN

## 2024-12-31 DIAGNOSIS — I35.1 NONRHEUMATIC AORTIC (VALVE) INSUFFICIENCY: ICD-10-CM

## 2024-12-31 DIAGNOSIS — I07.1 RHEUMATIC TRICUSPID INSUFFICIENCY: ICD-10-CM

## 2024-12-31 DIAGNOSIS — I37.1 NONRHEUMATIC PULMONARY VALVE INSUFFICIENCY: ICD-10-CM

## 2024-12-31 DIAGNOSIS — I10 ESSENTIAL (PRIMARY) HYPERTENSION: ICD-10-CM

## 2024-12-31 PROCEDURE — 99214 OFFICE O/P EST MOD 30 MIN: CPT | Mod: 25

## 2024-12-31 PROCEDURE — 93000 ELECTROCARDIOGRAM COMPLETE: CPT

## 2024-12-31 RX ORDER — TELMISARTAN AND HYDROCHLOROTHIAZIDE 80; 12.5 MG/1; MG/1
80-12.5 TABLET ORAL
Qty: 90 | Refills: 1 | Status: ACTIVE | COMMUNITY
Start: 2024-12-31 | End: 1900-01-01

## 2025-01-02 ENCOUNTER — APPOINTMENT (OUTPATIENT)
Dept: ENDOCRINOLOGY | Facility: CLINIC | Age: 55
End: 2025-01-02
Payer: MEDICAID

## 2025-01-02 VITALS
BODY MASS INDEX: 35.49 KG/M2 | SYSTOLIC BLOOD PRESSURE: 100 MMHG | WEIGHT: 213 LBS | OXYGEN SATURATION: 98 % | DIASTOLIC BLOOD PRESSURE: 82 MMHG | HEART RATE: 61 BPM | HEIGHT: 65 IN

## 2025-01-02 DIAGNOSIS — E11.9 TYPE 2 DIABETES MELLITUS W/OUT COMPLICATIONS: ICD-10-CM

## 2025-01-02 DIAGNOSIS — E78.5 HYPERLIPIDEMIA, UNSPECIFIED: ICD-10-CM

## 2025-01-02 PROCEDURE — 99214 OFFICE O/P EST MOD 30 MIN: CPT

## 2025-02-04 ENCOUNTER — APPOINTMENT (OUTPATIENT)
Dept: BARIATRICS | Facility: CLINIC | Age: 55
End: 2025-02-04
Payer: MEDICAID

## 2025-02-04 VITALS
WEIGHT: 211.64 LBS | HEART RATE: 63 BPM | HEIGHT: 65 IN | BODY MASS INDEX: 35.26 KG/M2 | OXYGEN SATURATION: 96 % | SYSTOLIC BLOOD PRESSURE: 130 MMHG | TEMPERATURE: 97.8 F | DIASTOLIC BLOOD PRESSURE: 84 MMHG

## 2025-02-04 DIAGNOSIS — R63.5 ABNORMAL WEIGHT GAIN: ICD-10-CM

## 2025-02-04 DIAGNOSIS — Z00.00 ENCOUNTER FOR GENERAL ADULT MEDICAL EXAMINATION W/OUT ABNORMAL FINDINGS: ICD-10-CM

## 2025-02-04 DIAGNOSIS — E61.8 DEFICIENCY OF OTHER SPECIFIED NUTRIENT ELEMENTS: ICD-10-CM

## 2025-02-04 DIAGNOSIS — E56.9 VITAMIN DEFICIENCY, UNSPECIFIED: ICD-10-CM

## 2025-02-04 DIAGNOSIS — E46 UNSPECIFIED PROTEIN-CALORIE MALNUTRITION: ICD-10-CM

## 2025-02-04 DIAGNOSIS — E11.9 TYPE 2 DIABETES MELLITUS W/OUT COMPLICATIONS: ICD-10-CM

## 2025-02-04 DIAGNOSIS — K91.2 POSTSURGICAL MALABSORPTION, NOT ELSEWHERE CLASSIFIED: ICD-10-CM

## 2025-02-04 DIAGNOSIS — Z90.3 POSTSURGICAL MALABSORPTION, NOT ELSEWHERE CLASSIFIED: ICD-10-CM

## 2025-02-04 DIAGNOSIS — Z98.890 OTHER SPECIFIED POSTPROCEDURAL STATES: ICD-10-CM

## 2025-02-04 DIAGNOSIS — R79.9 ABNORMAL FINDING OF BLOOD CHEMISTRY, UNSPECIFIED: ICD-10-CM

## 2025-02-04 DIAGNOSIS — Z79.899 OTHER LONG TERM (CURRENT) DRUG THERAPY: ICD-10-CM

## 2025-02-04 DIAGNOSIS — Z01.812 ENCOUNTER FOR PREPROCEDURAL LABORATORY EXAMINATION: ICD-10-CM

## 2025-02-04 DIAGNOSIS — E78.00 PURE HYPERCHOLESTEROLEMIA, UNSPECIFIED: ICD-10-CM

## 2025-02-04 DIAGNOSIS — R63.8 OTHER SYMPTOMS AND SIGNS CONCERNING FOOD AND FLUID INTAKE: ICD-10-CM

## 2025-02-04 DIAGNOSIS — R63.2 POLYPHAGIA: ICD-10-CM

## 2025-02-04 DIAGNOSIS — Z98.84 BARIATRIC SURGERY STATUS: ICD-10-CM

## 2025-02-04 PROCEDURE — 99215 OFFICE O/P EST HI 40 MIN: CPT

## 2025-02-06 ENCOUNTER — APPOINTMENT (OUTPATIENT)
Dept: PULMONOLOGY | Facility: CLINIC | Age: 55
End: 2025-02-06
Payer: MEDICAID

## 2025-02-06 ENCOUNTER — NON-APPOINTMENT (OUTPATIENT)
Age: 55
End: 2025-02-06

## 2025-02-06 VITALS
RESPIRATION RATE: 16 BRPM | HEART RATE: 71 BPM | OXYGEN SATURATION: 98 % | DIASTOLIC BLOOD PRESSURE: 76 MMHG | WEIGHT: 211 LBS | TEMPERATURE: 97.6 F | HEIGHT: 65 IN | SYSTOLIC BLOOD PRESSURE: 120 MMHG | BODY MASS INDEX: 35.16 KG/M2

## 2025-02-06 DIAGNOSIS — K21.9 GASTRO-ESOPHAGEAL REFLUX DISEASE W/OUT ESOPHAGITIS: ICD-10-CM

## 2025-02-06 DIAGNOSIS — J98.4 OTHER DISORDERS OF LUNG: ICD-10-CM

## 2025-02-06 DIAGNOSIS — G47.33 OBSTRUCTIVE SLEEP APNEA (ADULT) (PEDIATRIC): ICD-10-CM

## 2025-02-06 DIAGNOSIS — R91.8 OTHER NONSPECIFIC ABNORMAL FINDING OF LUNG FIELD: ICD-10-CM

## 2025-02-06 DIAGNOSIS — E66.9 OBESITY, UNSPECIFIED: ICD-10-CM

## 2025-02-06 DIAGNOSIS — J45.909 UNSPECIFIED ASTHMA, UNCOMPLICATED: ICD-10-CM

## 2025-02-06 DIAGNOSIS — R06.02 SHORTNESS OF BREATH: ICD-10-CM

## 2025-02-06 PROCEDURE — 94010 BREATHING CAPACITY TEST: CPT

## 2025-02-06 PROCEDURE — 94729 DIFFUSING CAPACITY: CPT

## 2025-02-06 PROCEDURE — 94727 GAS DIL/WSHOT DETER LNG VOL: CPT

## 2025-02-06 PROCEDURE — ZZZZZ: CPT

## 2025-02-06 PROCEDURE — 99214 OFFICE O/P EST MOD 30 MIN: CPT | Mod: 25

## 2025-02-06 PROCEDURE — 95012 NITRIC OXIDE EXP GAS DETER: CPT

## 2025-02-13 ENCOUNTER — EMERGENCY (EMERGENCY)
Facility: HOSPITAL | Age: 55
LOS: 0 days | Discharge: HOME | End: 2025-02-13
Attending: STUDENT IN AN ORGANIZED HEALTH CARE EDUCATION/TRAINING PROGRAM
Payer: MEDICAID

## 2025-02-13 VITALS — WEIGHT: 210.1 LBS | HEIGHT: 65 IN

## 2025-02-13 VITALS
DIASTOLIC BLOOD PRESSURE: 89 MMHG | HEART RATE: 68 BPM | OXYGEN SATURATION: 100 % | RESPIRATION RATE: 16 BRPM | SYSTOLIC BLOOD PRESSURE: 160 MMHG

## 2025-02-13 DIAGNOSIS — R06.02 SHORTNESS OF BREATH: ICD-10-CM

## 2025-02-13 DIAGNOSIS — Z98.84 BARIATRIC SURGERY STATUS: ICD-10-CM

## 2025-02-13 DIAGNOSIS — E78.5 HYPERLIPIDEMIA, UNSPECIFIED: ICD-10-CM

## 2025-02-13 DIAGNOSIS — J45.909 UNSPECIFIED ASTHMA, UNCOMPLICATED: ICD-10-CM

## 2025-02-13 DIAGNOSIS — G47.30 SLEEP APNEA, UNSPECIFIED: ICD-10-CM

## 2025-02-13 DIAGNOSIS — E11.9 TYPE 2 DIABETES MELLITUS WITHOUT COMPLICATIONS: ICD-10-CM

## 2025-02-13 DIAGNOSIS — R07.0 PAIN IN THROAT: ICD-10-CM

## 2025-02-13 DIAGNOSIS — Z98.890 OTHER SPECIFIED POSTPROCEDURAL STATES: Chronic | ICD-10-CM

## 2025-02-13 DIAGNOSIS — R09.A2 FOREIGN BODY SENSATION, THROAT: ICD-10-CM

## 2025-02-13 DIAGNOSIS — I10 ESSENTIAL (PRIMARY) HYPERTENSION: ICD-10-CM

## 2025-02-13 DIAGNOSIS — Z96.652 PRESENCE OF LEFT ARTIFICIAL KNEE JOINT: Chronic | ICD-10-CM

## 2025-02-13 PROCEDURE — 70490 CT SOFT TISSUE NECK W/O DYE: CPT | Mod: 26

## 2025-02-13 PROCEDURE — 70490 CT SOFT TISSUE NECK W/O DYE: CPT | Mod: MC

## 2025-02-13 PROCEDURE — 99284 EMERGENCY DEPT VISIT MOD MDM: CPT

## 2025-02-13 PROCEDURE — 99284 EMERGENCY DEPT VISIT MOD MDM: CPT | Mod: 25

## 2025-02-13 RX ORDER — ACETAMINOPHEN 160 MG/5ML
975 SUSPENSION ORAL ONCE
Refills: 0 | Status: DISCONTINUED | OUTPATIENT
Start: 2025-02-13 | End: 2025-02-13

## 2025-02-13 NOTE — ED STATDOCS - NSFOLLOWUPINSTRUCTIONS_ED_ALL_ED_FT
Follow up with your pmd within 48 hours - show copies of ED results.   Return to the ED if any worsening symptoms.

## 2025-02-13 NOTE — ED STATDOCS - PROGRESS NOTE DETAILS
GILL Olson: ct showing no FB. pt tolerating PO and in no respiratory distress. Pt stable for dc and to follow up with pmd. Pt agrees with plan. GILL Olson: I participated in the care of this patient. I agree with the history, physical and plan.

## 2025-02-13 NOTE — ED ADULT NURSE REASSESSMENT NOTE - NS ED NURSE REASSESS COMMENT FT1
Patient was treated and evaluated by intake provider. Please see provider's notes for assessment. Writing RN saw patient for discharge only. Pt reporting tolerating PO intake of crackers and water. Pt verbalized understanding of discharge instructions. Pt ambulatory with a steady gait upon leaving ED. Pt reports he will take BP meds when he gets home.

## 2025-02-13 NOTE — ED STATDOCS - NSICDXPASTMEDICALHX_GEN_ALL_CORE_FT
PAST MEDICAL HISTORY:  Asthma     Carpal tunnel syndrome, left     Dyslipidemia     Essential hypertension     Morbid obesity     Sleep apnea     Type 2 diabetes mellitus

## 2025-02-13 NOTE — ED STATDOCS - PATIENT PORTAL LINK FT
You can access the FollowMyHealth Patient Portal offered by [facility] by registering at the following website: [email or web address]. By joining [employer]’s FollowMyHealth portal, you will also be able to view your health information using other applications (apps) compatible with our system.

## 2025-02-13 NOTE — ED STATDOCS - NSICDXPASTSURGICALHX_GEN_ALL_CORE_FT
PAST SURGICAL HISTORY:  H/O total knee replacement, left 2016    History of elbow surgery right, 2018

## 2025-02-13 NOTE — ED STATDOCS - CLINICAL SUMMARY MEDICAL DECISION MAKING FREE TEXT BOX
53 y/o M with PMHx of asthma, dyslipidemia, DM2, HTN, gadtric bypass with Dr Mireles at [facility], sleep apnea presents to the ED c/o foreign body in his throat. States he was eating steak and a piece got stuck in his throat. Tried to drink water to push it down, but then started to have excess phlegm and difficulty breathing. States he drank more water and got part of the steak down, but still feels some of it stuck in his throat. Pt currently not having difficulty breathing. No pain meds taken PTA.     On exam: no stridor.     Plan: CT neck r/o foreign body, Tylenol for pain, PO challenge. 53 y/o M with PMHx of asthma, dyslipidemia, DM2, HTN, gadtric bypass with Dr Mireles at [facility], sleep apnea presents to the ED c/o foreign body in his throat. States he was eating steak and a piece got stuck in his throat. Tried to drink water to push it down, but then started to have excess phlegm and difficulty breathing. States he drank more water and got part of the steak down, but still feels some of it stuck in his throat. Pt currently not having difficulty breathing. No pain meds taken PTA.     On exam: no stridor.     Plan: CT neck r/o foreign body, reassess. 55 y/o M with PMHx of asthma, dyslipidemia, DM2, HTN, gastric bypass with Dr Mireles at [facility], sleep apnea presents to the ED c/o foreign body in his throat. States he was eating steak and a piece got stuck in his throat. Tried to drink water to push it down, but then started to have excess phlegm and difficulty breathing. States he drank more water and got part of the steak down, but still feels some of it stuck in his throat. Pt currently not having difficulty breathing. No pain meds taken PTA.     On exam: no resp distress/stridor, tolerating secretions, op clr, no FB identified    Plan: CT neck r/o foreign body, reassess.

## 2025-02-13 NOTE — ED ADULT TRIAGE NOTE - CHIEF COMPLAINT QUOTE
steak stuck in throat. pt speaking in full sentences. carlosak stuck in throat 20 mins pta. pt speaking in full sentences.

## 2025-02-13 NOTE — ED STATDOCS - PHYSICAL EXAMINATION
Constitutional: NAD, alert, verbal  HEENT: NCAT, EOMi, PERRL  Cardiac: RRR no MRG  Resp: clear, no wheezing or crackles, no stridor  GI: ab soft ntnd, no r/g  MSK/Ext: no edema  Neuro: HASSAN  Skin: No rashes Constitutional: NAD, alert, verbal  HEENT: OP clear, no FB identified, tolerating secretions  Cardiac: RRR no MRG  Resp: clear, no wheezing or crackles, no stridor  Neuro: SONYA

## 2025-03-26 ENCOUNTER — RX RENEWAL (OUTPATIENT)
Age: 55
End: 2025-03-26

## 2025-03-26 RX ORDER — SEMAGLUTIDE 1.34 MG/ML
4 INJECTION, SOLUTION SUBCUTANEOUS
Qty: 9 | Refills: 2 | Status: ACTIVE | COMMUNITY
Start: 2025-03-26 | End: 1900-01-01

## 2025-04-24 ENCOUNTER — APPOINTMENT (OUTPATIENT)
Dept: BARIATRICS | Facility: CLINIC | Age: 55
End: 2025-04-24
Payer: MEDICAID

## 2025-04-24 VITALS
OXYGEN SATURATION: 99 % | TEMPERATURE: 97.2 F | HEART RATE: 60 BPM | DIASTOLIC BLOOD PRESSURE: 78 MMHG | WEIGHT: 208 LBS | BODY MASS INDEX: 34.66 KG/M2 | SYSTOLIC BLOOD PRESSURE: 128 MMHG | HEIGHT: 65 IN

## 2025-04-24 DIAGNOSIS — Z98.84 BARIATRIC SURGERY STATUS: ICD-10-CM

## 2025-04-24 DIAGNOSIS — G47.33 OBSTRUCTIVE SLEEP APNEA (ADULT) (PEDIATRIC): ICD-10-CM

## 2025-04-24 DIAGNOSIS — I10 ESSENTIAL (PRIMARY) HYPERTENSION: ICD-10-CM

## 2025-04-24 DIAGNOSIS — K21.9 GASTRO-ESOPHAGEAL REFLUX DISEASE W/OUT ESOPHAGITIS: ICD-10-CM

## 2025-04-24 DIAGNOSIS — E11.9 TYPE 2 DIABETES MELLITUS W/OUT COMPLICATIONS: ICD-10-CM

## 2025-04-24 PROCEDURE — 99214 OFFICE O/P EST MOD 30 MIN: CPT

## 2025-04-30 ENCOUNTER — LABORATORY RESULT (OUTPATIENT)
Age: 55
End: 2025-04-30

## 2025-05-06 ENCOUNTER — RX RENEWAL (OUTPATIENT)
Age: 55
End: 2025-05-06

## 2025-05-06 RX ORDER — ROSUVASTATIN CALCIUM 20 MG/1
20 TABLET, FILM COATED ORAL
Qty: 90 | Refills: 1 | Status: ACTIVE | COMMUNITY
Start: 2025-05-05 | End: 1900-01-01

## 2025-05-06 NOTE — HISTORY OF PRESENT ILLNESS
Department of Anesthesiology  Preprocedure Note       Name:  Ellie Santacruz   Age:  53 y.o.  :  1972                                          MRN:  [id number]         Date:  2025      Surgeon: Surgeon(s):  Hugo Rodríguez DO    Procedure: Procedure(s):  ESOPHAGOGASTRODUODENOSCOPY    Medications prior to admission:   Prior to Admission medications    Medication Sig Start Date End Date Taking? Authorizing Provider   insulin glargine (LANTUS) 100 UNIT/ML injection vial Inject 38 Units into the skin every morning 25  Yes Isaak Monk APRN - CNP   lactulose (CHRONULAC) 10 GM/15ML solution Take 30 mLs by mouth 3 times daily 25  Yes Isaak Monk APRN - CNP   insulin lispro (HUMALOG,ADMELOG) 100 UNIT/ML SOLN injection vial Inject 0-8 Units into the skin 3 times daily (with meals) 24  Yes Liza Oh DO   calcium carbonate (OSCAL) 500 MG TABS tablet Take 1 tablet by mouth 2 times daily   Yes Irma Dudley MD   gabapentin (NEURONTIN) 100 MG capsule Take 2 capsules by mouth 3 times daily.   Yes Irma Dudley MD   oxyCODONE (ROXICODONE) 5 MG immediate release tablet Take 1 tablet by mouth every 4 hours as needed for Pain.   Yes Irma Dudley MD   anastrozole (ARIMIDEX) 1 MG tablet Take 1 tablet by mouth every morning 24  Yes Isaak Rojas MD   Ascorbic Acid (VITAMIN C) 1000 MG tablet Take 1 tablet by mouth every morning   Yes Irma Dudley MD   furosemide (LASIX) 20 MG tablet Take 2 tablets by mouth daily as needed (SWELLING)   Yes Iram Dudley MD   hydrOXYzine pamoate (VISTARIL) 25 MG capsule Take 1 capsule by mouth 4 times daily as needed for Anxiety   Yes Irma Dudley MD   omeprazole (PRILOSEC) 20 MG delayed release capsule Take 1 capsule by mouth every morning   Yes Irma Dudley MD   VORTIoxetine HBr (TRINTELLIX) 20 MG TABS tablet Take 0.5 tablets by mouth nightly   Yes Irma Dudley MD   tiotropium (SPIRIVA RESPIMAT) 1.25  [FreeTextEntry1] : This is a pleasant 47 yo M who presents to the office for an initial consultation at the request of Dr. Mireles (Bariatric Surgery) regarding excess heavy skin of the abdomen.\par Pt is s/p laparoscopic gastric sleeve surgery on 07/31/18. His weight prior to the surgery was 283lbs; he currently weighs 210lbs and would like to reach his goal weight of 180 lbs. Pt currently continues his diet and exercise regimen to maintain his weight. \par Following the drastic weight loss, pt experienced loose hanging skin at the lower abdomen that is heavy and causes daily discomfort. Pt reports itching and rashes underneath the skin fold that worsen in the warmer months; treats with over the counter creams and washcloths. Pt denies abx treatment. Pt also cannot fit into certain clothing due to the excess skin. He was referred to plastic surgery for treatment options. Otherwise no other complaints or concerns; denies fever, sweats, or chills.\par

## 2025-06-24 ENCOUNTER — RX RENEWAL (OUTPATIENT)
Age: 55
End: 2025-06-24

## 2025-07-02 ENCOUNTER — APPOINTMENT (OUTPATIENT)
Dept: ENDOCRINOLOGY | Facility: CLINIC | Age: 55
End: 2025-07-02
Payer: MEDICAID

## 2025-07-02 VITALS
BODY MASS INDEX: 33.99 KG/M2 | WEIGHT: 204 LBS | HEIGHT: 65 IN | HEART RATE: 61 BPM | DIASTOLIC BLOOD PRESSURE: 72 MMHG | SYSTOLIC BLOOD PRESSURE: 120 MMHG | OXYGEN SATURATION: 97 %

## 2025-07-02 PROCEDURE — G2211 COMPLEX E/M VISIT ADD ON: CPT | Mod: NC

## 2025-07-02 PROCEDURE — 99214 OFFICE O/P EST MOD 30 MIN: CPT

## 2025-07-09 ENCOUNTER — LABORATORY RESULT (OUTPATIENT)
Age: 55
End: 2025-07-09

## 2025-07-09 ENCOUNTER — APPOINTMENT (OUTPATIENT)
Dept: CARDIOLOGY | Facility: CLINIC | Age: 55
End: 2025-07-09
Payer: MEDICAID

## 2025-07-09 VITALS
WEIGHT: 200 LBS | RESPIRATION RATE: 16 BRPM | TEMPERATURE: 97.9 F | HEIGHT: 65 IN | SYSTOLIC BLOOD PRESSURE: 125 MMHG | BODY MASS INDEX: 33.32 KG/M2 | OXYGEN SATURATION: 99 % | DIASTOLIC BLOOD PRESSURE: 76 MMHG | HEART RATE: 57 BPM

## 2025-07-09 PROCEDURE — 93000 ELECTROCARDIOGRAM COMPLETE: CPT

## 2025-07-09 PROCEDURE — 99213 OFFICE O/P EST LOW 20 MIN: CPT | Mod: 25

## 2025-07-14 RX ORDER — EZETIMIBE 10 MG/1
10 TABLET ORAL DAILY
Qty: 90 | Refills: 1 | Status: ACTIVE | COMMUNITY
Start: 2025-07-14 | End: 1900-01-01

## 2025-07-28 ENCOUNTER — RX RENEWAL (OUTPATIENT)
Age: 55
End: 2025-07-28

## 2025-08-04 ENCOUNTER — RX RENEWAL (OUTPATIENT)
Age: 55
End: 2025-08-04

## 2025-08-14 ENCOUNTER — APPOINTMENT (OUTPATIENT)
Dept: PULMONOLOGY | Facility: CLINIC | Age: 55
End: 2025-08-14

## 2025-08-29 ENCOUNTER — APPOINTMENT (OUTPATIENT)
Dept: PULMONOLOGY | Facility: CLINIC | Age: 55
End: 2025-08-29
Payer: MEDICAID

## 2025-08-29 ENCOUNTER — APPOINTMENT (OUTPATIENT)
Dept: ORTHOPEDIC SURGERY | Facility: CLINIC | Age: 55
End: 2025-08-29

## 2025-08-29 VITALS
HEIGHT: 65 IN | BODY MASS INDEX: 33.99 KG/M2 | OXYGEN SATURATION: 98 % | SYSTOLIC BLOOD PRESSURE: 120 MMHG | WEIGHT: 204 LBS | DIASTOLIC BLOOD PRESSURE: 74 MMHG | HEART RATE: 57 BPM | RESPIRATION RATE: 16 BRPM | TEMPERATURE: 96.3 F

## 2025-08-29 VITALS — HEIGHT: 65 IN | WEIGHT: 204 LBS | BODY MASS INDEX: 33.99 KG/M2

## 2025-08-29 DIAGNOSIS — M17.11 UNILATERAL PRIMARY OSTEOARTHRITIS, RIGHT KNEE: ICD-10-CM

## 2025-08-29 DIAGNOSIS — R09.82 POSTNASAL DRIP: ICD-10-CM

## 2025-08-29 DIAGNOSIS — J45.909 UNSPECIFIED ASTHMA, UNCOMPLICATED: ICD-10-CM

## 2025-08-29 DIAGNOSIS — E66.9 OBESITY, UNSPECIFIED: ICD-10-CM

## 2025-08-29 DIAGNOSIS — R91.8 OTHER NONSPECIFIC ABNORMAL FINDING OF LUNG FIELD: ICD-10-CM

## 2025-08-29 PROCEDURE — 94010 BREATHING CAPACITY TEST: CPT

## 2025-08-29 PROCEDURE — 99214 OFFICE O/P EST MOD 30 MIN: CPT | Mod: 25

## 2025-08-29 PROCEDURE — 73560 X-RAY EXAM OF KNEE 1 OR 2: CPT | Mod: 26,RT

## 2025-08-29 PROCEDURE — 20610 DRAIN/INJ JOINT/BURSA W/O US: CPT | Mod: RT

## 2025-08-29 PROCEDURE — 95012 NITRIC OXIDE EXP GAS DETER: CPT
